# Patient Record
Sex: MALE | Race: WHITE | NOT HISPANIC OR LATINO | Employment: OTHER | URBAN - METROPOLITAN AREA
[De-identification: names, ages, dates, MRNs, and addresses within clinical notes are randomized per-mention and may not be internally consistent; named-entity substitution may affect disease eponyms.]

---

## 2017-03-01 ENCOUNTER — NURSE TRIAGE (OUTPATIENT)
Dept: ADMINISTRATIVE | Facility: CLINIC | Age: 72
End: 2017-03-01

## 2017-03-01 ENCOUNTER — TELEPHONE (OUTPATIENT)
Dept: FAMILY MEDICINE | Facility: CLINIC | Age: 72
End: 2017-03-01

## 2017-03-01 NOTE — TELEPHONE ENCOUNTER
Reason for Disposition   Taking a medicine that could cause dizziness (e.g., blood pressure medications, diuretics)    Protocols used: ST DIZZINESS-A-OH    Leon called to request an appointment due to dizziness.  Transferred to triage per scheduling.  Evan reports dizziness at times when goes from sitting to standing.  Is on BP medication.  States BP has been ok. Evan reports that he thinks he has an inner ear problem and tried to get an ENT appointment but says there is none on the [facility].  As Evan is on blood pressure medication protocol states discuss with PCP and callback by Nurse today.  Please contact Evan to advise at [phone].

## 2017-03-01 NOTE — TELEPHONE ENCOUNTER
----- Message from Marycarmen Jalloh sent at 3/1/2017 11:20 AM CST -----  Contact: pt  Pt is experiencing Dizziness while standing  Call placed to pod, no answer i did reach nurseoncall Colby)  thanks

## 2017-03-07 ENCOUNTER — OFFICE VISIT (OUTPATIENT)
Dept: FAMILY MEDICINE | Facility: CLINIC | Age: 72
End: 2017-03-07
Payer: MEDICARE

## 2017-03-07 VITALS
BODY MASS INDEX: 28.35 KG/M2 | HEIGHT: 69 IN | HEART RATE: 57 BPM | SYSTOLIC BLOOD PRESSURE: 140 MMHG | WEIGHT: 191.38 LBS | DIASTOLIC BLOOD PRESSURE: 80 MMHG | TEMPERATURE: 98 F

## 2017-03-07 DIAGNOSIS — Z00.00 PREVENTATIVE HEALTH CARE: ICD-10-CM

## 2017-03-07 DIAGNOSIS — R42 VERTIGO: Primary | ICD-10-CM

## 2017-03-07 PROCEDURE — 90471 IMMUNIZATION ADMIN: CPT | Mod: S$GLB,,, | Performed by: FAMILY MEDICINE

## 2017-03-07 PROCEDURE — 99999 PR PBB SHADOW E&M-EST. PATIENT-LVL III: CPT | Mod: PBBFAC,,, | Performed by: FAMILY MEDICINE

## 2017-03-07 PROCEDURE — 3077F SYST BP >= 140 MM HG: CPT | Mod: S$GLB,,, | Performed by: FAMILY MEDICINE

## 2017-03-07 PROCEDURE — 99213 OFFICE O/P EST LOW 20 MIN: CPT | Mod: 25,S$GLB,, | Performed by: FAMILY MEDICINE

## 2017-03-07 PROCEDURE — G0009 ADMIN PNEUMOCOCCAL VACCINE: HCPCS | Mod: 59,S$GLB,, | Performed by: FAMILY MEDICINE

## 2017-03-07 PROCEDURE — 1160F RVW MEDS BY RX/DR IN RCRD: CPT | Mod: S$GLB,,, | Performed by: FAMILY MEDICINE

## 2017-03-07 PROCEDURE — 90715 TDAP VACCINE 7 YRS/> IM: CPT | Mod: S$GLB,,, | Performed by: FAMILY MEDICINE

## 2017-03-07 PROCEDURE — 1159F MED LIST DOCD IN RCRD: CPT | Mod: S$GLB,,, | Performed by: FAMILY MEDICINE

## 2017-03-07 PROCEDURE — 1157F ADVNC CARE PLAN IN RCRD: CPT | Mod: S$GLB,,, | Performed by: FAMILY MEDICINE

## 2017-03-07 PROCEDURE — 3079F DIAST BP 80-89 MM HG: CPT | Mod: S$GLB,,, | Performed by: FAMILY MEDICINE

## 2017-03-07 PROCEDURE — 1126F AMNT PAIN NOTED NONE PRSNT: CPT | Mod: S$GLB,,, | Performed by: FAMILY MEDICINE

## 2017-03-07 PROCEDURE — 90732 PPSV23 VACC 2 YRS+ SUBQ/IM: CPT | Mod: S$GLB,,, | Performed by: FAMILY MEDICINE

## 2017-03-07 RX ORDER — CIPROFLOXACIN 500 MG/1
500 TABLET ORAL 2 TIMES DAILY
Refills: 0 | COMMUNITY
Start: 2016-12-09 | End: 2017-03-07 | Stop reason: ALTCHOICE

## 2017-03-07 RX ORDER — TRIAMTERENE AND HYDROCHLOROTHIAZIDE 37.5; 25 MG/1; MG/1
1 CAPSULE ORAL
COMMUNITY
End: 2018-05-28

## 2017-03-07 RX ORDER — HYDROCODONE BITARTRATE AND ACETAMINOPHEN 7.5; 325 MG/1; MG/1
1 TABLET ORAL EVERY 8 HOURS PRN
Refills: 0 | COMMUNITY
Start: 2016-12-09 | End: 2017-05-23

## 2017-03-07 NOTE — PROGRESS NOTES
Subjective:       Patient ID: Evan Garcia is a 71 y.o. male.    Chief Complaint: Dizziness (a week with dizziness,room spinnig,feels like he's falling,if he moves head really quickly he gets dizzy.Hm due pneumo,tetanus)    HPI Comments: C/o 1 week h/o being offbalance .  It comes and goes throughout the day and is worse in certain positions.  Worse in AM.  Tinnitus also seems worse lately.  Some associated nausea        Past Medical History:   Diagnosis Date    Ankylosing spondylitis     thoracic;     BPH (benign prostatic hypertrophy)     Cholelithiases     DDD (degenerative disc disease), lumbar     treated with FILOMENA with success    Fatty liver     HTN (hypertension)     Hypertriglyceridemia     Left kidney mass     followed by urology    Tinnitus of both ears     he consulted with ENT; He uses Xanax PRn    Wears glasses        Past Surgical History:   Procedure Laterality Date    KNEE SURGERY      right; 3 separate surgeries to repair patellar fracture    stapendectomy Bilateral 1985    bilateraly    WISDOM TOOTH EXTRACTION         Review of patient's allergies indicates:   Allergen Reactions    Sulfa (sulfonamide antibiotics) Hives and Other (See Comments)     Decreased white blood count per pt       Social History     Social History    Marital status:      Spouse name: N/A    Number of children: 3    Years of education: N/A     Occupational History    retired refinery      Social History Main Topics    Smoking status: Former Smoker     Types: Cigars, Cigarettes     Start date: 4/24/2014    Smokeless tobacco: Never Used      Comment: will smoke a cigar occasionally    Alcohol use Yes      Comment: occasional    Drug use: No    Sexual activity: Not on file     Other Topics Concern    Not on file     Social History Narrative    Hobbies: fish, hunt        Exercise: [employer] 3 times a week with treadmill               Current Outpatient Prescriptions on File Prior to Visit  "  Medication Sig Dispense Refill    alprazolam (XANAX) 0.5 MG tablet Take 1 tablet (0.5 mg total) by mouth nightly as needed (tinitus). 30 tablet 5    amlodipine (NORVASC) 10 MG tablet Take 1 tablet (10 mg total) by mouth once daily. 90 tablet 3    atenolol (TENORMIN) 100 MG tablet Take 1 tablet (100 mg total) by mouth once daily. 90 tablet 3    finasteride (PROSCAR) 5 mg tablet Take 1 tablet (5 mg total) by mouth once daily. 30 tablet 11     No current facility-administered medications on file prior to visit.        Family History   Problem Relation Age of Onset    Aneurysm Father     Cancer Mother      stomach    Diabetes Mother     Cancer Brother      salivary       Review of Systems   Constitutional: Negative for appetite change, chills, fever and unexpected weight change.   HENT: Negative for sore throat and trouble swallowing.    Eyes: Negative for pain and visual disturbance.   Respiratory: Negative for cough, chest tightness, shortness of breath and wheezing.    Cardiovascular: Negative for chest pain, palpitations and leg swelling.   Gastrointestinal: Negative for abdominal pain, blood in stool, constipation, diarrhea and nausea.   Genitourinary: Negative for difficulty urinating, dysuria and hematuria.   Musculoskeletal: Negative for arthralgias, gait problem and neck pain.   Skin: Negative for rash and wound.   Neurological: Positive for dizziness. Negative for weakness, light-headedness and headaches.   Hematological: Negative for adenopathy.   Psychiatric/Behavioral: Negative for dysphoric mood.       Objective:      BP (!) 140/80  Pulse (!) 57  Temp 98.2 °F (36.8 °C) (Oral)   Ht 5' 9" (1.753 m)  Wt 86.8 kg (191 lb 5.8 oz)  BMI 28.26 kg/m2  Physical Exam   Constitutional: He is oriented to person, place, and time. He appears well-developed and well-nourished. He is active. No distress.   HENT:   Head: Normocephalic and atraumatic.   Right Ear: External ear normal. A foreign body (cerumen " impaction) is present.   Left Ear: External ear normal.   Mouth/Throat: Uvula is midline, oropharynx is clear and moist and mucous membranes are normal. No oropharyngeal exudate.   Eyes: Conjunctivae, EOM and lids are normal. Pupils are equal, round, and reactive to light.   Neck: Normal range of motion, full passive range of motion without pain and phonation normal. Neck supple. No thyroid mass and no thyromegaly present.   Cardiovascular: Normal rate, regular rhythm, normal heart sounds and intact distal pulses.  Exam reveals no gallop and no friction rub.    No murmur heard.  Pulmonary/Chest: Effort normal and breath sounds normal. No respiratory distress. He has no wheezes. He has no rales.   Musculoskeletal: Normal range of motion.   Lymphadenopathy:     He has no cervical adenopathy.   Neurological: He is alert and oriented to person, place, and time. No cranial nerve deficit. Coordination normal.   Vertigo reproduced with left head turning and leaning forward   Skin: Skin is warm and dry.   Psychiatric: He has a normal mood and affect. His speech is normal and behavior is normal. Judgment and thought content normal.       Assessment:       1. Vertigo    2. Preventative health care        Plan:       Vertigo  -     Ambulatory referral to ENT    Preventative health care  -     Pneumococcal Polysaccharide Vaccine (23 Valent) (SQ/IM)  -     Tdap Vaccine      manually removed cerumen from right ear  ENT referral for vertigo and progressive tinnitus  Continue present meds  Counseled on regular exercise, maintenance of a healthy weight, balanced diet rich in fruits/vegetables and lean protein, and avoidance of unhealthy habits like smoking and excessive alcohol intake.

## 2017-05-22 DIAGNOSIS — N40.0 BENIGN NON-NODULAR PROSTATIC HYPERPLASIA WITHOUT LOWER URINARY TRACT SYMPTOMS: ICD-10-CM

## 2017-05-22 RX ORDER — FINASTERIDE 5 MG/1
5 TABLET, FILM COATED ORAL DAILY
Qty: 30 TABLET | Refills: 10 | Status: SHIPPED | OUTPATIENT
Start: 2017-05-22 | End: 2018-05-09 | Stop reason: SDUPTHER

## 2017-05-23 ENCOUNTER — OFFICE VISIT (OUTPATIENT)
Dept: FAMILY MEDICINE | Facility: CLINIC | Age: 72
End: 2017-05-23
Payer: MEDICARE

## 2017-05-23 VITALS
SYSTOLIC BLOOD PRESSURE: 146 MMHG | HEIGHT: 69 IN | DIASTOLIC BLOOD PRESSURE: 60 MMHG | TEMPERATURE: 98 F | WEIGHT: 200.63 LBS | BODY MASS INDEX: 29.71 KG/M2 | HEART RATE: 60 BPM

## 2017-05-23 DIAGNOSIS — H93.13 TINNITUS OF BOTH EARS: ICD-10-CM

## 2017-05-23 DIAGNOSIS — I10 ESSENTIAL HYPERTENSION: Primary | ICD-10-CM

## 2017-05-23 PROCEDURE — 99999 PR PBB SHADOW E&M-EST. PATIENT-LVL III: CPT | Mod: PBBFAC,,, | Performed by: FAMILY MEDICINE

## 2017-05-23 PROCEDURE — 3077F SYST BP >= 140 MM HG: CPT | Mod: S$GLB,,, | Performed by: FAMILY MEDICINE

## 2017-05-23 PROCEDURE — 3078F DIAST BP <80 MM HG: CPT | Mod: S$GLB,,, | Performed by: FAMILY MEDICINE

## 2017-05-23 PROCEDURE — 1125F AMNT PAIN NOTED PAIN PRSNT: CPT | Mod: S$GLB,,, | Performed by: FAMILY MEDICINE

## 2017-05-23 PROCEDURE — 1160F RVW MEDS BY RX/DR IN RCRD: CPT | Mod: S$GLB,,, | Performed by: FAMILY MEDICINE

## 2017-05-23 PROCEDURE — 1157F ADVNC CARE PLAN IN RCRD: CPT | Mod: S$GLB,,, | Performed by: FAMILY MEDICINE

## 2017-05-23 PROCEDURE — 1159F MED LIST DOCD IN RCRD: CPT | Mod: S$GLB,,, | Performed by: FAMILY MEDICINE

## 2017-05-23 PROCEDURE — 99213 OFFICE O/P EST LOW 20 MIN: CPT | Mod: S$GLB,,, | Performed by: FAMILY MEDICINE

## 2017-05-23 RX ORDER — AMLODIPINE BESYLATE 10 MG/1
10 TABLET ORAL DAILY
Qty: 90 TABLET | Refills: 3 | Status: SHIPPED | OUTPATIENT
Start: 2017-05-23 | End: 2018-07-08 | Stop reason: SDUPTHER

## 2017-05-23 RX ORDER — ALPRAZOLAM 0.5 MG/1
0.5 TABLET ORAL NIGHTLY PRN
Qty: 30 TABLET | Refills: 5 | Status: SHIPPED | OUTPATIENT
Start: 2017-05-23 | End: 2017-11-28 | Stop reason: SDUPTHER

## 2017-05-23 RX ORDER — ATENOLOL 100 MG/1
100 TABLET ORAL DAILY
Qty: 90 TABLET | Refills: 3 | Status: SHIPPED | OUTPATIENT
Start: 2017-05-23 | End: 2018-08-07 | Stop reason: SDUPTHER

## 2017-05-23 NOTE — PROGRESS NOTES
Subjective:       Patient ID: Evan Garcia is a 71 y.o. male.    Chief Complaint: Hypertension (6 month follow up)    HPI Comments: Here today for 6 month  f/u on chronic health issues.    HTN - tolerating amlodipine, atenolol. BPs at home within normal range.  BPH - tolerating Proscar daily; urine flow has been good  Tinnitus- using Xanax at bedtime to sleep  Vertigo has resolved  Recent ultrasound with urology showed gallstones and kidney stones. Left renal mass was unchanged.  Cystoscopy confirmed enlarged prostate.    Past Medical History:   Diagnosis Date    Ankylosing spondylitis     thoracic;     BPH (benign prostatic hypertrophy)     Cholelithiases     DDD (degenerative disc disease), lumbar     treated with FILOMENA with success    Fatty liver     HTN (hypertension)     Hypertriglyceridemia     Left kidney mass     followed by urology    Tinnitus of both ears     he consulted with ENT; He uses Xanax PRn    Wears glasses        Past Surgical History:   Procedure Laterality Date    KNEE SURGERY      right; 3 separate surgeries to repair patellar fracture    stapendectomy Bilateral 1985    bilateraly    WISDOM TOOTH EXTRACTION         Allergies   Allergen Reactions    Sulfa (Sulfonamide Antibiotics) Hives and Other (See Comments)     Decreased white blood count per pt       Social History     Social History    Marital status:      Spouse name: N/A    Number of children: 3    Years of education: N/A     Occupational History    retired refinery      Social History Main Topics    Smoking status: Former Smoker     Types: Cigars, Cigarettes     Start date: 4/24/2014    Smokeless tobacco: Never Used      Comment: will smoke a cigar occasionally    Alcohol use Yes      Comment: occasional    Drug use: No    Sexual activity: Not on file     Other Topics Concern    Not on file     Social History Narrative    Hobbies: fish, hunt        Exercise: [employer] 3 times a week with treadmill                Current Outpatient Prescriptions on File Prior to Visit   Medication Sig Dispense Refill    finasteride (PROSCAR) 5 mg tablet TAKE 1 TABLET (5 MG TOTAL) BY MOUTH ONCE DAILY. 30 tablet 10    [DISCONTINUED] alprazolam (XANAX) 0.5 MG tablet Take 1 tablet (0.5 mg total) by mouth nightly as needed (tinitus). 30 tablet 5    [DISCONTINUED] amlodipine (NORVASC) 10 MG tablet Take 1 tablet (10 mg total) by mouth once daily. 90 tablet 3    [DISCONTINUED] atenolol (TENORMIN) 100 MG tablet Take 1 tablet (100 mg total) by mouth once daily. 90 tablet 3    triamterene-hydrochlorothiazide 37.5-25 mg (DYAZIDE) 37.5-25 mg per capsule Take 1 capsule by mouth.      [DISCONTINUED] hydrocodone-acetaminophen 7.5-325mg (NORCO) 7.5-325 mg per tablet Take 1 tablet by mouth every 8 (eight) hours as needed.  0     No current facility-administered medications on file prior to visit.        Family History   Problem Relation Age of Onset    Aneurysm Father     Cancer Mother      stomach    Diabetes Mother     Cancer Brother      salivary     Review of Systems   Constitutional: Negative for fever, appetite change, fatigue and unexpected weight change.   HENT: Negative for ear pain, nosebleeds, congestion, sore throat, rhinorrhea, trouble swallowing, neck pain, postnasal drip, sinus pressure and ear discharge.    Respiratory: Negative for apnea, cough, chest tightness, shortness of breath and wheezing.    Cardiovascular: Negative for chest pain, palpitations and leg swelling.   Gastrointestinal: Negative for nausea, vomiting, abdominal pain, diarrhea, constipation, blood in stool and abdominal distention.   Genitourinary: Negative for dysuria, urgency, frequency, hematuria, flank pain, scrotal swelling, difficulty urinating and testicular pain.   Skin: Negative for color change, rash and wound.   Neurological: Negative for dizziness, tremors, seizures, syncope, speech difficulty, weakness, light-headedness, numbness and headaches.  "  Hematological: Negative for adenopathy. Does not bruise/bleed easily.   Psychiatric/Behavioral: Negative for suicidal ideas, hallucinations, behavioral problems and confusion.       Objective:      BP (!) 146/60 (BP Location: Left arm, Patient Position: Sitting, BP Method: Manual)   Pulse 60   Temp 97.6 °F (36.4 °C) (Oral)   Ht 5' 9" (1.753 m)   Wt 91 kg (200 lb 9.9 oz)   BMI 29.63 kg/m²   Physical Exam   Constitutional: He is oriented to person, place, and time. He appears well-developed and well-nourished. He is active and cooperative.   Nose: Nose normal.   Eyelid hypertrophy bilaterally  Mouth/Throat: Oropharynx is clear and moist. No oropharyngeal exudate.   Eyes: Conjunctivae and EOM are normal. Pupils are equal, round, and reactive to light. Right eye exhibits no discharge. Left eye exhibits no discharge. No scleral icterus.   Neck: Trachea normal, normal range of motion and full passive range of motion without pain. Neck supple. Normal carotid pulses and no JVD present. Carotid bruit is not present. No tracheal deviation present. No mass and no thyromegaly present.   Cardiovascular: Normal rate, regular rhythm, S1 normal, S2 normal, normal heart sounds and intact distal pulses.  Exam reveals no gallop and no friction rub.  No murmur heard.  Pulses:       Carotid pulses are 2+ on the right side, and 2+ on the left side.       Radial pulses are 2+ on the right side, and 2+ on the left side.        Dorsalis pedis pulses are 2+ on the right side, and 2+ on the left side.   Pulmonary/Chest: Effort normal and breath sounds normal. No respiratory distress. He has no wheezes. He has no rales.   Lymphadenopathy:        Head (right side): No tonsillar adenopathy present.        Head (left side): No tonsillar adenopathy present.     He has no cervical adenopathy.   Neurological: He is alert and oriented to person, place, and time. He has normal strength. No cranial nerve deficit. Coordination normal. "   Psychiatric: He has a normal mood and affect. His speech is normal and behavior is normal. Judgment and thought content normal.         Results for orders placed or performed in visit on 04/20/17   Prostate Specific Antigen, Diagnostic   Result Value Ref Range    PSA DIAGNOSTIC 1.2 0.00 - 4.00 ng/mL     Assessment:       1. Essential hypertension    2. Tinnitus of both ears        Plan:       Essential hypertension  -     atenolol (TENORMIN) 100 MG tablet; Take 1 tablet (100 mg total) by mouth once daily.  Dispense: 90 tablet; Refill: 3  -     amlodipine (NORVASC) 10 MG tablet; Take 1 tablet (10 mg total) by mouth once daily.  Dispense: 90 tablet; Refill: 3    Tinnitus of both ears  -     alprazolam (XANAX) 0.5 MG tablet; Take 1 tablet (0.5 mg total) by mouth nightly as needed (tinitus).  Dispense: 30 tablet; Refill: 5       Continue present meds  Counseled on regular exercise, maintenance of a healthy weight, balanced diet rich in fruits/vegetables and lean protein, and avoidance of unhealthy habits like smoking and excessive alcohol intake.  Continue low fat diet    F/u 6 months to refill xanax or PRN

## 2017-10-19 ENCOUNTER — TELEPHONE (OUTPATIENT)
Dept: FAMILY MEDICINE | Facility: CLINIC | Age: 72
End: 2017-10-19

## 2017-10-19 DIAGNOSIS — Z00.00 PREVENTATIVE HEALTH CARE: Primary | ICD-10-CM

## 2017-10-19 NOTE — TELEPHONE ENCOUNTER
----- Message from Sofya James sent at 10/19/2017 12:00 PM CDT -----   Patient would like to do blood work before appointment please contact.

## 2017-10-20 ENCOUNTER — TELEPHONE (OUTPATIENT)
Dept: FAMILY MEDICINE | Facility: CLINIC | Age: 72
End: 2017-10-20

## 2017-10-20 NOTE — TELEPHONE ENCOUNTER
----- Message from Karime Fitzgerald sent at 10/19/2017  4:43 PM CDT -----  Contact: self   Placed call to pod, patient miss call from your office please call back at [phone] (home)

## 2017-10-23 ENCOUNTER — LAB VISIT (OUTPATIENT)
Dept: LAB | Facility: HOSPITAL | Age: 72
End: 2017-10-23
Attending: FAMILY MEDICINE
Payer: MEDICARE

## 2017-10-23 DIAGNOSIS — Z00.00 PREVENTATIVE HEALTH CARE: ICD-10-CM

## 2017-10-23 LAB
ALBUMIN SERPL BCP-MCNC: 4 G/DL
ALP SERPL-CCNC: 74 U/L
ALT SERPL W/O P-5'-P-CCNC: 55 U/L
ANION GAP SERPL CALC-SCNC: 7 MMOL/L
AST SERPL-CCNC: 33 U/L
BILIRUB SERPL-MCNC: 0.7 MG/DL
BUN SERPL-MCNC: 15 MG/DL
CALCIUM SERPL-MCNC: 9.6 MG/DL
CHLORIDE SERPL-SCNC: 107 MMOL/L
CHOLEST SERPL-MCNC: 179 MG/DL
CHOLEST/HDLC SERPL: 5.8 {RATIO}
CO2 SERPL-SCNC: 27 MMOL/L
COMPLEXED PSA SERPL-MCNC: 1.8 NG/ML
CREAT SERPL-MCNC: 1.1 MG/DL
EST. GFR  (AFRICAN AMERICAN): >60 ML/MIN/1.73 M^2
EST. GFR  (NON AFRICAN AMERICAN): >60 ML/MIN/1.73 M^2
GLUCOSE SERPL-MCNC: 103 MG/DL
HDLC SERPL-MCNC: 31 MG/DL
HDLC SERPL: 17.3 %
LDLC SERPL CALC-MCNC: 89 MG/DL
NONHDLC SERPL-MCNC: 148 MG/DL
POTASSIUM SERPL-SCNC: 4.2 MMOL/L
PROT SERPL-MCNC: 7.9 G/DL
SODIUM SERPL-SCNC: 141 MMOL/L
TRIGL SERPL-MCNC: 295 MG/DL

## 2017-10-23 PROCEDURE — 36415 COLL VENOUS BLD VENIPUNCTURE: CPT | Mod: PO

## 2017-10-23 PROCEDURE — 80053 COMPREHEN METABOLIC PANEL: CPT

## 2017-10-23 PROCEDURE — 83036 HEMOGLOBIN GLYCOSYLATED A1C: CPT

## 2017-10-23 PROCEDURE — 84153 ASSAY OF PSA TOTAL: CPT

## 2017-10-23 PROCEDURE — 80061 LIPID PANEL: CPT

## 2017-10-24 LAB
ESTIMATED AVG GLUCOSE: 108 MG/DL
HBA1C MFR BLD HPLC: 5.4 %

## 2017-11-28 ENCOUNTER — OFFICE VISIT (OUTPATIENT)
Dept: FAMILY MEDICINE | Facility: CLINIC | Age: 72
End: 2017-11-28
Payer: MEDICARE

## 2017-11-28 VITALS
WEIGHT: 200.19 LBS | HEIGHT: 69 IN | SYSTOLIC BLOOD PRESSURE: 122 MMHG | DIASTOLIC BLOOD PRESSURE: 84 MMHG | HEART RATE: 55 BPM | BODY MASS INDEX: 29.65 KG/M2 | OXYGEN SATURATION: 95 %

## 2017-11-28 DIAGNOSIS — I10 ESSENTIAL HYPERTENSION: Primary | ICD-10-CM

## 2017-11-28 DIAGNOSIS — H93.13 TINNITUS OF BOTH EARS: ICD-10-CM

## 2017-11-28 PROCEDURE — 99499 UNLISTED E&M SERVICE: CPT | Mod: S$GLB,,, | Performed by: FAMILY MEDICINE

## 2017-11-28 PROCEDURE — 99999 PR PBB SHADOW E&M-EST. PATIENT-LVL III: CPT | Mod: PBBFAC,,, | Performed by: FAMILY MEDICINE

## 2017-11-28 PROCEDURE — 99213 OFFICE O/P EST LOW 20 MIN: CPT | Mod: S$GLB,,, | Performed by: FAMILY MEDICINE

## 2017-11-28 PROCEDURE — 90662 IIV NO PRSV INCREASED AG IM: CPT | Mod: S$GLB,,, | Performed by: FAMILY MEDICINE

## 2017-11-28 PROCEDURE — G0008 ADMIN INFLUENZA VIRUS VAC: HCPCS | Mod: S$GLB,,, | Performed by: FAMILY MEDICINE

## 2017-11-28 RX ORDER — ALPRAZOLAM 0.5 MG/1
0.5 TABLET ORAL NIGHTLY PRN
Qty: 30 TABLET | Refills: 5 | Status: SHIPPED | OUTPATIENT
Start: 2017-11-28 | End: 2018-05-28 | Stop reason: SDUPTHER

## 2017-11-28 NOTE — PROGRESS NOTES
Subjective:       Patient ID: Evan Garcia is a 72 y.o. male.    Chief Complaint: Hypertension (6 mos f/u) and DDD    HPI Comments: Here today for 6 month  f/u on chronic health issues.    HTN - tolerating amlodipine, atenolol. BPs at home within normal range.  BPH - tolerating Proscar daily; urine flow has been good  Tinnitus- using Xanax at bedtime to sleep  Vertigo has resolved  Wearing left wrist brace due to recent wrist sprain. He was seen in the ER and xrays were negative.      Past Medical History:   Diagnosis Date    Ankylosing spondylitis     thoracic;     BPH (benign prostatic hypertrophy)     Cholelithiases     DDD (degenerative disc disease), lumbar     treated with FILOMENA with success    Fatty liver     HTN (hypertension)     Hypertriglyceridemia     Left kidney mass     followed by urology    Tinnitus of both ears     he consulted with ENT; He uses Xanax PRn    Wears glasses        Past Surgical History:   Procedure Laterality Date    KNEE SURGERY      right; 3 separate surgeries to repair patellar fracture    stapendectomy Bilateral 1985    bilateraly    WISDOM TOOTH EXTRACTION         Allergies   Allergen Reactions    Sulfa (Sulfonamide Antibiotics) Hives and Other (See Comments)     Decreased white blood count per pt       Social History     Social History    Marital status:      Spouse name: N/A    Number of children: 3    Years of education: N/A     Occupational History    retired refinery      Social History Main Topics    Smoking status: Former Smoker     Types: Cigars, Cigarettes     Start date: 4/24/2014    Smokeless tobacco: Never Used      Comment: will smoke a cigar occasionally    Alcohol use Yes      Comment: occasional    Drug use: No    Sexual activity: Not on file     Other Topics Concern    Not on file     Social History Narrative    Hobbies: fish, hunt        Exercise: [employer] 3 times a week with treadmill               Current Outpatient  Prescriptions on File Prior to Visit   Medication Sig Dispense Refill    amlodipine (NORVASC) 10 MG tablet Take 1 tablet (10 mg total) by mouth once daily. 90 tablet 3    atenolol (TENORMIN) 100 MG tablet Take 1 tablet (100 mg total) by mouth once daily. 90 tablet 3    finasteride (PROSCAR) 5 mg tablet TAKE 1 TABLET (5 MG TOTAL) BY MOUTH ONCE DAILY. 30 tablet 10    triamterene-hydrochlorothiazide 37.5-25 mg (DYAZIDE) 37.5-25 mg per capsule Take 1 capsule by mouth.      [DISCONTINUED] alprazolam (XANAX) 0.5 MG tablet Take 1 tablet (0.5 mg total) by mouth nightly as needed (tinitus). 30 tablet 5    naproxen (NAPROSYN) 500 MG tablet Take 1 tablet (500 mg total) by mouth 2 (two) times daily with meals. 20 tablet 0     No current facility-administered medications on file prior to visit.        Family History   Problem Relation Age of Onset    Aneurysm Father     Cancer Mother      stomach    Diabetes Mother     Cancer Brother      salivary     Review of Systems   Constitutional: Negative for fever, appetite change, fatigue and unexpected weight change.   HENT: Negative for ear pain, nosebleeds, congestion, sore throat, rhinorrhea, trouble swallowing, neck pain, postnasal drip, sinus pressure and ear discharge.    Respiratory: Negative for apnea, cough, chest tightness, shortness of breath and wheezing.    Cardiovascular: Negative for chest pain, palpitations and leg swelling.   Gastrointestinal: Negative for nausea, vomiting, abdominal pain, diarrhea, constipation, blood in stool and abdominal distention.   Genitourinary: Negative for dysuria, urgency, frequency, hematuria, flank pain, scrotal swelling, difficulty urinating and testicular pain.   Skin: Negative for color change, rash and wound.   Neurological: Negative for dizziness, tremors, seizures, syncope, speech difficulty, weakness, light-headedness, numbness and headaches.   Hematological: Negative for adenopathy. Does not bruise/bleed easily.  "  Psychiatric/Behavioral: Negative for suicidal ideas, hallucinations, behavioral problems and confusion.       Objective:      /84   Pulse (!) 55   Ht 5' 9" (1.753 m)   Wt 90.8 kg (200 lb 2.8 oz)   SpO2 95%   BMI 29.56 kg/m²   Physical Exam   Constitutional: He is oriented to person, place, and time. He appears well-developed and well-nourished. He is active and cooperative.   Nose: Nose normal.   Eyelid hypertrophy bilaterally  Mouth/Throat: Oropharynx is clear and moist. No oropharyngeal exudate.   Eyes: Conjunctivae and EOM are normal. Pupils are equal, round, and reactive to light. Right eye exhibits no discharge. Left eye exhibits no discharge. No scleral icterus.   Neck: Trachea normal, normal range of motion and full passive range of motion without pain. Neck supple. Normal carotid pulses and no JVD present. Carotid bruit is not present. No tracheal deviation present. No mass and no thyromegaly present.   Cardiovascular: Normal rate, regular rhythm, S1 normal, S2 normal, normal heart sounds and intact distal pulses.  Exam reveals no gallop and no friction rub.  No murmur heard.  Pulses:       Carotid pulses are 2+ on the right side, and 2+ on the left side.       Radial pulses are 2+ on the right side, and 2+ on the left side.        Dorsalis pedis pulses are 2+ on the right side, and 2+ on the left side.   Pulmonary/Chest: Effort normal and breath sounds normal. No respiratory distress. He has no wheezes. He has no rales.   Lymphadenopathy:        Head (right side): No tonsillar adenopathy present.        Head (left side): No tonsillar adenopathy present.     He has no cervical adenopathy.   Neurological: He is alert and oriented to person, place, and time. He has normal strength. No cranial nerve deficit. Coordination normal.   Psychiatric: He has a normal mood and affect. His speech is normal and behavior is normal. Judgment and thought content normal.         Results for orders placed or " performed in visit on 10/23/17   Lipid panel   Result Value Ref Range    Cholesterol 179 120 - 199 mg/dL    Triglycerides 295 (H) 30 - 150 mg/dL    HDL 31 (L) 40 - 75 mg/dL    LDL Cholesterol 89.0 63.0 - 159.0 mg/dL    HDL/Chol Ratio 17.3 (L) 20.0 - 50.0 %    Total Cholesterol/HDL Ratio 5.8 (H) 2.0 - 5.0    Non-HDL Cholesterol 148 mg/dL   Comprehensive metabolic panel   Result Value Ref Range    Sodium 141 136 - 145 mmol/L    Potassium 4.2 3.5 - 5.1 mmol/L    Chloride 107 95 - 110 mmol/L    CO2 27 23 - 29 mmol/L    Glucose 103 70 - 110 mg/dL    BUN, Bld 15 8 - 23 mg/dL    Creatinine 1.1 0.5 - 1.4 mg/dL    Calcium 9.6 8.7 - 10.5 mg/dL    Total Protein 7.9 6.0 - 8.4 g/dL    Albumin 4.0 3.5 - 5.2 g/dL    Total Bilirubin 0.7 0.1 - 1.0 mg/dL    Alkaline Phosphatase 74 55 - 135 U/L    AST 33 10 - 40 U/L    ALT 55 (H) 10 - 44 U/L    Anion Gap 7 (L) 8 - 16 mmol/L    eGFR if African American >60.0 >60 mL/min/1.73 m^2    eGFR if non African American >60.0 >60 mL/min/1.73 m^2   Hemoglobin A1c   Result Value Ref Range    Hemoglobin A1C 5.4 4.0 - 5.6 %    Estimated Avg Glucose 108 68 - 131 mg/dL   PSA, Screening   Result Value Ref Range    PSA, SCREEN 1.8 0.00 - 4.00 ng/mL     Assessment:       1. Essential hypertension    2. Tinnitus of both ears        Plan:       Essential hypertension    Tinnitus of both ears  -     ALPRAZolam (XANAX) 0.5 MG tablet; Take 1 tablet (0.5 mg total) by mouth nightly as needed (tinitus).  Dispense: 30 tablet; Refill: 5       Continue present meds  Counseled on regular exercise, maintenance of a healthy weight, balanced diet rich in fruits/vegetables and lean protein, and avoidance of unhealthy habits like smoking and excessive alcohol intake.  Continue low fat diet  Continue wrist brace when active for at least 2-3 weeks; some stretching  F/u 6 months to refill xanax or PRN

## 2018-05-09 DIAGNOSIS — N40.0 BENIGN NON-NODULAR PROSTATIC HYPERPLASIA WITHOUT LOWER URINARY TRACT SYMPTOMS: ICD-10-CM

## 2018-05-09 RX ORDER — FINASTERIDE 5 MG/1
5 TABLET, FILM COATED ORAL DAILY
Qty: 30 TABLET | Refills: 10 | Status: SHIPPED | OUTPATIENT
Start: 2018-05-09 | End: 2019-03-18 | Stop reason: SDUPTHER

## 2018-05-28 ENCOUNTER — OFFICE VISIT (OUTPATIENT)
Dept: FAMILY MEDICINE | Facility: CLINIC | Age: 73
End: 2018-05-28
Payer: MEDICARE

## 2018-05-28 VITALS
DIASTOLIC BLOOD PRESSURE: 82 MMHG | HEIGHT: 69 IN | OXYGEN SATURATION: 95 % | WEIGHT: 194.25 LBS | RESPIRATION RATE: 18 BRPM | SYSTOLIC BLOOD PRESSURE: 138 MMHG | HEART RATE: 56 BPM | BODY MASS INDEX: 28.77 KG/M2

## 2018-05-28 DIAGNOSIS — D48.9 NEOPLASM OF UNCERTAIN BEHAVIOR: Primary | ICD-10-CM

## 2018-05-28 DIAGNOSIS — Z12.5 PROSTATE CANCER SCREENING: ICD-10-CM

## 2018-05-28 DIAGNOSIS — H93.13 TINNITUS OF BOTH EARS: ICD-10-CM

## 2018-05-28 DIAGNOSIS — E78.1 HYPERTRIGLYCERIDEMIA: ICD-10-CM

## 2018-05-28 DIAGNOSIS — Z00.00 PREVENTATIVE HEALTH CARE: ICD-10-CM

## 2018-05-28 PROCEDURE — 3079F DIAST BP 80-89 MM HG: CPT | Mod: CPTII,S$GLB,, | Performed by: FAMILY MEDICINE

## 2018-05-28 PROCEDURE — 99999 PR PBB SHADOW E&M-EST. PATIENT-LVL III: CPT | Mod: PBBFAC,,, | Performed by: FAMILY MEDICINE

## 2018-05-28 PROCEDURE — 3075F SYST BP GE 130 - 139MM HG: CPT | Mod: CPTII,S$GLB,, | Performed by: FAMILY MEDICINE

## 2018-05-28 PROCEDURE — 99214 OFFICE O/P EST MOD 30 MIN: CPT | Mod: S$GLB,,, | Performed by: FAMILY MEDICINE

## 2018-05-28 RX ORDER — ALPRAZOLAM 0.5 MG/1
0.5 TABLET ORAL NIGHTLY PRN
Qty: 30 TABLET | Refills: 5 | Status: SHIPPED | OUTPATIENT
Start: 2018-05-28 | End: 2018-11-28 | Stop reason: SDUPTHER

## 2018-05-28 NOTE — PROGRESS NOTES
Subjective:       Patient ID: Evan Garcia is a 72 y.o. male.    Chief Complaint: Follow-up    HPI Comments: Here today for 6 month  f/u on chronic health issues.    HTN - tolerating amlodipine, atenolol. BPs at home within normal range.  BPH - tolerating Proscar daily; urine flow has been good  Tinnitus- using Xanax at bedtime to sleep  Vertigo still happens ossasionally      Past Medical History:   Diagnosis Date    Ankylosing spondylitis     thoracic;     BPH (benign prostatic hypertrophy)     Cholelithiases     DDD (degenerative disc disease), lumbar     treated with FILOMENA with success    Fatty liver     HTN (hypertension)     Hypertriglyceridemia     Left kidney mass     followed by urology    Tinnitus of both ears     he consulted with ENT; He uses Xanax PRn    Wears glasses        Past Surgical History:   Procedure Laterality Date    KNEE SURGERY      right; 3 separate surgeries to repair patellar fracture    stapendectomy Bilateral 1985    bilateraly    WISDOM TOOTH EXTRACTION         Allergies   Allergen Reactions    Sulfa (Sulfonamide Antibiotics) Hives and Other (See Comments)     Decreased white blood count per pt       Social History     Social History    Marital status:      Spouse name: N/A    Number of children: 3    Years of education: N/A     Occupational History    retired refinery      Social History Main Topics    Smoking status: Former Smoker     Types: Cigars, Cigarettes     Start date: 4/24/2014    Smokeless tobacco: Never Used      Comment: will smoke a cigar occasionally    Alcohol use Yes      Comment: occasional    Drug use: No    Sexual activity: Not on file     Other Topics Concern    Not on file     Social History Narrative    Hobbies: fish, hunt        Exercise: [employer] 3 times a week with treadmill               Current Outpatient Prescriptions on File Prior to Visit   Medication Sig Dispense Refill    amlodipine (NORVASC) 10 MG tablet Take 1 tablet  (10 mg total) by mouth once daily. 90 tablet 3    atenolol (TENORMIN) 100 MG tablet Take 1 tablet (100 mg total) by mouth once daily. 90 tablet 3    finasteride (PROSCAR) 5 mg tablet TAKE 1 TABLET (5 MG TOTAL) BY MOUTH ONCE DAILY. 30 tablet 10    naproxen (NAPROSYN) 500 MG tablet Take 1 tablet (500 mg total) by mouth 2 (two) times daily with meals. 20 tablet 0    [DISCONTINUED] ALPRAZolam (XANAX) 0.5 MG tablet Take 1 tablet (0.5 mg total) by mouth nightly as needed (tinitus). 30 tablet 5    [DISCONTINUED] triamterene-hydrochlorothiazide 37.5-25 mg (DYAZIDE) 37.5-25 mg per capsule Take 1 capsule by mouth.       No current facility-administered medications on file prior to visit.        Family History   Problem Relation Age of Onset    Aneurysm Father     Cancer Mother         stomach    Diabetes Mother     Cancer Brother         salivary     Review of Systems   Constitutional: Negative for fever, appetite change, fatigue and unexpected weight change.   HENT: Negative for ear pain, nosebleeds, congestion, sore throat, rhinorrhea, trouble swallowing, neck pain, postnasal drip, sinus pressure and ear discharge.    Respiratory: Negative for apnea, cough, chest tightness, shortness of breath and wheezing.    Cardiovascular: Negative for chest pain, palpitations and leg swelling.   Gastrointestinal: Negative for nausea, vomiting, abdominal pain, diarrhea, constipation, blood in stool and abdominal distention.   Genitourinary: Negative for dysuria, urgency, frequency, hematuria, flank pain, scrotal swelling, difficulty urinating and testicular pain.   Skin: Negative for color change, rash and wound.   Neurological: Negative for dizziness, tremors, seizures, syncope, speech difficulty, weakness, light-headedness, numbness and headaches.   Hematological: Negative for adenopathy. Does not bruise/bleed easily.   Psychiatric/Behavioral: Negative for suicidal ideas, hallucinations, behavioral problems and confusion.      "  Objective:      /82   Pulse (!) 56   Resp 18   Ht 5' 9" (1.753 m)   Wt 88.1 kg (194 lb 3.6 oz)   SpO2 95%   BMI 28.68 kg/m²   Physical Exam   Constitutional: He is oriented to person, place, and time. He appears well-developed and well-nourished. He is active and cooperative.   Nose: Nose normal.   Eyelid hypertrophy bilaterally  Mouth/Throat: Oropharynx is clear and moist. No oropharyngeal exudate.   Eyes: Conjunctivae and EOM are normal. Pupils are equal, round, and reactive to light. Right eye exhibits no discharge. Left eye exhibits no discharge. No scleral icterus.   Neck: Trachea normal, normal range of motion and full passive range of motion without pain. Neck supple. Normal carotid pulses and no JVD present. Carotid bruit is not present. No tracheal deviation present. No mass and no thyromegaly present.   Cardiovascular: Normal rate, regular rhythm, S1 normal, S2 normal, normal heart sounds and intact distal pulses.  Exam reveals no gallop and no friction rub.  No murmur heard.  Pulses:       Carotid pulses are 2+ on the right side, and 2+ on the left side.       Radial pulses are 2+ on the right side, and 2+ on the left side.        Dorsalis pedis pulses are 2+ on the right side, and 2+ on the left side.   Pulmonary/Chest: Effort normal and breath sounds normal. No respiratory distress. He has no wheezes. He has no rales.   Lymphadenopathy:        Head (right side): No tonsillar adenopathy present.        Head (left side): No tonsillar adenopathy present.     He has no cervical adenopathy.   Neurological: He is alert and oriented to person, place, and time. He has normal strength. No cranial nerve deficit. Coordination normal.   Psychiatric: He has a normal mood and affect. His speech is normal and behavior is normal. Judgment and thought content normal.         Results for orders placed or performed in visit on 10/23/17   Lipid panel   Result Value Ref Range    Cholesterol 179 120 - 199 mg/dL "    Triglycerides 295 (H) 30 - 150 mg/dL    HDL 31 (L) 40 - 75 mg/dL    LDL Cholesterol 89.0 63.0 - 159.0 mg/dL    HDL/Chol Ratio 17.3 (L) 20.0 - 50.0 %    Total Cholesterol/HDL Ratio 5.8 (H) 2.0 - 5.0    Non-HDL Cholesterol 148 mg/dL   Comprehensive metabolic panel   Result Value Ref Range    Sodium 141 136 - 145 mmol/L    Potassium 4.2 3.5 - 5.1 mmol/L    Chloride 107 95 - 110 mmol/L    CO2 27 23 - 29 mmol/L    Glucose 103 70 - 110 mg/dL    BUN, Bld 15 8 - 23 mg/dL    Creatinine 1.1 0.5 - 1.4 mg/dL    Calcium 9.6 8.7 - 10.5 mg/dL    Total Protein 7.9 6.0 - 8.4 g/dL    Albumin 4.0 3.5 - 5.2 g/dL    Total Bilirubin 0.7 0.1 - 1.0 mg/dL    Alkaline Phosphatase 74 55 - 135 U/L    AST 33 10 - 40 U/L    ALT 55 (H) 10 - 44 U/L    Anion Gap 7 (L) 8 - 16 mmol/L    eGFR if African American >60.0 >60 mL/min/1.73 m^2    eGFR if non African American >60.0 >60 mL/min/1.73 m^2   Hemoglobin A1c   Result Value Ref Range    Hemoglobin A1C 5.4 4.0 - 5.6 %    Estimated Avg Glucose 108 68 - 131 mg/dL   PSA, Screening   Result Value Ref Range    PSA, SCREEN 1.8 0.00 - 4.00 ng/mL     Assessment:       1. Neoplasm of uncertain behavior    2. Tinnitus of both ears    3. Preventative health care    4. Prostate cancer screening    5. Hypertriglyceridemia        Plan:       Neoplasm of uncertain behavior  -     Ambulatory referral to Dermatology    Tinnitus of both ears  -     ALPRAZolam (XANAX) 0.5 MG tablet; Take 1 tablet (0.5 mg total) by mouth nightly as needed (tinitus).  Dispense: 30 tablet; Refill: 5    Preventative health care    Prostate cancer screening  -     PSA, Screening; Future; Expected date: 11/24/2018    Hypertriglyceridemia  -     Comprehensive metabolic panel; Future; Expected date: 11/24/2018  -     Lipid panel; Future; Expected date: 11/24/2018       Continue present meds  Counseled on regular exercise, maintenance of a healthy weight, balanced diet rich in fruits/vegetables and lean protein, and avoidance of unhealthy  habits like smoking and excessive alcohol intake.  Continue low fat diet  F/u 6 months to refill xanax or PRN

## 2018-07-08 DIAGNOSIS — I10 ESSENTIAL HYPERTENSION: ICD-10-CM

## 2018-07-08 RX ORDER — AMLODIPINE BESYLATE 10 MG/1
10 TABLET ORAL DAILY
Qty: 90 TABLET | Refills: 2 | Status: SHIPPED | OUTPATIENT
Start: 2018-07-08 | End: 2019-04-01 | Stop reason: SDUPTHER

## 2018-08-07 DIAGNOSIS — I10 ESSENTIAL HYPERTENSION: ICD-10-CM

## 2018-08-07 RX ORDER — ATENOLOL 100 MG/1
100 TABLET ORAL DAILY
Qty: 90 TABLET | Refills: 3 | Status: SHIPPED | OUTPATIENT
Start: 2018-08-07 | End: 2019-08-05 | Stop reason: SDUPTHER

## 2018-08-21 ENCOUNTER — INITIAL CONSULT (OUTPATIENT)
Dept: DERMATOLOGY | Facility: CLINIC | Age: 73
End: 2018-08-21
Payer: MEDICARE

## 2018-08-21 VITALS — HEIGHT: 69 IN | WEIGHT: 194 LBS | BODY MASS INDEX: 28.73 KG/M2

## 2018-08-21 DIAGNOSIS — D48.5 NEOPLASM OF UNCERTAIN BEHAVIOR OF SKIN: ICD-10-CM

## 2018-08-21 DIAGNOSIS — L82.1 SEBORRHEIC KERATOSES: ICD-10-CM

## 2018-08-21 DIAGNOSIS — L82.0 SEBORRHEIC KERATOSES, INFLAMED: Primary | ICD-10-CM

## 2018-08-21 PROCEDURE — 11100 PR BIOPSY OF SKIN LESION: CPT | Mod: 51,59,S$GLB, | Performed by: DERMATOLOGY

## 2018-08-21 PROCEDURE — 88305 TISSUE EXAM BY PATHOLOGIST: CPT | Mod: 26,,, | Performed by: PATHOLOGY

## 2018-08-21 PROCEDURE — 99213 OFFICE O/P EST LOW 20 MIN: CPT | Mod: 25,S$GLB,, | Performed by: DERMATOLOGY

## 2018-08-21 PROCEDURE — 17111 DESTRUCTION B9 LESIONS 15/>: CPT | Mod: S$GLB,,, | Performed by: DERMATOLOGY

## 2018-08-21 PROCEDURE — 88305 TISSUE EXAM BY PATHOLOGIST: CPT | Mod: 59 | Performed by: PATHOLOGY

## 2018-08-21 PROCEDURE — 99999 PR PBB SHADOW E&M-EST. PATIENT-LVL III: CPT | Mod: PBBFAC,,, | Performed by: DERMATOLOGY

## 2018-08-21 PROCEDURE — 11101 PR BIOPSY, EACH ADDED LESION: CPT | Mod: S$GLB,,, | Performed by: DERMATOLOGY

## 2018-08-21 NOTE — PROGRESS NOTES
Subjective:       Patient ID:  Evan Garcia is a 72 y.o. male who presents for   Chief Complaint   Patient presents with    Skin Check    Lesion     Pt last seen on 9/28/2015 here today for skin check c/o a new lesion on his left nose present for a few months, gets irritated by his eyeglasses, not treated  He has become bothered by several brown growths on his chest and back,itchy, not treated    No phx of skin cancer. His mother has a hx of NMSC    Past Medical History:  No date: Ankylosing spondylitis      Comment:  thoracic;   No date: BPH (benign prostatic hypertrophy)  No date: Cholelithiases  No date: DDD (degenerative disc disease), lumbar      Comment:  treated with FILOMENA with success  No date: Fatty liver  No date: HTN (hypertension)  No date: Hypertriglyceridemia  No date: Left kidney mass      Comment:  followed by urology  No date: Tinnitus of both ears      Comment:  he consulted with ENT; He uses Xanax PRn  No date: Wears glasses                  Review of Systems   Skin: Negative for itching, rash, dry skin, daily sunscreen use, activity-related sunscreen use, sensitivity to antibiotic ointment, sensitivity to bandage adhesive and tendency to form keloidal scars.   Hematologic/Lymphatic: Does not bruise/bleed easily.        Objective:    Physical Exam   Constitutional: He appears well-developed and well-nourished. No distress.   HENT:   Mouth/Throat: Lips normal.    Eyes: Lids are normal.  No conjunctival no injection.   Neurological: He is alert and oriented to person, place, and time. He is not disoriented.   Psychiatric: He has a normal mood and affect.   Skin:   Areas Examined (abnormalities noted in diagram):   Scalp / Hair Palpated and Inspected  Head / Face Inspection Performed  Neck Inspection Performed  Chest / Axilla Inspection Performed  Back Inspection Performed  RUE Inspected  LUE Inspection Performed                   Diagram Legend     Erythematous scaling macule/papule c/w actinic  keratosis       Vascular papule c/w angioma      Pigmented verrucoid papule/plaque c/w seborrheic keratosis      Yellow umbilicated papule c/w sebaceous hyperplasia      Irregularly shaped tan macule c/w lentigo     1-2 mm smooth white papules consistent with Milia      Movable subcutaneous cyst with punctum c/w epidermal inclusion cyst      Subcutaneous movable cyst c/w pilar cyst      Firm pink to brown papule c/w dermatofibroma      Pedunculated fleshy papule(s) c/w skin tag(s)      Evenly pigmented macule c/w junctional nevus     Mildly variegated pigmented, slightly irregular-bordered macule c/w mildly atypical nevus      Flesh colored to evenly pigmented papule c/w intradermal nevus       Pink pearly papule/plaque c/w basal cell carcinoma      Erythematous hyperkeratotic cursted plaque c/w SCC      Surgical scar with no sign of skin cancer recurrence      Open and closed comedones      Inflammatory papules and pustules      Verrucoid papule consistent consistent with wart     Erythematous eczematous patches and plaques     Dystrophic onycholytic nail with subungual debris c/w onychomycosis     Umbilicated papule    Erythematous-base heme-crusted tan verrucoid plaque consistent with inflamed seborrheic keratosis     Erythematous Silvery Scaling Plaque c/w Psoriasis     See annotation      Assessment / Plan:      Pathology Orders:     Normal Orders This Visit    Tissue Specimen To Pathology, Dermatology     Questions:    Directional Terms:  Other(comment)    Clinical information:  sk vs other    Specific Site:  right posterior scalp    Tissue Specimen To Pathology, Dermatology     Questions:    Directional Terms:  Other(comment)    Clinical information:  sk vs other    Specific Site:  left forearm    Tissue Specimen To Pathology, Dermatology     Questions:    Directional Terms:  Other(comment)    Clinical information:  sk vs other    Specific Site:  left nasal sidewall        Seborrheic keratoses,  inflamed  Cryosurgery procedure note:    Verbal consent from the patient is obtained. Liquid nitrogen cryosurgery is applied to 18 lesions to produce a freeze injury. The patient is aware that blisters may form and is instructed on wound care with gentle cleansing and use of vaseline ointment to keep moist until healed. The patient is supplied a handout on cryosurgery and is instructed to call if lesions do not completely resolve. Risk of dyspigmentation discussed.       Seborrheic keratoses, trunk and extremities  These are benign inherited growths without a malignant potential. Reassurance given to patient. No treatment is necessary.         Neoplasm of uncertain behavior of skin  -     Tissue Specimen To Pathology, Dermatology  -     Tissue Specimen To Pathology, Dermatology  -     Tissue Specimen To Pathology, Dermatology    Shave biopsy procedure note:    Shave biopsy performed after verbal consent including risk of infection, scar, recurrence, need for additional treatment of site. Area prepped with alcohol, anesthetized with approximately 1.0cc of 1% lidocaine with epinephrine. Lesional tissue shaved with razor blade. Hemostasis achieved with application of aluminum chloride followed by hyfrecation. No complications. Dressing applied. Wound care explained.               Follow-up in about 6 weeks (around 10/2/2018).

## 2018-10-09 ENCOUNTER — OFFICE VISIT (OUTPATIENT)
Dept: DERMATOLOGY | Facility: CLINIC | Age: 73
End: 2018-10-09
Payer: MEDICARE

## 2018-10-09 DIAGNOSIS — L82.1 SEBORRHEIC KERATOSES: ICD-10-CM

## 2018-10-09 DIAGNOSIS — L82.0 SEBORRHEIC KERATOSES, INFLAMED: Primary | ICD-10-CM

## 2018-10-09 PROCEDURE — 99212 OFFICE O/P EST SF 10 MIN: CPT | Mod: 25,S$PBB,, | Performed by: DERMATOLOGY

## 2018-10-09 PROCEDURE — 17111 DESTRUCTION B9 LESIONS 15/>: CPT | Mod: PO,PBBFAC | Performed by: DERMATOLOGY

## 2018-10-09 PROCEDURE — 99999 PR PBB SHADOW E&M-EST. PATIENT-LVL II: CPT | Mod: PBBFAC,,, | Performed by: DERMATOLOGY

## 2018-10-09 PROCEDURE — 17110 DESTRUCTION B9 LES UP TO 14: CPT | Mod: PBBFAC,PO | Performed by: DERMATOLOGY

## 2018-10-09 PROCEDURE — 1101F PT FALLS ASSESS-DOCD LE1/YR: CPT | Mod: CPTII,,, | Performed by: DERMATOLOGY

## 2018-10-09 PROCEDURE — 17111 DESTRUCTION B9 LESIONS 15/>: CPT | Mod: S$PBB,,, | Performed by: DERMATOLOGY

## 2018-10-09 PROCEDURE — 99212 OFFICE O/P EST SF 10 MIN: CPT | Mod: PBBFAC,PO | Performed by: DERMATOLOGY

## 2018-10-09 NOTE — PROGRESS NOTES
Subjective:       Patient ID:  Evan Garcia is a 72 y.o. male who presents for   Chief Complaint   Patient presents with    Seborrheic Keratosis     Pt here for f/u SK. Last seen 8/21/2018.  Biopsy sites healed well.     C/o lesion to upper left side of abd. Slightly raised, crusty and constantly hurts. Present for few years. Not treating.  Raised lesion near left armpit that has gradually gotten bigger and is brown in color. Present for a while now. Not treating.  Light colored raised lesion to central lower back.   Lesions all over back. Vary in color and size. Itch and some occasionally will bleed. Present for many years. Not treating.     Past Medical History:  No date: Ankylosing spondylitis      Comment:  thoracic;   No date: BPH (benign prostatic hypertrophy)  No date: Cholelithiases  No date: DDD (degenerative disc disease), lumbar      Comment:  treated with FILOMENA with success  No date: Fatty liver  No date: HTN (hypertension)  No date: Hypertriglyceridemia  No date: Left kidney mass      Comment:  followed by urology  No date: Tinnitus of both ears      Comment:  he consulted with ENT; He uses Xanax PRn  No date: Wears glasses      FINAL PATHOLOGIC DIAGNOSIS  1. Skin, right posterior scalp, shave biopsy:  -SEBORRHEIC KERATOSIS, IRRITATED AND INFLAMED  2. Skin, left forearm, shave biopsy:  -VERRUCOUS KERATOSIS, INFLAMED, see comment  COMMENT: The findings are not fully diagnostic, but this is a benign keratosis. This spectrum of changes can be  seen in irritated seborrheic keratoses or warts.  3. Skin, left nasal sidewall, shave biopsy:  -SEBORRHEIC KERATOSIS, INFLAMED  Diagnosed by: Uday Joshi  (Electronically Signed: 2018-08-24 09:01:08)  Microscopic Examination  1. The lesion is characterized by hyperkeratosis, acanthosis, papillomatosis, and focal excoriation of the surface.  There is dense chronic, focally lichenoid inflammation in the underlying superficial dermis.  2. The lesion is characterized  by papillomatosis, hyperkeratosis, and acanthosis. Significant cytologic pleomorphism  of keratinocytes is not identified.  3. 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   HENT: Negative for sore throat.    Respiratory: Negative for cough.    Gastrointestinal: Negative for nausea and vomiting.   Skin: Positive for itching, dry skin and wears hat. Negative for rash.        Objective:    Physical Exam   Constitutional: He appears well-developed and well-nourished. No distress.   HENT:   Mouth/Throat: Lips normal.    Eyes: Lids are normal.  No conjunctival no injection.   Neurological: He is alert and oriented to person, place, and time. He is not disoriented.   Psychiatric: He has a normal mood and affect.   Skin:   Areas Examined (abnormalities noted in diagram):   Scalp / Hair Palpated and Inspected  Head / Face Inspection Performed  Neck Inspection Performed  Chest / Axilla Inspection Performed  Abdomen Inspection Performed  Back Inspection Performed  RUE Inspected  LUE Inspection Performed                   Diagram Legend     Erythematous scaling macule/papule c/w actinic keratosis       Vascular papule c/w angioma      Pigmented verrucoid papule/plaque c/w seborrheic keratosis      Yellow umbilicated papule c/w sebaceous hyperplasia      Irregularly shaped tan macule c/w lentigo     1-2 mm smooth white papules consistent with Milia      Movable subcutaneous cyst with punctum c/w epidermal inclusion cyst      Subcutaneous movable cyst c/w pilar cyst      Firm pink to brown papule c/w dermatofibroma      Pedunculated fleshy papule(s) c/w skin tag(s)      Evenly pigmented macule c/w junctional nevus     Mildly variegated pigmented, slightly irregular-bordered macule c/w mildly atypical nevus      Flesh colored to evenly pigmented papule c/w intradermal nevus       Pink pearly papule/plaque c/w basal cell carcinoma      Erythematous  hyperkeratotic cursted plaque c/w SCC      Surgical scar with no sign of skin cancer recurrence      Open and closed comedones      Inflammatory papules and pustules      Verrucoid papule consistent consistent with wart     Erythematous eczematous patches and plaques     Dystrophic onycholytic nail with subungual debris c/w onychomycosis     Umbilicated papule    Erythematous-base heme-crusted tan verrucoid plaque consistent with inflamed seborrheic keratosis     Erythematous Silvery Scaling Plaque c/w Psoriasis     See annotation      Assessment / Plan:        Seborrheic keratoses, inflamed  Cryosurgery procedure note:    Verbal consent from the patient is obtained. Liquid nitrogen cryosurgery is applied to 22 lesions to produce a freeze injury. The patient is aware that blisters may form and is instructed on wound care with gentle cleansing and use of vaseline ointment to keep moist until healed. The patient is supplied a handout on cryosurgery and is instructed to call if lesions do not completely resolve. Risk of dyspigmentation discussed.       Seborrheic keratoses, trunk and extremities  These are benign inherited growths without a malignant potential. Reassurance given to patient. No treatment is necessary.                Follow-up in about 6 months (around 4/9/2019).

## 2018-11-26 ENCOUNTER — LAB VISIT (OUTPATIENT)
Dept: LAB | Facility: HOSPITAL | Age: 73
End: 2018-11-26
Attending: FAMILY MEDICINE
Payer: MEDICARE

## 2018-11-26 DIAGNOSIS — Z12.5 PROSTATE CANCER SCREENING: ICD-10-CM

## 2018-11-26 DIAGNOSIS — E78.1 HYPERTRIGLYCERIDEMIA: ICD-10-CM

## 2018-11-26 LAB
ALBUMIN SERPL BCP-MCNC: 4 G/DL
ALP SERPL-CCNC: 53 U/L
ALT SERPL W/O P-5'-P-CCNC: 54 U/L
ANION GAP SERPL CALC-SCNC: 7 MMOL/L
AST SERPL-CCNC: 35 U/L
BILIRUB SERPL-MCNC: 1 MG/DL
BUN SERPL-MCNC: 17 MG/DL
CALCIUM SERPL-MCNC: 9.4 MG/DL
CHLORIDE SERPL-SCNC: 108 MMOL/L
CHOLEST SERPL-MCNC: 176 MG/DL
CHOLEST/HDLC SERPL: 6.1 {RATIO}
CO2 SERPL-SCNC: 27 MMOL/L
COMPLEXED PSA SERPL-MCNC: 1.4 NG/ML
CREAT SERPL-MCNC: 1 MG/DL
EST. GFR  (AFRICAN AMERICAN): >60 ML/MIN/1.73 M^2
EST. GFR  (NON AFRICAN AMERICAN): >60 ML/MIN/1.73 M^2
GLUCOSE SERPL-MCNC: 118 MG/DL
HDLC SERPL-MCNC: 29 MG/DL
HDLC SERPL: 16.5 %
LDLC SERPL CALC-MCNC: 100.2 MG/DL
NONHDLC SERPL-MCNC: 147 MG/DL
POTASSIUM SERPL-SCNC: 4 MMOL/L
PROT SERPL-MCNC: 7.6 G/DL
SODIUM SERPL-SCNC: 142 MMOL/L
TRIGL SERPL-MCNC: 234 MG/DL

## 2018-11-26 PROCEDURE — 36415 COLL VENOUS BLD VENIPUNCTURE: CPT | Mod: PO

## 2018-11-26 PROCEDURE — 84153 ASSAY OF PSA TOTAL: CPT

## 2018-11-26 PROCEDURE — 80053 COMPREHEN METABOLIC PANEL: CPT

## 2018-11-26 PROCEDURE — 80061 LIPID PANEL: CPT

## 2018-11-28 ENCOUNTER — OFFICE VISIT (OUTPATIENT)
Dept: FAMILY MEDICINE | Facility: CLINIC | Age: 73
End: 2018-11-28
Payer: MEDICARE

## 2018-11-28 VITALS
SYSTOLIC BLOOD PRESSURE: 150 MMHG | WEIGHT: 199.31 LBS | RESPIRATION RATE: 18 BRPM | OXYGEN SATURATION: 96 % | HEART RATE: 65 BPM | BODY MASS INDEX: 29.52 KG/M2 | HEIGHT: 69 IN | DIASTOLIC BLOOD PRESSURE: 82 MMHG

## 2018-11-28 DIAGNOSIS — R42 VERTIGO: Primary | ICD-10-CM

## 2018-11-28 DIAGNOSIS — H93.13 TINNITUS OF BOTH EARS: ICD-10-CM

## 2018-11-28 DIAGNOSIS — H93.19 TINNITUS, UNSPECIFIED LATERALITY: ICD-10-CM

## 2018-11-28 DIAGNOSIS — M77.8 SHOULDER TENDONITIS, RIGHT: ICD-10-CM

## 2018-11-28 DIAGNOSIS — K80.20 GALLSTONES: ICD-10-CM

## 2018-11-28 PROCEDURE — 1101F PT FALLS ASSESS-DOCD LE1/YR: CPT | Mod: CPTII,S$GLB,, | Performed by: FAMILY MEDICINE

## 2018-11-28 PROCEDURE — 99214 OFFICE O/P EST MOD 30 MIN: CPT | Mod: S$GLB,,, | Performed by: FAMILY MEDICINE

## 2018-11-28 PROCEDURE — 3079F DIAST BP 80-89 MM HG: CPT | Mod: CPTII,S$GLB,, | Performed by: FAMILY MEDICINE

## 2018-11-28 PROCEDURE — 3077F SYST BP >= 140 MM HG: CPT | Mod: CPTII,S$GLB,, | Performed by: FAMILY MEDICINE

## 2018-11-28 PROCEDURE — 99999 PR PBB SHADOW E&M-EST. PATIENT-LVL IV: CPT | Mod: PBBFAC,,, | Performed by: FAMILY MEDICINE

## 2018-11-28 RX ORDER — ALPRAZOLAM 0.5 MG/1
0.5 TABLET ORAL NIGHTLY PRN
Qty: 30 TABLET | Refills: 5 | Status: SHIPPED | OUTPATIENT
Start: 2018-11-28 | End: 2019-05-28 | Stop reason: SDUPTHER

## 2018-11-28 NOTE — PROGRESS NOTES
Subjective:       Patient ID: Evan Garcia is a 73 y.o. male.    Chief Complaint: Follow-up and Dizzness    HPI Comments: Here today for 6 month  f/u on chronic health issues.    HTN - tolerating amlodipine, atenolol. BPs at home within normal range.  BPH - tolerating Proscar daily; urine flow has been good  Tinnitus- using Xanax at bedtime to sleep. This is continuous.  Vertigo still happens most day when looking upwards.  H/o gallstones - c/o intermittent RUQ pains after meals      Past Medical History:   Diagnosis Date    Ankylosing spondylitis     thoracic;     BPH (benign prostatic hypertrophy)     Cholelithiases     DDD (degenerative disc disease), lumbar     treated with FILOMENA with success    Fatty liver     HTN (hypertension)     Hypertriglyceridemia     Left kidney mass     followed by urology, Dr. De Guzman    Tinnitus of both ears     he consulted with ENT; He uses Xanax PRn    Wears glasses        Past Surgical History:   Procedure Laterality Date    KNEE SURGERY      right; 3 separate surgeries to repair patellar fracture    REPAIR-HERNIA-INGUINAL - Left Left 12/9/2016    [facility]    stapendectomy Bilateral 1985    bilateraly    WISDOM TOOTH EXTRACTION         Allergies   Allergen Reactions    Sulfa (Sulfonamide Antibiotics) Hives and Other (See Comments)     Decreased white blood count per pt       Social History     Socioeconomic History    Marital status:      Spouse name: Not on file    Number of children: 3    Years of education: Not on file    Highest education level: Not on file   Social Needs    Financial resource strain: Not on file    Food insecurity - worry: Not on file    Food insecurity - inability: Not on file    Transportation needs - medical: Not on file    Transportation needs - non-medical: Not on file   Occupational History    Occupation: retired [employer]   Tobacco Use    Smoking status: Former Smoker     Types:  Cigars, Cigarettes     Start date: 4/24/2014    Smokeless tobacco: Never Used    Tobacco comment: will smoke a cigar occasionally   Substance and Sexual Activity    Alcohol use: Yes     Comment: occasional    Drug use: No    Sexual activity: Not on file   Other Topics Concern    Not on file   Social History Narrative    Hobbies: fish, hunt        Exercise: [employer] 3 times a week with treadmill           Current Outpatient Medications on File Prior to Visit   Medication Sig Dispense Refill    amLODIPine (NORVASC) 10 MG tablet TAKE 1 TABLET (10 MG TOTAL) BY MOUTH ONCE DAILY. 90 tablet 2    atenolol (TENORMIN) 100 MG tablet TAKE 1 TABLET (100 MG TOTAL) BY MOUTH ONCE DAILY. 90 tablet 3    finasteride (PROSCAR) 5 mg tablet TAKE 1 TABLET (5 MG TOTAL) BY MOUTH ONCE DAILY. 30 tablet 10    naproxen (NAPROSYN) 500 MG tablet Take 1 tablet (500 mg total) by mouth 2 (two) times daily with meals. 20 tablet 0     No current facility-administered medications on file prior to visit.        Family History   Problem Relation Age of Onset    Aneurysm Father     Cancer Mother         stomach    Diabetes Mother     Cancer Brother         salivary     Review of Systems   Constitutional: Negative for fever, appetite change, fatigue and unexpected weight change.   HENT: Negative for ear pain, nosebleeds, congestion, sore throat, rhinorrhea, trouble swallowing, neck pain, postnasal drip, sinus pressure and ear discharge.    Respiratory: Negative for apnea, cough, chest tightness, shortness of breath and wheezing.    Cardiovascular: Negative for chest pain, palpitations and leg swelling.   Gastrointestinal: Negative for nausea, vomiting, abdominal pain, diarrhea, constipation, blood in stool and abdominal distention.   Genitourinary: Negative for dysuria, urgency, frequency, hematuria, flank pain, scrotal swelling, difficulty urinating and testicular pain.   Skin: Negative for color change, rash and wound.   Neurological:  "Negative for dizziness, tremors, seizures, syncope, speech difficulty, weakness, light-headedness, numbness and headaches.   Hematological: Negative for adenopathy. Does not bruise/bleed easily.   Psychiatric/Behavioral: Negative for suicidal ideas, hallucinations, behavioral problems and confusion.       Objective:      BP (!) 150/82   Pulse 65   Resp 18   Ht 5' 9" (1.753 m)   Wt 90.4 kg (199 lb 4.7 oz)   SpO2 96%   BMI 29.43 kg/m²   Physical Exam   Constitutional: He is oriented to person, place, and time. He appears well-developed and well-nourished. He is active and cooperative.   Nose: Nose normal.   Eyelid hypertrophy bilaterally  Mouth/Throat: Oropharynx is clear and moist. No oropharyngeal exudate.   Eyes: Conjunctivae and EOM are normal. Pupils are equal, round, and reactive to light. Right eye exhibits no discharge. Left eye exhibits no discharge. No scleral icterus.   Neck: Trachea normal, normal range of motion and full passive range of motion without pain. Neck supple. Normal carotid pulses and no JVD present. Carotid bruit is not present. No tracheal deviation present. No mass and no thyromegaly present.   Cardiovascular: Normal rate, regular rhythm, S1 normal, S2 normal, normal heart sounds and intact distal pulses.  Exam reveals no gallop and no friction rub.  No murmur heard.  Pulses:       Carotid pulses are 2+ on the right side, and 2+ on the left side.       Radial pulses are 2+ on the right side, and 2+ on the left side.        Dorsalis pedis pulses are 2+ on the right side, and 2+ on the left side.   Pulmonary/Chest: Effort normal and breath sounds normal. No respiratory distress. He has no wheezes. He has no rales.   Lymphadenopathy:        Head (right side): No tonsillar adenopathy present.        Head (left side): No tonsillar adenopathy present.     He has no cervical adenopathy.   Neurological: He is alert and oriented to person, place, and time. He has normal strength. No cranial " nerve deficit. Coordination normal.   Psychiatric: He has a normal mood and affect. His speech is normal and behavior is normal. Judgment and thought content normal.   Right shoulder: some crepitus with ROM. Full AROM; pain with abduction > 90 degrees        Results for orders placed or performed in visit on 11/26/18   Comprehensive metabolic panel   Result Value Ref Range    Sodium 142 136 - 145 mmol/L    Potassium 4.0 3.5 - 5.1 mmol/L    Chloride 108 95 - 110 mmol/L    CO2 27 23 - 29 mmol/L    Glucose 118 (H) 70 - 110 mg/dL    BUN, Bld 17 8 - 23 mg/dL    Creatinine 1.0 0.5 - 1.4 mg/dL    Calcium 9.4 8.7 - 10.5 mg/dL    Total Protein 7.6 6.0 - 8.4 g/dL    Albumin 4.0 3.5 - 5.2 g/dL    Total Bilirubin 1.0 0.1 - 1.0 mg/dL    Alkaline Phosphatase 53 (L) 55 - 135 U/L    AST 35 10 - 40 U/L    ALT 54 (H) 10 - 44 U/L    Anion Gap 7 (L) 8 - 16 mmol/L    eGFR if African American >60.0 >60 mL/min/1.73 m^2    eGFR if non African American >60.0 >60 mL/min/1.73 m^2   Lipid panel   Result Value Ref Range    Cholesterol 176 120 - 199 mg/dL    Triglycerides 234 (H) 30 - 150 mg/dL    HDL 29 (L) 40 - 75 mg/dL    LDL Cholesterol 100.2 63.0 - 159.0 mg/dL    HDL/Chol Ratio 16.5 (L) 20.0 - 50.0 %    Total Cholesterol/HDL Ratio 6.1 (H) 2.0 - 5.0    Non-HDL Cholesterol 147 mg/dL   PSA, Screening   Result Value Ref Range    PSA, SCREEN 1.4 0.00 - 4.00 ng/mL     Assessment:       1. Vertigo    2. Tinnitus, unspecified laterality    3. Gallstones    4. Shoulder tendonitis, right    5. Tinnitus of both ears        Plan:       Vertigo  -     Ambulatory referral to ENT    Tinnitus, unspecified laterality  -     Ambulatory referral to ENT    Gallstones    Shoulder tendonitis, right    Tinnitus of both ears  -     ALPRAZolam (XANAX) 0.5 MG tablet; Take 1 tablet (0.5 mg total) by mouth nightly as needed (tinitus).  Dispense: 30 tablet; Refill: 5       Gallbladder flush. Recipe and handout given  Pendulum exercises for right shoulder  Continue  present meds  Counseled on regular exercise, maintenance of a healthy weight, balanced diet rich in fruits/vegetables and lean protein, and avoidance of unhealthy habits like smoking and excessive alcohol intake.  Continue low fat diet  F/u 6 months to refill xanax or PRN

## 2018-12-11 ENCOUNTER — CLINICAL SUPPORT (OUTPATIENT)
Dept: AUDIOLOGY | Facility: CLINIC | Age: 73
End: 2018-12-11
Payer: MEDICARE

## 2018-12-11 ENCOUNTER — OFFICE VISIT (OUTPATIENT)
Dept: OTOLARYNGOLOGY | Facility: CLINIC | Age: 73
End: 2018-12-11
Payer: MEDICARE

## 2018-12-11 VITALS — WEIGHT: 199.31 LBS | BODY MASS INDEX: 29.52 KG/M2 | HEIGHT: 69 IN

## 2018-12-11 DIAGNOSIS — R42 DIZZINESS: ICD-10-CM

## 2018-12-11 DIAGNOSIS — H90.A32 MIXED CONDUCTIVE AND SENSORINEURAL HEARING LOSS OF LEFT EAR WITH RESTRICTED HEARING OF RIGHT EAR: ICD-10-CM

## 2018-12-11 DIAGNOSIS — H93.19 TINNITUS, UNSPECIFIED LATERALITY: Primary | ICD-10-CM

## 2018-12-11 DIAGNOSIS — H90.3 SENSORINEURAL HEARING LOSS (SNHL), BILATERAL: Primary | ICD-10-CM

## 2018-12-11 DIAGNOSIS — H93.13 TINNITUS, BILATERAL: ICD-10-CM

## 2018-12-11 DIAGNOSIS — H90.A21 SENSORINEURAL HEARING LOSS (SNHL) OF RIGHT EAR WITH RESTRICTED HEARING OF LEFT EAR: ICD-10-CM

## 2018-12-11 PROCEDURE — 99999 PR PBB SHADOW E&M-EST. PATIENT-LVL I: CPT | Mod: PBBFAC,,,

## 2018-12-11 PROCEDURE — 99204 OFFICE O/P NEW MOD 45 MIN: CPT | Mod: S$GLB,,, | Performed by: OTOLARYNGOLOGY

## 2018-12-11 PROCEDURE — 1101F PT FALLS ASSESS-DOCD LE1/YR: CPT | Mod: CPTII,S$GLB,, | Performed by: OTOLARYNGOLOGY

## 2018-12-11 PROCEDURE — 92557 COMPREHENSIVE HEARING TEST: CPT | Mod: S$GLB,,, | Performed by: AUDIOLOGIST

## 2018-12-11 PROCEDURE — 92567 TYMPANOMETRY: CPT | Mod: S$GLB,,, | Performed by: AUDIOLOGIST

## 2018-12-11 PROCEDURE — 99999 PR PBB SHADOW E&M-EST. PATIENT-LVL II: CPT | Mod: PBBFAC,,, | Performed by: OTOLARYNGOLOGY

## 2018-12-11 NOTE — LETTER
December 11, 2018      Zion Yao MD  [address] [facility] [address] [place] [address]           [place] [facility]  [address] [facility] [address] [place] [address]  Phone: [phone]  Fax: [phone]          Patient: Evan Garcia   MR Number: [id number]   YOB: 1945   Date of Visit: 12/11/2018       Dear Dr. Zion Yao:    Thank you for referring Evan Garcia to me for evaluation. Attached you will find relevant portions of my assessment and plan of care.    If you have questions, please do not hesitate to call me. I look forward to following Evan Garcia along with you.    Sincerely,    Marko Marr MD    Enclosure  CC:  No Recipients    If you would like to receive this communication electronically, please contact [email or web address] or [phone] to request more information on [facility] access.    For providers and/or their staff who would like to refer a patient to [facility], please contact us through our one-stop-shop provider referral line, [facility], at [phone].    If you feel you have received this communication in error or would no longer like to receive these types of communications, please e-mail [email or web address]

## 2018-12-11 NOTE — PROGRESS NOTES
Subjective:       Patient ID: Evan Garcia is a 73 y.o. male.    Chief Complaint: Cerumen Impaction and Tinnitus    Evan is here for tinnitus.   Length of symptoms: 5-6 years, stable. Some days much worse than others. Constant. Worse at night. Has tried Lipoflavanoids, white noise, melatonin, and Xanax without improvement. Tinnitus not as bothersome during the day. Hearing is subjectively OK.     History of stapedectomy early 90s with improvement in symptoms.   He also reports dysequilibrium, off balance sensation when he goes from sitting to standing. Once he is up and moving, this is improved. No PT in the past. Denies neuropathy or major visual disturbances. No fluctuations in hearing.        Social History     Tobacco Use   Smoking Status Former Smoker    Types: Cigars, Cigarettes    Start date: 4/24/2014   Smokeless Tobacco Never Used   Tobacco Comment    will smoke a cigar occasionally     Social History     Substance and Sexual Activity   Alcohol Use Yes    Comment: occasional          Review of Systems   Constitutional: Negative for activity change and appetite change.   Eyes: Negative for discharge.   Respiratory: Negative for difficulty breathing and wheezing   Cardiovascular: Negative for chest pain.   Gastrointestinal: Negative for abdominal distention and abdominal pain.   Endocrine: Negative for cold intolerance and heat intolerance.   Genitourinary: Negative for dysuria.   Musculoskeletal: Negative for gait problem and joint swelling.   Skin: Negative for color change and pallor.   Neurological: Negative for syncope and weakness.   Psychiatric/Behavioral: Negative for agitation and confusion.     Objective:        Constitutional:   He is oriented to person, place, and time. Vital signs are normal. He appears well-developed and well-nourished. He appears alert. He is active. Normal speech.      Head:  Normocephalic and atraumatic. Head is without TMJ tenderness. No scars. Salivary glands normal.   Facial strength is normal.      Ears:    Right Ear: No drainage or swelling. No middle ear effusion.   Left Ear: No drainage or swelling.  No middle ear effusion.   Mild cerumen AD    Nose:  No mucosal edema, rhinorrhea or sinus tenderness. No turbinate hypertrophy.      Mouth/Throat  Oropharynx clear and moist without lesions or asymmetry, normal uvula midline and mirror exam normal. Normal dentition. No uvula swelling, lacerations or trismus. No oropharyngeal exudate. Tonsillar erythema, tonsillar exudate.      Neck:  Full range of motion with neck supple and no adenopathy. Thyroid tenderness is present. No tracheal deviation, no edema, no erythema, normal range of motion, no stridor, no crepitus and no neck rigidity present. No thyroid mass present.     Cardiovascular:   Intact distal pulses and normal pulses.      Pulmonary/Chest:   Effort normal and breath sounds normal. No stridor.     Psychiatric:   His speech is normal and behavior is normal. His mood appears not anxious. His affect is not labile.     Neurological:   He is alert and oriented to person, place, and time. No sensory deficit.     Skin:   No abrasions, lacerations, lesions, or rashes. No abrasion and no bruising noted.         Tests / Results:          Assessment:       1. Sensorineural hearing loss (SNHL), bilateral    2. Dizziness    3. Tinnitus, bilateral          Plan:       Tinnitus related to HF SNHL. He has tried multiple modalities without improvement. TOMLIN would benefit during day but not necessarily at night when he symptoms are worst.   Discussed TRT as a possibility, but need to look into if anyone around is doing this.  Low suspicion for inner cause to dizziness; however, if persistent, offered VNG. He will think about it and let us know.  Continue routine audiometric testing

## 2019-03-18 DIAGNOSIS — N40.0 BENIGN NON-NODULAR PROSTATIC HYPERPLASIA WITHOUT LOWER URINARY TRACT SYMPTOMS: ICD-10-CM

## 2019-03-19 RX ORDER — FINASTERIDE 5 MG/1
5 TABLET, FILM COATED ORAL DAILY
Qty: 90 TABLET | Refills: 3 | Status: SHIPPED | OUTPATIENT
Start: 2019-03-19 | End: 2020-05-25 | Stop reason: SDUPTHER

## 2019-03-19 NOTE — PROGRESS NOTES
Refill Authorization Note     is requesting a refill authorization.    Brief assessment and rationale for refill: ROUTE: op  Name and strength of medication: finasteride (PROSCAR) 5 mg tablet       Medication Therapy Plan: BPH- tolerating proscar, lco(lov); BP-controlled; outside of protocol route to you     Medication reconciliation completed: No              How patient will take medication: t1t po qd          Comments:   APPOINTMENTS (past 12 m or future 3m authorizing provider)  LAST VISIT DATE  Zion Yao MD 11/28/2018         NEXT VISIT DATE  Zion Yao MD 5/28/2019

## 2019-04-01 DIAGNOSIS — I10 ESSENTIAL HYPERTENSION: ICD-10-CM

## 2019-04-02 RX ORDER — AMLODIPINE BESYLATE 10 MG/1
10 TABLET ORAL DAILY
Qty: 90 TABLET | Refills: 1 | Status: SHIPPED | OUTPATIENT
Start: 2019-04-02 | End: 2019-09-20 | Stop reason: SDUPTHER

## 2019-04-02 NOTE — PROGRESS NOTES
Refill Authorization Note     is requesting a refill authorization.    Brief assessment and rationale for refill: APPROVE: prr  Name and strength of medication: AMLODIPINE BESYLATE 10 MG TAB       Medication Therapy Plan: Bp elevated at LOV but typically commented as controlled; edwin 6 more                   How patient will take medication: t1t po daily           Comments:   BP Readings from Last 3 Encounters:   11/28/18 (!) 150/82   05/28/18 138/82   11/28/17 122/84

## 2019-05-16 ENCOUNTER — PATIENT OUTREACH (OUTPATIENT)
Dept: ADMINISTRATIVE | Facility: HOSPITAL | Age: 74
End: 2019-05-16

## 2019-05-16 NOTE — PROGRESS NOTES
Health Maintenance Due   Topic Date Due    Shingles Vaccine (2 of 3) 07/13/2015     Chart review complete/scrubbed 05/16/2019  Future Appointments   Date Time Provider Department Center   5/28/2019  9:40 AM Zion Yao MD [facility]

## 2019-05-28 ENCOUNTER — TELEPHONE (OUTPATIENT)
Dept: ORTHOPEDICS | Facility: CLINIC | Age: 74
End: 2019-05-28

## 2019-05-28 ENCOUNTER — OFFICE VISIT (OUTPATIENT)
Dept: ORTHOPEDICS | Facility: CLINIC | Age: 74
End: 2019-05-28
Payer: MEDICARE

## 2019-05-28 ENCOUNTER — OFFICE VISIT (OUTPATIENT)
Dept: FAMILY MEDICINE | Facility: CLINIC | Age: 74
End: 2019-05-28
Payer: MEDICARE

## 2019-05-28 ENCOUNTER — HOSPITAL ENCOUNTER (OUTPATIENT)
Dept: RADIOLOGY | Facility: HOSPITAL | Age: 74
Discharge: HOME | End: 2019-05-28
Attending: FAMILY MEDICINE
Payer: MEDICARE

## 2019-05-28 VITALS
HEIGHT: 69 IN | HEART RATE: 57 BPM | SYSTOLIC BLOOD PRESSURE: 154 MMHG | DIASTOLIC BLOOD PRESSURE: 81 MMHG | BODY MASS INDEX: 28.88 KG/M2 | WEIGHT: 195 LBS

## 2019-05-28 VITALS
OXYGEN SATURATION: 96 % | WEIGHT: 199.31 LBS | HEIGHT: 69 IN | SYSTOLIC BLOOD PRESSURE: 118 MMHG | RESPIRATION RATE: 18 BRPM | TEMPERATURE: 98 F | HEART RATE: 61 BPM | DIASTOLIC BLOOD PRESSURE: 82 MMHG | BODY MASS INDEX: 29.52 KG/M2

## 2019-05-28 DIAGNOSIS — M25.511 CHRONIC RIGHT SHOULDER PAIN: ICD-10-CM

## 2019-05-28 DIAGNOSIS — E78.1 HYPERTRIGLYCERIDEMIA: ICD-10-CM

## 2019-05-28 DIAGNOSIS — H93.13 TINNITUS OF BOTH EARS: ICD-10-CM

## 2019-05-28 DIAGNOSIS — Z12.5 PROSTATE CANCER SCREENING: ICD-10-CM

## 2019-05-28 DIAGNOSIS — M25.511 CHRONIC RIGHT SHOULDER PAIN: Primary | ICD-10-CM

## 2019-05-28 DIAGNOSIS — M75.121 COMPLETE TEAR OF RIGHT ROTATOR CUFF: Primary | ICD-10-CM

## 2019-05-28 DIAGNOSIS — I10 ESSENTIAL HYPERTENSION: ICD-10-CM

## 2019-05-28 DIAGNOSIS — S46.001S: ICD-10-CM

## 2019-05-28 DIAGNOSIS — G89.29 CHRONIC RIGHT SHOULDER PAIN: ICD-10-CM

## 2019-05-28 DIAGNOSIS — G89.29 CHRONIC RIGHT SHOULDER PAIN: Primary | ICD-10-CM

## 2019-05-28 PROCEDURE — 73030 X-RAY EXAM OF SHOULDER: CPT | Mod: TC,FY,PO,RT

## 2019-05-28 PROCEDURE — 20610 DRAIN/INJ JOINT/BURSA W/O US: CPT | Mod: RT,S$GLB,, | Performed by: ORTHOPAEDIC SURGERY

## 2019-05-28 PROCEDURE — 3074F SYST BP LT 130 MM HG: CPT | Mod: CPTII,S$GLB,, | Performed by: ORTHOPAEDIC SURGERY

## 2019-05-28 PROCEDURE — 20610 LARGE JOINT ASPIRATION/INJECTION: R SUBACROMIAL BURSA: ICD-10-PCS | Mod: RT,S$GLB,, | Performed by: ORTHOPAEDIC SURGERY

## 2019-05-28 PROCEDURE — 3079F DIAST BP 80-89 MM HG: CPT | Mod: CPTII,S$GLB,, | Performed by: FAMILY MEDICINE

## 2019-05-28 PROCEDURE — 3074F PR MOST RECENT SYSTOLIC BLOOD PRESSURE < 130 MM HG: ICD-10-PCS | Mod: CPTII,S$GLB,, | Performed by: FAMILY MEDICINE

## 2019-05-28 PROCEDURE — 3079F PR MOST RECENT DIASTOLIC BLOOD PRESSURE 80-89 MM HG: ICD-10-PCS | Mod: CPTII,S$GLB,, | Performed by: FAMILY MEDICINE

## 2019-05-28 PROCEDURE — 99499 UNLISTED E&M SERVICE: CPT | Mod: S$GLB,,, | Performed by: FAMILY MEDICINE

## 2019-05-28 PROCEDURE — 1101F PT FALLS ASSESS-DOCD LE1/YR: CPT | Mod: CPTII,S$GLB,, | Performed by: ORTHOPAEDIC SURGERY

## 2019-05-28 PROCEDURE — 99214 PR OFFICE/OUTPT VISIT, EST, LEVL IV, 30-39 MIN: ICD-10-PCS | Mod: S$GLB,,, | Performed by: FAMILY MEDICINE

## 2019-05-28 PROCEDURE — 99204 PR OFFICE/OUTPT VISIT, NEW, LEVL IV, 45-59 MIN: ICD-10-PCS | Mod: 25,S$GLB,, | Performed by: ORTHOPAEDIC SURGERY

## 2019-05-28 PROCEDURE — 1101F PT FALLS ASSESS-DOCD LE1/YR: CPT | Mod: CPTII,S$GLB,, | Performed by: FAMILY MEDICINE

## 2019-05-28 PROCEDURE — 1101F PR PT FALLS ASSESS DOC 0-1 FALLS W/OUT INJ PAST YR: ICD-10-PCS | Mod: CPTII,S$GLB,, | Performed by: ORTHOPAEDIC SURGERY

## 2019-05-28 PROCEDURE — 1101F PR PT FALLS ASSESS DOC 0-1 FALLS W/OUT INJ PAST YR: ICD-10-PCS | Mod: CPTII,S$GLB,, | Performed by: FAMILY MEDICINE

## 2019-05-28 PROCEDURE — 99999 PR PBB SHADOW E&M-EST. PATIENT-LVL III: ICD-10-PCS | Mod: PBBFAC,,, | Performed by: FAMILY MEDICINE

## 2019-05-28 PROCEDURE — 3079F DIAST BP 80-89 MM HG: CPT | Mod: CPTII,S$GLB,, | Performed by: ORTHOPAEDIC SURGERY

## 2019-05-28 PROCEDURE — 99999 PR PBB SHADOW E&M-EST. PATIENT-LVL III: CPT | Mod: PBBFAC,,, | Performed by: FAMILY MEDICINE

## 2019-05-28 PROCEDURE — 99214 OFFICE O/P EST MOD 30 MIN: CPT | Mod: S$GLB,,, | Performed by: FAMILY MEDICINE

## 2019-05-28 PROCEDURE — 3074F SYST BP LT 130 MM HG: CPT | Mod: CPTII,S$GLB,, | Performed by: FAMILY MEDICINE

## 2019-05-28 PROCEDURE — 99999 PR PBB SHADOW E&M-EST. PATIENT-LVL III: ICD-10-PCS | Mod: PBBFAC,,, | Performed by: ORTHOPAEDIC SURGERY

## 2019-05-28 PROCEDURE — 3079F PR MOST RECENT DIASTOLIC BLOOD PRESSURE 80-89 MM HG: ICD-10-PCS | Mod: CPTII,S$GLB,, | Performed by: ORTHOPAEDIC SURGERY

## 2019-05-28 PROCEDURE — 99999 PR PBB SHADOW E&M-EST. PATIENT-LVL III: CPT | Mod: PBBFAC,,, | Performed by: ORTHOPAEDIC SURGERY

## 2019-05-28 PROCEDURE — 3074F PR MOST RECENT SYSTOLIC BLOOD PRESSURE < 130 MM HG: ICD-10-PCS | Mod: CPTII,S$GLB,, | Performed by: ORTHOPAEDIC SURGERY

## 2019-05-28 PROCEDURE — 73030 XR SHOULDER COMPLETE 2 OR MORE VIEWS RIGHT: ICD-10-PCS | Mod: 26,RT,, | Performed by: RADIOLOGY

## 2019-05-28 PROCEDURE — 99204 OFFICE O/P NEW MOD 45 MIN: CPT | Mod: 25,S$GLB,, | Performed by: ORTHOPAEDIC SURGERY

## 2019-05-28 PROCEDURE — 73030 X-RAY EXAM OF SHOULDER: CPT | Mod: 26,RT,, | Performed by: RADIOLOGY

## 2019-05-28 PROCEDURE — 99499 RISK ADDL DX/OHS AUDIT: ICD-10-PCS | Mod: S$GLB,,, | Performed by: FAMILY MEDICINE

## 2019-05-28 RX ORDER — ALPRAZOLAM 0.5 MG/1
0.5 TABLET ORAL NIGHTLY PRN
Qty: 30 TABLET | Refills: 5 | Status: SHIPPED | OUTPATIENT
Start: 2019-05-28 | End: 2019-11-25 | Stop reason: SDUPTHER

## 2019-05-28 RX ORDER — MELOXICAM 15 MG/1
15 TABLET ORAL DAILY
Qty: 30 TABLET | Refills: 3 | Status: SHIPPED | OUTPATIENT
Start: 2019-05-28 | End: 2020-05-25

## 2019-05-28 RX ADMIN — TRIAMCINOLONE ACETONIDE 40 MG: 40 INJECTION, SUSPENSION INTRA-ARTICULAR; INTRAMUSCULAR at 02:05

## 2019-05-28 NOTE — PROGRESS NOTES
Subjective:       Patient ID: Evan Garcia is a 73 y.o. male.    Chief Complaint: Follow-up (issues with right shoulder, noticed about a year ago, its getting worse losing strength)    HPI Comments: Here today for 6 month  f/u on chronic health issues.    HTN - tolerating amlodipine, atenolol. BPs at home within normal range.  BPH - tolerating Proscar daily; urine flow has been good  Tinnitus- using Xanax at bedtime to sleep. This is continuous.  Vertigo still happens most day when looking upwards.  H/o gallstones - c/o intermittent RUQ pains after meals    C/o persistent right shoulder pain with abduction. Subjective weakness above 90 degrees. It is now interfering with sleep.      Past Medical History:   Diagnosis Date    Ankylosing spondylitis     thoracic;     BPH (benign prostatic hypertrophy)     Cholelithiases     DDD (degenerative disc disease), lumbar     treated with FILOMENA with success    Fatty liver     HTN (hypertension)     Hypertriglyceridemia     Left kidney mass     followed by urology, Dr. De Guzman    Tinnitus of both ears     he consulted with ENT; He uses Xanax PRn    Wears glasses        Past Surgical History:   Procedure Laterality Date    KNEE SURGERY      right; 3 separate surgeries to repair patellar fracture    REPAIR-HERNIA-INGUINAL - Left Left 12/9/2016    [facility]    stapendectomy Bilateral 1985    bilateraly    WISDOM TOOTH EXTRACTION         Allergies   Allergen Reactions    Sulfa (Sulfonamide Antibiotics) Hives and Other (See Comments)     Decreased white blood count per pt       Social History     Socioeconomic History    Marital status:      Spouse name: Not on file    Number of children: 3    Years of education: Not on file    Highest education level: Not on file   Occupational History    Occupation: retired ref[employer]   Social Needs    Financial resource strain: Not on file    Food insecurity:     Worry: Not on file      Inability: Not on file    Transportation needs:     Medical: Not on file     Non-medical: Not on file   Tobacco Use    Smoking status: Former Smoker     Types: Cigars, Cigarettes     Start date: 4/24/2014    Smokeless tobacco: Never Used    Tobacco comment: will smoke a cigar occasionally   Substance and Sexual Activity    Alcohol use: Yes     Comment: occasional    Drug use: No    Sexual activity: Not on file   Lifestyle    Physical activity:     Days per week: Not on file     Minutes per session: Not on file    Stress: Not on file   Relationships    Social connections:     Talks on phone: Not on file     Gets together: Not on file     Attends Voodoo service: Not on file     Active member of club or organization: Not on file     Attends meetings of clubs or organizations: Not on file     Relationship status: Not on file   Other Topics Concern    Not on file   Social History Narrative    Hobbies: fish, hunt        Exercise: [employer] 3 times a week with treadmill           Current Outpatient Medications on File Prior to Visit   Medication Sig Dispense Refill    amLODIPine (NORVASC) 10 MG tablet TAKE 1 TABLET (10 MG TOTAL) BY MOUTH ONCE DAILY. 90 tablet 1    atenolol (TENORMIN) 100 MG tablet TAKE 1 TABLET (100 MG TOTAL) BY MOUTH ONCE DAILY. 90 tablet 3    finasteride (PROSCAR) 5 mg tablet TAKE 1 TABLET (5 MG TOTAL) BY MOUTH ONCE DAILY. 90 tablet 3    [DISCONTINUED] ALPRAZolam (XANAX) 0.5 MG tablet Take 1 tablet (0.5 mg total) by mouth nightly as needed (tinitus). 30 tablet 5    [DISCONTINUED] naproxen (NAPROSYN) 500 MG tablet Take 1 tablet (500 mg total) by mouth 2 (two) times daily with meals. 20 tablet 0     No current facility-administered medications on file prior to visit.        Family History   Problem Relation Age of Onset    Aneurysm Father     Cancer Mother         stomach    Diabetes Mother     Cancer Brother         salivary     Review of Systems   Constitutional: Negative for  "fever, appetite change, fatigue and unexpected weight change.   HENT: Negative for ear pain, nosebleeds, congestion, sore throat, rhinorrhea, trouble swallowing, neck pain, postnasal drip, sinus pressure and ear discharge.    Respiratory: Negative for apnea, cough, chest tightness, shortness of breath and wheezing.    Cardiovascular: Negative for chest pain, palpitations and leg swelling.   Gastrointestinal: Negative for nausea, vomiting, abdominal pain, diarrhea, constipation, blood in stool and abdominal distention.   Genitourinary: Negative for dysuria, urgency, frequency, hematuria, flank pain, scrotal swelling, difficulty urinating and testicular pain.   Skin: Negative for color change, rash and wound.   Neurological: Negative for dizziness, tremors, seizures, syncope, speech difficulty, weakness, light-headedness, numbness and headaches.   Hematological: Negative for adenopathy. Does not bruise/bleed easily.   Psychiatric/Behavioral: Negative for suicidal ideas, hallucinations, behavioral problems and confusion.       Objective:      /82 (BP Location: Left arm, Patient Position: Sitting)   Pulse 61   Temp 97.8 °F (36.6 °C)   Resp 18   Ht 5' 9" (1.753 m)   Wt 90.4 kg (199 lb 4.7 oz)   SpO2 96%   BMI 29.43 kg/m²   Physical Exam   Constitutional: He is oriented to person, place, and time. He appears well-developed and well-nourished. He is active and cooperative.   Nose: Nose normal.   Eyelid hypertrophy bilaterally  Mouth/Throat: Oropharynx is clear and moist. No oropharyngeal exudate.   Eyes: Conjunctivae and EOM are normal. Pupils are equal, round, and reactive to light. Right eye exhibits no discharge. Left eye exhibits no discharge. No scleral icterus.   Neck: Trachea normal, normal range of motion and full passive range of motion without pain. Neck supple. Normal carotid pulses and no JVD present. Carotid bruit is not present. No tracheal deviation present. No mass and no thyromegaly present. "   Cardiovascular: Normal rate, regular rhythm, S1 normal, S2 normal, normal heart sounds and intact distal pulses.  Exam reveals no gallop and no friction rub.  No murmur heard.  Pulses:       Carotid pulses are 2+ on the right side, and 2+ on the left side.       Radial pulses are 2+ on the right side, and 2+ on the left side.        Dorsalis pedis pulses are 2+ on the right side, and 2+ on the left side.   Pulmonary/Chest: Effort normal and breath sounds normal. No respiratory distress. He has no wheezes. He has no rales.   Lymphadenopathy:        Head (right side): No tonsillar adenopathy present.        Head (left side): No tonsillar adenopathy present.     He has no cervical adenopathy.   Neurological: He is alert and oriented to person, place, and time. He has normal strength. No cranial nerve deficit. Coordination normal.   Psychiatric: He has a normal mood and affect. His speech is normal and behavior is normal. Judgment and thought content normal.   Right shoulder: some crepitus with ROM. Limited AROM (decreased abduction); pain with abduction > 90 degrees        Results for orders placed or performed in visit on 11/26/18   Comprehensive metabolic panel   Result Value Ref Range    Sodium 142 136 - 145 mmol/L    Potassium 4.0 3.5 - 5.1 mmol/L    Chloride 108 95 - 110 mmol/L    CO2 27 23 - 29 mmol/L    Glucose 118 (H) 70 - 110 mg/dL    BUN, Bld 17 8 - 23 mg/dL    Creatinine 1.0 0.5 - 1.4 mg/dL    Calcium 9.4 8.7 - 10.5 mg/dL    Total Protein 7.6 6.0 - 8.4 g/dL    Albumin 4.0 3.5 - 5.2 g/dL    Total Bilirubin 1.0 0.1 - 1.0 mg/dL    Alkaline Phosphatase 53 (L) 55 - 135 U/L    AST 35 10 - 40 U/L    ALT 54 (H) 10 - 44 U/L    Anion Gap 7 (L) 8 - 16 mmol/L    eGFR if African American >60.0 >60 mL/min/1.73 m^2    eGFR if non African American >60.0 >60 mL/min/1.73 m^2   Lipid panel   Result Value Ref Range    Cholesterol 176 120 - 199 mg/dL    Triglycerides 234 (H) 30 - 150 mg/dL    HDL 29 (L) 40 - 75 mg/dL    LDL  Cholesterol 100.2 63.0 - 159.0 mg/dL    Hdl/Cholesterol Ratio 16.5 (L) 20.0 - 50.0 %    Total Cholesterol/HDL Ratio 6.1 (H) 2.0 - 5.0    Non-HDL Cholesterol 147 mg/dL   PSA, Screening   Result Value Ref Range    PSA, SCREEN 1.4 0.00 - 4.00 ng/mL     Assessment:       1. Chronic right shoulder pain    2. Injury of muscle or tendon of rotator cuff, right, sequela    3. Essential hypertension    4. Hypertriglyceridemia    5. Prostate cancer screening    6. Tinnitus of both ears        Plan:       Chronic right shoulder pain  -     X-ray Shoulder 2 or More Views Right; Future; Expected date: 05/28/2019  -     Ambulatory referral to Orthopedics    Injury of muscle or tendon of rotator cuff, right, sequela  -     X-ray Shoulder 2 or More Views Right; Future; Expected date: 05/28/2019  -     Ambulatory referral to Orthopedics    Essential hypertension  -     Comprehensive metabolic panel; Future; Expected date: 11/24/2019  -     Lipid panel; Future; Expected date: 11/24/2019    Hypertriglyceridemia    Prostate cancer screening  -     PSA, Screening; Future; Expected date: 11/24/2019    Tinnitus of both ears  -     ALPRAZolam (XANAX) 0.5 MG tablet; Take 1 tablet (0.5 mg total) by mouth nightly as needed (tinitus).  Dispense: 30 tablet; Refill: 5         Shoulder xray and ortho referral  Continue present meds  Counseled on regular exercise, maintenance of a healthy weight, balanced diet rich in fruits/vegetables and lean protein, and avoidance of unhealthy habits like smoking and excessive alcohol intake.  Continue low fat diet  F/u 6 months to refill xanax or PRN

## 2019-05-28 NOTE — PROGRESS NOTES
Chief Complaint   Patient presents with    Shoulder Pain     right shoulder pain       HPI:    This is a 73 y.o. who presents today complaining of right shoulder pain for 1 years after no known injury. Pain is aching. No numbness or tingling. No associated signs or symptoms.      Past Medical History:   Diagnosis Date    Ankylosing spondylitis     thoracic;     BPH (benign prostatic hypertrophy)     Cholelithiases     DDD (degenerative disc disease), lumbar     treated with FILOMENA with success    Fatty liver     HTN (hypertension)     Hypertriglyceridemia     Left kidney mass     followed by urology, Dr. De Guzman    Tinnitus of both ears     he consulted with ENT; He uses Xanax PRn    Wears glasses       Past Surgical History:   Procedure Laterality Date    KNEE SURGERY      right; 3 separate surgeries to repair patellar fracture    REPAIR-HERNIA-INGUINAL - Left Left 12/9/2016    Performed by [facility]q [facility] at [facility]    stapendectomy Bilateral 1985    bilateraly    WISDOM TOOTH EXTRACTION        Current Outpatient Medications on File Prior to Visit   Medication Sig Dispense Refill    ALPRAZolam (XANAX) 0.5 MG tablet Take 1 tablet (0.5 mg total) by mouth nightly as needed (tinitus). 30 tablet 5    amLODIPine (NORVASC) 10 MG tablet TAKE 1 TABLET (10 MG TOTAL) BY MOUTH ONCE DAILY. 90 tablet 1    atenolol (TENORMIN) 100 MG tablet TAKE 1 TABLET (100 MG TOTAL) BY MOUTH ONCE DAILY. 90 tablet 3    finasteride (PROSCAR) 5 mg tablet TAKE 1 TABLET (5 MG TOTAL) BY MOUTH ONCE DAILY. 90 tablet 3    varicella-zoster gE-AS01B, PF, (SHINGRIX, PF,) 50 mcg/0.5 mL injection Inject 0.5 mLs into the muscle once. for 1 dose 0.5 mL 1    [DISCONTINUED] ALPRAZolam (XANAX) 0.5 MG tablet Take 1 tablet (0.5 mg total) by mouth nightly as needed (tinitus). 30 tablet 5    [DISCONTINUED] naproxen (NAPROSYN) 500 MG tablet Take 1 tablet (500 mg total) by mouth 2 (two) times daily with meals. 20 tablet 0     No current  facility-administered medications on file prior to visit.       Review of patient's allergies indicates:   Allergen Reactions    Sulfa (sulfonamide antibiotics) Hives and Other (See Comments)     Decreased white blood count per pt      Family History not pertinent   Social History     Socioeconomic History    Marital status:      Spouse name: Not on file    Number of children: 3    Years of education: Not on file    Highest education level: Not on file   Occupational History    Occupation: retired [email or web address]   Social Needs    Financial resource strain: Not on file    Food insecurity:     Worry: Not on file     Inability: Not on file    Transportation needs:     Medical: Not on file     Non-medical: Not on file   Tobacco Use    Smoking status: Former Smoker     Types: Cigars, Cigarettes     Start date: 4/24/2014    Smokeless tobacco: Never Used    Tobacco comment: will smoke a cigar occasionally   Substance and Sexual Activity    Alcohol use: Yes     Comment: occasional    Drug use: No    Sexual activity: Not on file   Lifestyle    Physical activity:     Days per week: Not on file     Minutes per session: Not on file    Stress: Not on file   Relationships    Social connections:     Talks on phone: Not on file     Gets together: Not on file     Attends Church service: Not on file     Active member of club or organization: Not on file     Attends meetings of clubs or organizations: Not on file     Relationship status: Not on file   Other Topics Concern    Not on file   Social History Narrative    Hobbies: fish, hunt        Exercise: [employer] 3 times a week with treadmill             Review of Systems:   Constitutional:  Denies fever or chills    Eyes:  Denies change in visual acuity    HENT:  Denies nasal congestion or sore throat    Respiratory:  Denies cough or shortness of breath    Cardiovascular:  Denies chest pain or edema    GI:  Denies abdominal pain, nausea, vomiting, bloody stools  or diarrhea    :  Denies dysuria    Integument:  Denies rash    Neurologic:  Denies headache, focal weakness or sensory changes    Endocrine:  Denies polyuria or polydipsia    Lymphatic:  Denies swollen glands    Psychiatric:  Denies depression or anxiety       Physical Exam:    Constitutional:  Well developed, well nourished, no acute distress, non-toxic appearance    Integument:  Well hydrated, no rash    Lymphatic:  No lymphadenopathy noted    Neurologic:  Alert & oriented x 3, CN 2-12 normal, normal motor function, normal sensory function, no focal deficits noted    Psychiatric:  Speech and behavior appropriate      Bilateral Shoulder Exam    left Shoulder Exam   Shoulder exam performed same as contralateral side and is normal.    right Shoulder Exam   Tenderness   Shoulder tenderness location: diffusely about shoulder.    Range of Motion   Forward Flexion: abnormal   External Rotation: abnormal     Muscle Strength   Supraspinatus: 3/5     Tests   Hawkin's test: positive  Impingement: positive    Other   Erythema: absent  Sensation: normal  Pulse: present        X-rays were performed today, personally reviewed by me and findings discussed with the patient.   3 views of the right shoudler show diffuse degenerative changes     Complete tear of right rotator cuff  -     Ambulatory referral to Physical Therapy    Other orders  -     meloxicam (MOBIC) 15 MG tablet; Take 1 tablet (15 mg total) by mouth once daily.  Dispense: 30 tablet; Refill: 3          Using an aseptic technique, I injected 5 cc of lidocaine 1% without and 1 cc of kenalog 40mg into the right Shoulder. The patient tolerated this well. I will have them return to clinic in 2 months.

## 2019-05-28 NOTE — LETTER
June 4, 2019      Zion Yao MD  [address] [facility] [address] [place] [address]           [facility]  [address] [facility] [address] [place] [address]  Phone: [phone]          Patient: Evan Garcia   MR Number: [id number]   YOB: 1945   Date of Visit: 5/28/2019       Dear Dr. Zion Yao:    Thank you for referring Evan Garcia to me for evaluation. Attached you will find relevant portions of my assessment and plan of care.    If you have questions, please do not hesitate to call me. I look forward to following Evan Garcia along with you.    Sincerely,    Alo Fitzgerald MD    Enclosure  CC:  No Recipients    If you would like to receive this communication electronically, please contact [email or web address] or [phone] to request more information on [facility] access.    For providers and/or their staff who would like to refer a patient to [facility], please contact us through our one-stop-shop provider referral line, [facility] , at [phone].    If you feel you have received this communication in error or would no longer like to receive these types of communications, please e-mail [email or web address]

## 2019-05-28 NOTE — PROCEDURES
Large Joint Aspiration/Injection: R subacromial bursa  Date/Time: 5/28/2019 2:56 PM  Performed by: Alo Fitzgerald MD  Authorized by: Alo Fitzgerald MD     Consent Done?:  Yes (Verbal)  Indications:  Pain  Procedure site marked: Yes    Timeout: Prior to procedure the correct patient, procedure, and site was verified      Location:  Shoulder  Site:  R subacromial bursa  Prep: Patient was prepped and draped in usual sterile fashion    Ultrasonic Guidance for needle placement: No  Needle size:  22 G  Approach:  Anterior  Medications:  40 mg triamcinolone acetonide 40 mg/mL; 40 mg triamcinolone acetonide 40 mg/mL  Patient tolerance:  Patient tolerated the procedure well with no immediate complications

## 2019-06-04 RX ORDER — TRIAMCINOLONE ACETONIDE 40 MG/ML
40 INJECTION, SUSPENSION INTRA-ARTICULAR; INTRAMUSCULAR
Status: DISCONTINUED | OUTPATIENT
Start: 2019-05-28 | End: 2019-06-04 | Stop reason: HOSPADM

## 2019-06-07 ENCOUNTER — CLINICAL SUPPORT (OUTPATIENT)
Dept: REHABILITATION | Facility: HOSPITAL | Age: 74
End: 2019-06-07
Attending: ORTHOPAEDIC SURGERY
Payer: MEDICARE

## 2019-06-07 DIAGNOSIS — R53.1 WEAKNESS: ICD-10-CM

## 2019-06-07 DIAGNOSIS — M25.511 CHRONIC RIGHT SHOULDER PAIN: ICD-10-CM

## 2019-06-07 DIAGNOSIS — G89.29 CHRONIC RIGHT SHOULDER PAIN: ICD-10-CM

## 2019-06-07 PROCEDURE — 97110 THERAPEUTIC EXERCISES: CPT | Mod: PO | Performed by: PHYSICAL THERAPIST

## 2019-06-07 PROCEDURE — 97161 PT EVAL LOW COMPLEX 20 MIN: CPT | Mod: PO | Performed by: PHYSICAL THERAPIST

## 2019-06-07 NOTE — PATIENT INSTRUCTIONS
Scapular Retraction: Bilateral        Facing anchor, pull arms back, bringing shoulder blades together.  Repeat ____ times per set. Do ____ sets per session. Do ____ sessions per day.     https://[employer]/176     Copyright © [employer]. All rights reserved.   Strengthening: Resisted External Rotation        Hold tubing in right hand, elbow at side and forearm across body. Rotate forearm out.  Repeat ____ times per set. Do ____ sets per session. Do ____ sessions per day.     https://[employer]/828     Copyright © [employer]. All rights reserved.   Strengthening: Resisted Internal Rotation        Hold tubing in left hand, elbow at side and forearm out. Rotate forearm in across body.  Repeat ____ times per set. Do ____ sets per session. Do ____ sessions per day.     https://[employer]/830     Copyright © [employer]. All rights reserved.   Strengthening: Scaption - with External Rotation        Holding ____ pound weight, raise right arm diagonally from hip to above head. Keep elbow straight, thumb up.  Repeat ____ times per set. Do ____ sets per session. Do ____ sessions per day.     https://[employer]/892     Copyright © [employer]. All rights reserved.

## 2019-06-07 NOTE — PLAN OF CARE
[facility]  Physical Therapy Initial Evaluation    Name: Evan Garcia  Clinic Number: [id number]    Therapy Diagnosis:   Encounter Diagnoses   Name Primary?    Chronic right shoulder pain     Weakness      Physician: Alo Fitzgerald MD    Physician Orders: PT Eval and Treat   Medical Diagnosis from Referral: Complete tear of right rotator cuff  Evaluation Date: 6/7/2019  Authorization Period Expiration: 08/07/2019  Plan of Care Expiration: 07/19/2019  Visit # / Visits authorized: 1/ 12    Time In: 10:03 AM  Time Out: 10:52 AM  Total Billable Time: 49 minutes    Precautions: Standard    Subjective   Date of onset: 2 months ago   History of current condition - Evan reports: Is a right hand dominant male with a year long history of right shoulder pain that has worsened over the last couple of months. During this time, he has noticed a decreased ability to use his right arm. He states that his arm is weak and is painful to use. He has difficulty performing overhead activities that require lifting any weight and difficulty sleeping night. He had an injection about 1 week ago and states that it has really helped with the pain and improved the mobility of the shoulder some.     _       _    Past Medical History:   Diagnosis Date    Ankylosing spondylitis     thoracic;     BPH (benign prostatic hypertrophy)     Cholelithiases     DDD (degenerative disc disease), lumbar     treated with FILOMENA with success    Fatty liver     HTN (hypertension)     Hypertriglyceridemia     Left kidney mass     followed by urology, Dr. De Guzman    Tinnitus of both ears     he consulted with ENT; He uses Xanax PRn    Wears glasses      Evan Garcia  has a past surgical history that includes Knee surgery; stapendectomy (Bilateral, 1985); and [place] tooth extraction.    Evan has a current medication list which includes the following prescription(s): alprazolam, amlodipine, atenolol, finasteride, and  meloxicam.    Review of patient's allergies indicates:   Allergen Reactions    Sulfa (sulfonamide antibiotics) Hives and Other (See Comments)     Decreased white blood count per pt        Imaging: x-ray-->Humeral head normally positioned with the glenoid cavity.  Mild glenohumeral degenerative osteoarthrosis with mild joint space narrowing and small osteophyte formation.    AC joint is intact with mild degenerative osteoarthrosis.  Mild bone demineralization.    Prior Therapy: None   Social History:  lives alone  Occupation: retired from oil refinery   Prior Level of Function: Was able to perform overhead activities, but it was painful  Current Level of Function: Very limited with performance of overhead activities, difficulty sleeping     Pain:  Current 0/10, worst 7/10, best 0/10   Location: right shoulder   Description: Aching and Dull  Aggravating Factors: Night Time and Lifting  Easing Factors: not using arm         Pts goals: To improve symptoms and increase mobility to allow him to avoid surgery     Objective   Mental status: oriented x3  Posture/ Alignment: Protruded Head, Protracted Scapula    ROM:     AROM Right Left Comment   Shoulder Flexion: 138 degrees 150 degrees    Shoulder IR:  70 degrees (L1) 70 degrees (T9)    Shoulder ER:  74 degrees (T2) 78 degrees (T5)    Shoulder Abduction: 122 degrees 152 degrees    PROM Right Left Comment   Shoulder Flexion: 145 degrees NT degrees    Shoulder Abduction: 130 degrees NT degrees    *pain    Strength:      Right Left Comment   Shoulder flexion: 3+/5 5/5    Shoulder Abduction: 3+/5 5/5    Shoulder ER: 3+/5 5/5    Shoulder IR: 3+/5 5/5    Lower Trap: 3+/5 4/5    Middle Trap: 5/5 5/5      Special Tests:     - Lift-off: right negative  - ER Lag: right negative  - Drop Arm: right negative  - Full/Empty Can: right negative  - dong: right positive       Palpation:  No TTP elicited     Pt/family was provided educational information, including: role of PT, goals for  PT, scheduling - pt verbalized understanding. Discussed insurance plan with pt.           CMS Impairment/Limitation/Restriction for FOTO Shoulder Survey    Therapist reviewed FOTO scores for Evan Garcia on 6/7/2019.   FOTO documents entered into [employer] - see Media section.    Limitation Score: 50%  Category: Carrying    Current : CK = at least 40% but < 60% impaired, limited or restricted  Goal: CI = at least 1% but < 20% impaired, limited or restricted             TREATMENT   Treatment Time In: 10:42 AM  Treatment Time Out: 10:52 AM  Total Treatment time separate from Evaluation: 10 minutes    Evan received therapeutic exercises to develop strength for 10 minutes including:  HEP setup and instruction; patient exercises demonstrated with correct technique    Home Exercises and Patient Education Provided    Education provided:   - HEP   - Pathophysiology of condition   - POC    Written Home Exercises Provided: yes.  Exercises were reviewed and Evan was able to demonstrate them prior to the end of the session.  Evan demonstrated good  understanding of the education provided.     See EMR under Patient Instructions for exercises provided 6/7/2019.    Assessment   Pt presents with Complete tear of right rotator cuff. He is right hand dominant and has limited right shoulder ROM, weakness, pain and limited functional use of the right shoulder. He is unable to perform activities that require lifting any substantial weight overhead. He  Will benefit from physical therapy to assist in reducing impairments and improving function.     Pt prognosis is Good.   Pt will benefit from skilled outpatient Physical Therapy to address the deficits stated above and in the chart below, provide pt/family education, and to maximize pt's level of independence.     Plan of care discussed with patient: Yes  Pt's spiritual, cultural and educational needs considered and patient is agreeable to the plan of care and goals as stated below:      Anticipated Barriers for therapy: None at this time     Medical Necessity is demonstrated by the following  History  Co-morbidities and personal factors that may impact the plan of care Co-morbidities:   advanced age    Personal Factors:   no deficits     low   Examination  Body Structures and Functions, activity limitations and participation restrictions that may impact the plan of care Body Regions:   upper extremities    Body Systems:    gross symmetry  ROM  strength    Participation Restrictions:       Activity limitations:   Learning and applying knowledge  no deficits    General Tasks and Commands  no deficits    Communication  no deficits    Mobility  lifting and carrying objects    Self care  no deficits    Domestic Life  doing house work (cleaning house, washing dishes, laundry)    Interactions/Relationships  no deficits    Life Areas  no deficits    Community and Social Life  recreation and leisure         moderate   Clinical Presentation stable and uncomplicated low   Decision Making/ Complexity Score: low     Goals:  Short Term Goals: 3 weeks   1) Pt. Will be I with established HEP   2) Pain will be 6/10 at its worst to improve ease of ADL activities   3) Right shoulder flexion ROM will improve by 10% to improve ease of overhead reaching    Long Term Goals: 6 weeks   1) Strength will improve by 1/2 grade to allow him to lift 5 pound object onto an overhead shelf with 3/10 pain or less  2) Pain will be no worse then 3/10 during all ADL activities  3)  Pt. Will have sufficient symptom reduction to allow him to return to fishing           Plan   Plan of care Certification: 6/7/2019 to 07/19/2019.    Outpatient Physical Therapy 2 times weekly for 6 weeks to include the following interventions: Manual Therapy, Moist Heat/ Ice, Neuromuscular Re-ed, Patient Education, Therapeutic Activites, Therapeutic Exercise and dry needling.     Rene Fleming, PT

## 2019-06-12 ENCOUNTER — CLINICAL SUPPORT (OUTPATIENT)
Dept: REHABILITATION | Facility: HOSPITAL | Age: 74
End: 2019-06-12
Attending: ORTHOPAEDIC SURGERY
Payer: MEDICARE

## 2019-06-12 DIAGNOSIS — G89.29 CHRONIC RIGHT SHOULDER PAIN: ICD-10-CM

## 2019-06-12 DIAGNOSIS — R53.1 WEAKNESS: ICD-10-CM

## 2019-06-12 DIAGNOSIS — M25.511 CHRONIC RIGHT SHOULDER PAIN: ICD-10-CM

## 2019-06-12 PROCEDURE — 97110 THERAPEUTIC EXERCISES: CPT | Mod: PO

## 2019-06-12 PROCEDURE — 97140 MANUAL THERAPY 1/> REGIONS: CPT | Mod: PO

## 2019-06-12 NOTE — PROGRESS NOTES
Physical Therapy Daily Treatment Note     Name: Evan Garcia  Clinic Number: [id number]    Therapy Diagnosis:   Encounter Diagnoses   Name Primary?    Chronic right shoulder pain     Weakness      Physician: Alo Fitzgerald MD  Physician Orders: PT Eval and Treat   Medical Diagnosis from Referral: Complete tear of right rotator cuff  Evaluation Date: 6/7/2019  Authorization Period Expiration: 08/07/2019  Plan of Care Expiration: 07/19/2019  Visit # / Visits authorized: 1/ 12     Time In: 10:00 AM  Time Out: 11:10 AM  Total Billable Time: 60 minutes     Precautions: Standard      Subjective     Pt reports: that his shld is feeling better and not waking him up at night as much since receiving injection.  He was compliant with home exercise program.  Response to previous treatment: no adverse effects  Functional change: too soon to assess    Pain: 0/10  Location: right shoulder      Objective     Evan received therapeutic exercises to develop strength, endurance, ROM, flexibility, posture and core stabilization for 45 minutes including:  UBE 3/3 for ROM and endurance  Pulleys flex and ABD 3 min each  IR/ER red t-band 2x10  Horiz Row red t-band 2x10  B shld ext red t-band 2x10  Ball on wall green ball flex/ext, Horiz ABD/ADD, CW/CCW 20x each  SL ER 2x10  SL ABD 2x10  Prone row with scap set 2x 10  Prone ext 2x10  Prone HAB with scap set 2x10    Evan received the following manual therapy techniques: Joint mobilizations were applied to the: R shld for 10 minutes, including:  grade I-IV shld/GH mobs and PROM in all avail planes    Evan received cold pack for 10 minutes to to decrease circulation, pain, and swelling.        Home Exercises Provided and Patient Education Provided     Education provided:   - continue HEP    Written Home Exercises Provided: Patient instructed to cont prior HEP.  Exercises were reviewed and Evan was able to demonstrate them prior to the end of the session.  Evan demonstrated good   understanding of the education provided.     See EMR under Patient Instructions for exercises provided prior visit.    Assessment     Pt with good hugh to ther ex, good ROM with AROM , better with PROM limited by minor pn. Pt with good effort with all activities, Hugh addition of scap stab exs w/o complaint.   Evan is progressing well towards his goals.   Pt prognosis is Good.     Pt will continue to benefit from skilled outpatient physical therapy to address the deficits listed in the problem list box on initial evaluation, provide pt/family education and to maximize pt's level of independence in the home and community environment.     Pt's spiritual, cultural and educational needs considered and pt agreeable to plan of care and goals.    Anticipated barriers to physical therapy: none    Goals:   Short Term Goals: 3 weeks   1) Pt. Will be I with established HEP   2) Pain will be 6/10 at its worst to improve ease of ADL activities   3) Right shoulder flexion ROM will improve by 10% to improve ease of overhead reaching     Long Term Goals: 6 weeks   1) Strength will improve by 1/2 grade to allow him to lift 5 pound object onto an overhead shelf with 3/10 pain or less  2) Pain will be no worse then 3/10 during all ADL activities  3)  Pt. Will have sufficient symptom reduction to allow him to return to fishing       Plan     Outpatient Physical Therapy 2 times weekly for 6 weeks to include the following interventions: Manual Therapy, Moist Heat/ Ice, Neuromuscular Re-ed, Patient Education, Therapeutic Activites, Therapeutic Exercise and dry needling       Karl Vu PTA

## 2019-06-14 ENCOUNTER — CLINICAL SUPPORT (OUTPATIENT)
Dept: REHABILITATION | Facility: HOSPITAL | Age: 74
End: 2019-06-14
Attending: ORTHOPAEDIC SURGERY
Payer: MEDICARE

## 2019-06-14 DIAGNOSIS — G89.29 CHRONIC RIGHT SHOULDER PAIN: ICD-10-CM

## 2019-06-14 DIAGNOSIS — R53.1 WEAKNESS: ICD-10-CM

## 2019-06-14 DIAGNOSIS — M25.511 CHRONIC RIGHT SHOULDER PAIN: ICD-10-CM

## 2019-06-14 PROCEDURE — 97140 MANUAL THERAPY 1/> REGIONS: CPT | Mod: PO | Performed by: PHYSICAL THERAPIST

## 2019-06-14 PROCEDURE — 97110 THERAPEUTIC EXERCISES: CPT | Mod: PO | Performed by: PHYSICAL THERAPIST

## 2019-06-14 NOTE — PROGRESS NOTES
Physical Therapy Daily Treatment Note     Name: Evan Garcia  Clinic Number: [id number]    Therapy Diagnosis:   Encounter Diagnoses   Name Primary?    Chronic right shoulder pain     Weakness      Physician: Alo Fitzgerald MD  Physician Orders: PT Eval and Treat   Medical Diagnosis from Referral: Complete tear of right rotator cuff  Evaluation Date: 6/7/2019  Authorization Period Expiration: 08/07/2019  Plan of Care Expiration: 07/19/2019  Visit # / Visits authorized: 3/ 12     Time In: 10:00 AM  Time Out: 10:42 AM  Total Billable Time: 30 minutes     Precautions: Standard      Subjective     Pt reports: that his shoulder has been feeling better. I have been able to sleep better.   He was compliant with home exercise program.  Response to previous treatment: soreness following exercise   Functional change: Improved sleeping ability     Pain: 2/10  Location: right shoulder      Objective     Evan received therapeutic exercises to develop strength, endurance, ROM, flexibility, posture and core stabilization for 34 minutes including:  UBE 3/3 for ROM and endurance  Pulleys flex and ABD 3 min each  IR/ER red t-band 2x10  Horiz Row red t-band 2x10  B shld ext red t-band 2x10  Ball on wall green ball flex/ext, Horiz ABD/ADD, CW/CCW 20x each  SL ER 2x10 2#  SL ABD 2x10 2#  Prone row with scap set 2x 10  Prone ext 2x10 2#  Prone HAB with scap set 2x10    Evan received the following manual therapy techniques: Joint mobilizations were applied to the: R shld for 8 minutes, including:  grade I-IV shld/GH mobs and PROM in all avail planes    Evan received cold pack for  to to decrease circulation, pain, and swelling.        Home Exercises Provided and Patient Education Provided     Education provided:   - continue HEP    Written Home Exercises Provided: Patient instructed to cont prior HEP.  Exercises were reviewed and Evan was able to demonstrate them prior to the end of the session.  Evan demonstrated good  understanding  of the education provided.     See EMR under Patient Instructions for exercises provided prior visit.    Assessment     Pain levels improved and positive response to early strengthening exercise. He will benefit from continued performance of strengthening activities to maximize function and minimize pain  Evan is progressing well towards his goals.   Pt prognosis is Good.     Pt will continue to benefit from skilled outpatient physical therapy to address the deficits listed in the problem list box on initial evaluation, provide pt/family education and to maximize pt's level of independence in the home and community environment.     Pt's spiritual, cultural and educational needs considered and pt agreeable to plan of care and goals.    Anticipated barriers to physical therapy: none    Goals:   Short Term Goals: 3 weeks   1) Pt. Will be I with established HEP   2) Pain will be 6/10 at its worst to improve ease of ADL activities   3) Right shoulder flexion ROM will improve by 10% to improve ease of overhead reaching     Long Term Goals: 6 weeks   1) Strength will improve by 1/2 grade to allow him to lift 5 pound object onto an overhead shelf with 3/10 pain or less  2) Pain will be no worse then 3/10 during all ADL activities  3)  Pt. Will have sufficient symptom reduction to allow him to return to fishing       Plan     Outpatient Physical Therapy 2 times weekly for 6 weeks to include the following interventions: Manual Therapy, Moist Heat/ Ice, Neuromuscular Re-ed, Patient Education, Therapeutic Activites, Therapeutic Exercise and dry needling       Rene Fleming, PT

## 2019-06-19 ENCOUNTER — CLINICAL SUPPORT (OUTPATIENT)
Dept: REHABILITATION | Facility: HOSPITAL | Age: 74
End: 2019-06-19
Attending: ORTHOPAEDIC SURGERY
Payer: MEDICARE

## 2019-06-19 DIAGNOSIS — M25.511 CHRONIC RIGHT SHOULDER PAIN: ICD-10-CM

## 2019-06-19 DIAGNOSIS — R53.1 WEAKNESS: ICD-10-CM

## 2019-06-19 DIAGNOSIS — G89.29 CHRONIC RIGHT SHOULDER PAIN: ICD-10-CM

## 2019-06-19 PROCEDURE — 97140 MANUAL THERAPY 1/> REGIONS: CPT | Mod: PO

## 2019-06-19 PROCEDURE — 97110 THERAPEUTIC EXERCISES: CPT | Mod: PO

## 2019-06-19 NOTE — PROGRESS NOTES
"  Physical Therapy Daily Treatment Note     Name: Evan Garcia  Clinic Number: [id number]    Therapy Diagnosis:   Encounter Diagnoses   Name Primary?    Chronic right shoulder pain     Weakness      Physician: Alo Fitzgerald MD  Physician Orders: PT Eval and Treat   Medical Diagnosis from Referral: Complete tear of right rotator cuff  Evaluation Date: 6/7/2019  Authorization Period Expiration: 08/07/2019  Plan of Care Expiration: 07/19/2019  Visit # / Visits authorized: 4/ 12     Time In: 10:00 AM  Time Out: 10:54 AM  Total Billable Time: 54 minutes     Precautions: Standard      Subjective     Pt reports: "The shoulder has not been keeping me up any more. I have been doing my exercises, but still cannot lift anything up with my shoulder ."  He was compliant with home exercise program.  Response to previous treatment: soreness following exercise   Functional change: Improved sleeping ability     Pain: 1/10  Location: right shoulder      Objective     Evan received therapeutic exercises to develop strength, endurance, ROM, flexibility, posture and core stabilization for 44 minutes including:  UBE 3/3 for ROM and endurance  Pulleys flex and ABD 3 min each  IR/ER green t-band 2x10  Horiz Row green t-band 2x10  B shld ext green t-band 2x10  Ball on wall green ball flex/ext, Horiz ABD/ADD, CW/CCW 20x each  Pec stretch on half foam roll x 3 minutes  Standing scaption 1#  2 x 10  Supine T's and X's 2 x 10 GTB  SL ER 2x10 2#  SL ABD 2x10 2#  Prone row with scap set 2x 10  Prone ext 2x10 2#  Prone HAB with scap set 2x10  Rhythmic stabilizations 2 x 30s at 90 degrees flexion / 2 x 30s at 90 ABD/90ER    Evan received the following manual therapy techniques: Joint mobilizations were applied to the: R shld for 10 minutes, including:  grade I-IV shld/GH mobs and PROM in all avail planes      Home Exercises Provided and Patient Education Provided     Education provided:   - continue HEP    Written Home Exercises Provided: " Patient instructed to cont prior HEP.  Exercises were reviewed and Evan was able to demonstrate them prior to the end of the session.  Evan demonstrated good  understanding of the education provided.     See EMR under Patient Instructions for exercises provided prior visit.    Assessment     Progressed standing shoulder exercises from red to green theraband, which Evan tolerated very well. Issued green theraband for his HEP. He tolerated addition of pec stretch and supine scapular exercises very well. Mild pain in posterior shoulder with SL ER and ABD. Will continue to progress his exercises as tolerated.     Evan is progressing well towards his goals.   Pt prognosis is Good.     Pt will continue to benefit from skilled outpatient physical therapy to address the deficits listed in the problem list box on initial evaluation, provide pt/family education and to maximize pt's level of independence in the home and community environment.     Pt's spiritual, cultural and educational needs considered and pt agreeable to plan of care and goals.    Anticipated barriers to physical therapy: none    Goals:   Short Term Goals: 3 weeks   1) Pt. Will be I with established HEP   2) Pain will be 6/10 at its worst to improve ease of ADL activities   3) Right shoulder flexion ROM will improve by 10% to improve ease of overhead reaching     Long Term Goals: 6 weeks   1) Strength will improve by 1/2 grade to allow him to lift 5 pound object onto an overhead shelf with 3/10 pain or less  2) Pain will be no worse then 3/10 during all ADL activities  3)  Pt. Will have sufficient symptom reduction to allow him to return to fishing       Plan     Outpatient Physical Therapy 2 times weekly for 6 weeks to include the following interventions: Manual Therapy, Moist Heat/ Ice, Neuromuscular Re-ed, Patient Education, Therapeutic Activites, Therapeutic Exercise and dry needling       Jules Cummings, PT

## 2019-06-21 ENCOUNTER — CLINICAL SUPPORT (OUTPATIENT)
Dept: REHABILITATION | Facility: HOSPITAL | Age: 74
End: 2019-06-21
Attending: ORTHOPAEDIC SURGERY
Payer: MEDICARE

## 2019-06-21 DIAGNOSIS — G89.29 CHRONIC RIGHT SHOULDER PAIN: ICD-10-CM

## 2019-06-21 DIAGNOSIS — R53.1 WEAKNESS: ICD-10-CM

## 2019-06-21 DIAGNOSIS — M25.511 CHRONIC RIGHT SHOULDER PAIN: ICD-10-CM

## 2019-06-21 PROCEDURE — 97110 THERAPEUTIC EXERCISES: CPT | Mod: PO | Performed by: PHYSICAL THERAPIST

## 2019-06-21 NOTE — PROGRESS NOTES
Physical Therapy Daily Treatment Note     Name: Evan Garcia  Clinic Number: [id number]    Therapy Diagnosis:   Encounter Diagnoses   Name Primary?    Chronic right shoulder pain     Weakness      Physician: Alo Fitzgerald MD  Physician Orders: PT Eval and Treat   Medical Diagnosis from Referral: Complete tear of right rotator cuff  Evaluation Date: 6/7/2019  Authorization Period Expiration: 08/07/2019  Plan of Care Expiration: 07/19/2019  Visit # / Visits authorized: 5/ 12     Time In: 10:00 AM  Time Out: 10:50 AM  Total Billable Time: 30 minutes     Precautions: Standard      Subjective     Pt reports: I am sleeping better and am able to use my arm better, but its still weak  He was compliant with home exercise program.  Response to previous treatment: soreness following exercise   Functional change: Improved sleeping ability     Pain: 1/10  Location: right shoulder      Objective     Evan received therapeutic exercises to develop strength, endurance, ROM, flexibility, posture and core stabilization for 50 minutes including:  UBE 3/3 for ROM and endurance  Pulleys flex and ABD 3 min each  IR/ER blue t-band 2x10  Horiz Row blue t-band 2x10  B shld ext blue t-band 2x10  Ball on wall green ball flex/ext, Horiz ABD/ADD, CW/CCW 20x each  Pec stretch on half foam roll x 3 minutes  Standing scaption 1#  2 x 10  Supine T's and X's 2 x 10 GTB  SL ER 2x10 2#  SL ABD 2x10 2#  Prone row with scap set 2x 10  Prone ext 2x10 2#  Prone HAB with scap set 2x10  Rhythmic stabilizations 2 x 30s at 90 degrees flexion / 2 x 30s at 90 ABD/90ER, 0 degrees ER     Evan received the following manual therapy techniques: Joint mobilizations were applied to the: R shld for  minutes, including:  grade I-IV shld/GH mobs and PROM in all avail planes      Home Exercises Provided and Patient Education Provided     Education provided:   - continue HEP    Written Home Exercises Provided: Patient instructed to cont prior HEP.  Exercises were  reviewed and Evan was able to demonstrate them prior to the end of the session.  Evan demonstrated good  understanding of the education provided.     See EMR under Patient Instructions for exercises provided prior visit.    Assessment     Pain levels greatly reduced. Functional use of right shoulder improved, but still somewhat limited. He will benefit from continued strengthening to maximize strength and overall function.   Evan is progressing well towards his goals.   Pt prognosis is Good.     Pt will continue to benefit from skilled outpatient physical therapy to address the deficits listed in the problem list box on initial evaluation, provide pt/family education and to maximize pt's level of independence in the home and community environment.     Pt's spiritual, cultural and educational needs considered and pt agreeable to plan of care and goals.    Anticipated barriers to physical therapy: none    Goals:   Short Term Goals: 3 weeks   1) Pt. Will be I with established HEP   2) Pain will be 6/10 at its worst to improve ease of ADL activities   3) Right shoulder flexion ROM will improve by 10% to improve ease of overhead reaching     Long Term Goals: 6 weeks   1) Strength will improve by 1/2 grade to allow him to lift 5 pound object onto an overhead shelf with 3/10 pain or less  2) Pain will be no worse then 3/10 during all ADL activities  3)  Pt. Will have sufficient symptom reduction to allow him to return to fishing       Plan     Outpatient Physical Therapy 2 times weekly for 6 weeks to include the following interventions: Manual Therapy, Moist Heat/ Ice, Neuromuscular Re-ed, Patient Education, Therapeutic Activites, Therapeutic Exercise and dry needling       Rene Fleming, PT

## 2019-06-26 ENCOUNTER — CLINICAL SUPPORT (OUTPATIENT)
Dept: REHABILITATION | Facility: HOSPITAL | Age: 74
End: 2019-06-26
Payer: MEDICARE

## 2019-06-26 DIAGNOSIS — G89.29 CHRONIC RIGHT SHOULDER PAIN: ICD-10-CM

## 2019-06-26 DIAGNOSIS — R53.1 WEAKNESS: ICD-10-CM

## 2019-06-26 DIAGNOSIS — M25.511 CHRONIC RIGHT SHOULDER PAIN: ICD-10-CM

## 2019-06-26 PROCEDURE — 97110 THERAPEUTIC EXERCISES: CPT | Mod: PO

## 2019-06-26 PROCEDURE — 97140 MANUAL THERAPY 1/> REGIONS: CPT | Mod: PO

## 2019-06-26 NOTE — PROGRESS NOTES
Physical Therapy Daily Treatment Note     Name: Evan Garcia  Clinic Number: [id number]    Therapy Diagnosis:   Encounter Diagnoses   Name Primary?    Chronic right shoulder pain     Weakness      Physician: Alo Fitzgerald MD  Physician Orders: PT Eval and Treat   Medical Diagnosis from Referral: Complete tear of right rotator cuff  Evaluation Date: 6/7/2019  Authorization Period Expiration: 08/07/2019  Plan of Care Expiration: 07/19/2019  Visit # / Visits authorized: 6/ 12     Time In: 11:00 AM  Time Out: 12:00 AM  Total Billable Time: 60 minutes     Precautions: Standard      Subjective     Pt reports: that he is w/o pn and is sleeping better   He was compliant with home exercise program.  Response to previous treatment: soreness following exercise   Functional change: Improved sleeping ability     Pain: 0/10  Location: right shoulder      Objective     Evan received therapeutic exercises to develop strength, endurance, ROM, flexibility, posture and core stabilization for 50 minutes including:  UBE 3/3 for ROM and endurance  Pulleys flex and ABD 3 min each  IR/ER blue t-band 2x10  Horiz Row blue t-band 2x10  B shld ext blue t-band 2x10  Ball on wall green ball flex/ext, Horiz ABD/ADD, CW/CCW 20x each  Pec stretch on half foam roll x 3 minutes  Standing scaption 2#  2 x 10  Supine T's and X's 2 x 10 GTB  SL ER 2x10 2#  SL ABD 2x10 2#  Prone row with scap set 2x 10  Prone ext 2x10 2#  Prone HAB with scap set 2x10  Rhythmic stabilizations 2 x 30s at 90 degrees flexion / 2 x 30s at 90 ABD/90ER, 0 degrees ER     Evan received the following manual therapy techniques: Joint mobilizations were applied to the: R shld for 10  minutes, including:  grade I-IV shld/GH mobs and PROM in all avail planes      Home Exercises Provided and Patient Education Provided     Education provided:   - continue HEP    Written Home Exercises Provided: Patient instructed to cont prior HEP.  Exercises were reviewed and Evan was able to  demonstrate them prior to the end of the session.  Evan demonstrated good  understanding of the education provided.     See EMR under Patient Instructions for exercises provided prior visit.    Assessment      Functional use of right shoulder improved, but still somewhat limited. Strength is improving evidenced by today's progression of resistance with scaption. He will benefit from continued strengthening to maximize strength and overall function.   Evan is progressing well towards his goals.   Pt prognosis is Good.     Pt will continue to benefit from skilled outpatient physical therapy to address the deficits listed in the problem list box on initial evaluation, provide pt/family education and to maximize pt's level of independence in the home and community environment.     Pt's spiritual, cultural and educational needs considered and pt agreeable to plan of care and goals.    Anticipated barriers to physical therapy: none    Goals:   Short Term Goals: 3 weeks   1) Pt. Will be I with established HEP   2) Pain will be 6/10 at its worst to improve ease of ADL activities   3) Right shoulder flexion ROM will improve by 10% to improve ease of overhead reaching     Long Term Goals: 6 weeks   1) Strength will improve by 1/2 grade to allow him to lift 5 pound object onto an overhead shelf with 3/10 pain or less  2) Pain will be no worse then 3/10 during all ADL activities  3)  Pt. Will have sufficient symptom reduction to allow him to return to fishing       Plan     Outpatient Physical Therapy 2 times weekly for 6 weeks to include the following interventions: Manual Therapy, Moist Heat/ Ice, Neuromuscular Re-ed, Patient Education, Therapeutic Activites, Therapeutic Exercise and dry needling       Karl Vu PTA

## 2019-07-03 ENCOUNTER — CLINICAL SUPPORT (OUTPATIENT)
Dept: REHABILITATION | Facility: HOSPITAL | Age: 74
End: 2019-07-03
Payer: MEDICARE

## 2019-07-03 DIAGNOSIS — G89.29 CHRONIC RIGHT SHOULDER PAIN: ICD-10-CM

## 2019-07-03 DIAGNOSIS — M25.511 CHRONIC RIGHT SHOULDER PAIN: ICD-10-CM

## 2019-07-03 DIAGNOSIS — R53.1 WEAKNESS: ICD-10-CM

## 2019-07-03 PROCEDURE — 97140 MANUAL THERAPY 1/> REGIONS: CPT | Mod: PO

## 2019-07-03 PROCEDURE — 97110 THERAPEUTIC EXERCISES: CPT | Mod: PO

## 2019-07-03 NOTE — PROGRESS NOTES
Physical Therapy Daily Treatment Note     Name: Evan Garcia  Clinic Number: [id number]    Therapy Diagnosis:   Encounter Diagnoses   Name Primary?    Chronic right shoulder pain     Weakness      Physician: Alo Fitzgerald MD  Physician Orders: PT Eval and Treat   Medical Diagnosis from Referral: Complete tear of right rotator cuff  Evaluation Date: 6/7/2019  Authorization Period Expiration: 08/07/2019  Plan of Care Expiration: 07/19/2019  Visit # / Visits authorized: 7/ 12     Time In: 10:00 AM  Time Out: 11:00 AM  Total Billable Time: 60 minutes     Precautions: Standard      Subjective     Pt reports: that he is w/o pn and feels he has improved mobility and stretngth   He was compliant with home exercise program.  Response to previous treatment: soreness following exercise   Functional change: Improved sleeping ability     Pain: 0/10  Location: right shoulder      Objective     Evan received therapeutic exercises to develop strength, endurance, ROM, flexibility, posture and core stabilization for 50 minutes including:  UBE 3/3 for ROM and endurance  Pulleys flex and ABD 3 min each  IR/ER blue t-band 2x10  Horiz Row blue t-band 2x10  B shld ext blue t-band 2x10  Ball on wall red ball flex/ext, Horiz ABD/ADD, CW/CCW 20x each  Pec stretch on half foam roll x 3 minutes  Standing scaption 2#  2 x 10  Supine T's and X's 2 x 10 GTB  SL ER 2x10 2#  SL ABD 2x10 2#  Prone row with scap set 2x 10  Prone ext 2x10 2#  Prone HAB with scap set 2x10  Rhythmic stabilizations 2 x 30s at 90 degrees flexion / 2 x 30s at 90 ABD/90ER, 0 degrees ER     Evan received the following manual therapy techniques: Joint mobilizations were applied to the: R shld for 10  minutes, including:  grade I-IV shld/GH mobs and PROM in all avail planes      Home Exercises Provided and Patient Education Provided     Education provided:   - continue HEP    Written Home Exercises Provided: Patient instructed to cont prior HEP.  Exercises were reviewed  and Evan was able to demonstrate them prior to the end of the session.  Evan demonstrated good  understanding of the education provided.     See EMR under Patient Instructions for exercises provided prior visit.    Assessment      Functional use of right shoulder improved, but still somewhat limited in strength and ROM. Strength is improving evidenced by today's progression of resistance with scapular ex with ball on wall. He will benefit from continued strengthening to maximize strength and overall function.   Evan is progressing well towards his goals.   Pt prognosis is Good.     Pt will continue to benefit from skilled outpatient physical therapy to address the deficits listed in the problem list box on initial evaluation, provide pt/family education and to maximize pt's level of independence in the home and community environment.     Pt's spiritual, cultural and educational needs considered and pt agreeable to plan of care and goals.    Anticipated barriers to physical therapy: none    Goals:   Short Term Goals: 3 weeks   1) Pt. Will be I with established HEP   2) Pain will be 6/10 at its worst to improve ease of ADL activities   3) Right shoulder flexion ROM will improve by 10% to improve ease of overhead reaching     Long Term Goals: 6 weeks   1) Strength will improve by 1/2 grade to allow him to lift 5 pound object onto an overhead shelf with 3/10 pain or less  2) Pain will be no worse then 3/10 during all ADL activities  3)  Pt. Will have sufficient symptom reduction to allow him to return to fishing       Plan     Outpatient Physical Therapy 2 times weekly for 6 weeks to include the following interventions: Manual Therapy, Moist Heat/ Ice, Neuromuscular Re-ed, Patient Education, Therapeutic Activites, Therapeutic Exercise and dry needling       Karl Vu, PTA

## 2019-07-30 ENCOUNTER — OFFICE VISIT (OUTPATIENT)
Dept: ORTHOPEDICS | Facility: CLINIC | Age: 74
End: 2019-07-30
Payer: MEDICARE

## 2019-07-30 VITALS
HEART RATE: 66 BPM | WEIGHT: 195 LBS | SYSTOLIC BLOOD PRESSURE: 163 MMHG | DIASTOLIC BLOOD PRESSURE: 78 MMHG | BODY MASS INDEX: 28.88 KG/M2 | HEIGHT: 69 IN

## 2019-07-30 DIAGNOSIS — M75.121 COMPLETE TEAR OF RIGHT ROTATOR CUFF: Primary | ICD-10-CM

## 2019-07-30 PROCEDURE — 99214 PR OFFICE/OUTPT VISIT, EST, LEVL IV, 30-39 MIN: ICD-10-PCS | Mod: 25,S$GLB,, | Performed by: ORTHOPAEDIC SURGERY

## 2019-07-30 PROCEDURE — 20610 DRAIN/INJ JOINT/BURSA W/O US: CPT | Mod: RT,S$GLB,, | Performed by: ORTHOPAEDIC SURGERY

## 2019-07-30 PROCEDURE — 99999 PR PBB SHADOW E&M-EST. PATIENT-LVL III: ICD-10-PCS | Mod: PBBFAC,,, | Performed by: ORTHOPAEDIC SURGERY

## 2019-07-30 PROCEDURE — 3078F DIAST BP <80 MM HG: CPT | Mod: CPTII,S$GLB,, | Performed by: ORTHOPAEDIC SURGERY

## 2019-07-30 PROCEDURE — 1101F PT FALLS ASSESS-DOCD LE1/YR: CPT | Mod: CPTII,S$GLB,, | Performed by: ORTHOPAEDIC SURGERY

## 2019-07-30 PROCEDURE — 3078F PR MOST RECENT DIASTOLIC BLOOD PRESSURE < 80 MM HG: ICD-10-PCS | Mod: CPTII,S$GLB,, | Performed by: ORTHOPAEDIC SURGERY

## 2019-07-30 PROCEDURE — 3077F PR MOST RECENT SYSTOLIC BLOOD PRESSURE >= 140 MM HG: ICD-10-PCS | Mod: CPTII,S$GLB,, | Performed by: ORTHOPAEDIC SURGERY

## 2019-07-30 PROCEDURE — 99999 PR PBB SHADOW E&M-EST. PATIENT-LVL III: CPT | Mod: PBBFAC,,, | Performed by: ORTHOPAEDIC SURGERY

## 2019-07-30 PROCEDURE — 20610 LARGE JOINT ASPIRATION/INJECTION: R SUBACROMIAL BURSA: ICD-10-PCS | Mod: RT,S$GLB,, | Performed by: ORTHOPAEDIC SURGERY

## 2019-07-30 PROCEDURE — 3077F SYST BP >= 140 MM HG: CPT | Mod: CPTII,S$GLB,, | Performed by: ORTHOPAEDIC SURGERY

## 2019-07-30 PROCEDURE — 99214 OFFICE O/P EST MOD 30 MIN: CPT | Mod: 25,S$GLB,, | Performed by: ORTHOPAEDIC SURGERY

## 2019-07-30 PROCEDURE — 1101F PR PT FALLS ASSESS DOC 0-1 FALLS W/OUT INJ PAST YR: ICD-10-PCS | Mod: CPTII,S$GLB,, | Performed by: ORTHOPAEDIC SURGERY

## 2019-07-30 RX ORDER — KETOROLAC TROMETHAMINE 10 MG/1
10 TABLET, FILM COATED ORAL EVERY 6 HOURS PRN
COMMUNITY
End: 2020-01-02

## 2019-07-30 RX ORDER — METHYLPREDNISOLONE 4 MG/1
TABLET ORAL
Refills: 0 | COMMUNITY
Start: 2019-07-22 | End: 2019-07-30

## 2019-07-30 RX ADMIN — TRIAMCINOLONE ACETONIDE 40 MG: 40 INJECTION, SUSPENSION INTRA-ARTICULAR; INTRAMUSCULAR at 10:07

## 2019-07-30 NOTE — PROCEDURES
Large Joint Aspiration/Injection: R subacromial bursa  Date/Time: 7/30/2019 10:20 AM  Performed by: Alo Fitzgerald MD  Authorized by: Alo Fitzgerald MD     Consent Done?:  Yes (Verbal)  Indications:  Pain  Procedure site marked: Yes    Timeout: Prior to procedure the correct patient, procedure, and site was verified      Location:  Shoulder  Site:  R subacromial bursa  Prep: Patient was prepped and draped in usual sterile fashion    Ultrasonic Guidance for needle placement: No  Needle size:  22 G  Approach:  Anterior  Medications:  40 mg triamcinolone acetonide 40 mg/mL; 40 mg triamcinolone acetonide 40 mg/mL  Patient tolerance:  Patient tolerated the procedure well with no immediate complications

## 2019-08-05 DIAGNOSIS — I10 ESSENTIAL HYPERTENSION: ICD-10-CM

## 2019-08-05 RX ORDER — ATENOLOL 100 MG/1
100 TABLET ORAL DAILY
Qty: 90 TABLET | Refills: 0 | Status: SHIPPED | OUTPATIENT
Start: 2019-08-05 | End: 2019-11-25 | Stop reason: SDUPTHER

## 2019-08-05 NOTE — PROGRESS NOTES
Refill Authorization Note     is requesting a refill authorization.    Brief assessment and rationale for refill: APPROVE: prr  Name and strength of medication: atenolol (TENORMIN) 100 MG tablet       Medication Therapy Plan: HTN- tolerating amlodipine.atenolol. Bs at home w/in normal range, LCO(LOV): BP- elevated at last two ORTHO appt; otherwise controlled; FOVS (11/2019);  Approve 3 more months     Medication reconciliation completed: No              How patient will take medication: t1t po qd          Comments:   Blood Pressure Readings: <139/89mmHg (12 months) Heart Rate: > 50bpm (BB/[facility])   BP Readings from Last 5 Encounters:   07/30/19 (!) 163/78   05/28/19 (!) 154/81   05/28/19 118/82   11/28/18 (!) 150/82   05/28/18 138/82    Pulse Readings from Last 5 Encounters:   07/30/19 66   05/28/19 (!) 57   05/28/19 61   11/28/18 65   05/28/18 (!) 56      Digital Hypertension Data: (values will display if enrolled)  Last 5 Patient Entered Readings                                      Current 30 Day Average:      There is no flowsheet data to display.        APPOINTMENTS(past 12m or future 3m authorizing provider)   Date Provider   LAST VISIT  5/28/2019 Zion Yao MD   NEXT VISIT  11/25/2019 Zion Yao MD

## 2019-08-09 RX ORDER — TRIAMCINOLONE ACETONIDE 40 MG/ML
40 INJECTION, SUSPENSION INTRA-ARTICULAR; INTRAMUSCULAR
Status: DISCONTINUED | OUTPATIENT
Start: 2019-07-30 | End: 2019-08-09 | Stop reason: HOSPADM

## 2019-09-20 DIAGNOSIS — I10 ESSENTIAL HYPERTENSION: ICD-10-CM

## 2019-09-20 RX ORDER — AMLODIPINE BESYLATE 10 MG/1
10 TABLET ORAL DAILY
Qty: 90 TABLET | Refills: 0 | Status: SHIPPED | OUTPATIENT
Start: 2019-09-20 | End: 2019-11-25 | Stop reason: SDUPTHER

## 2019-09-20 NOTE — PROGRESS NOTES
Refill Authorization Note     is requesting a refill authorization.    Brief assessment and rationale for refill: APPROVE: FVOS(11/19)   Name and strength of medication: AMLODIPINE BESYLATE 10 MG TAB       Medication Therapy Plan: LCO/LOV(5/19); HTN, Tolerating amlodipine, atenolol; bp at home is wnl; bp- elevated at last two recent appt; otherwise controlled; FOVS (11/2019);  Approve 3 more months                    How patient will take medication: t1t po qd         BP Readings from Last 3 Encounters:   07/30/19 (!) 163/78   05/28/19 (!) 154/81   05/28/19 118/82

## 2019-11-11 ENCOUNTER — PATIENT OUTREACH (OUTPATIENT)
Dept: ADMINISTRATIVE | Facility: HOSPITAL | Age: 74
End: 2019-11-11

## 2019-11-18 ENCOUNTER — LAB VISIT (OUTPATIENT)
Dept: LAB | Facility: HOSPITAL | Age: 74
End: 2019-11-18
Attending: FAMILY MEDICINE
Payer: MEDICARE

## 2019-11-18 DIAGNOSIS — Z12.5 PROSTATE CANCER SCREENING: ICD-10-CM

## 2019-11-18 DIAGNOSIS — I10 ESSENTIAL HYPERTENSION: ICD-10-CM

## 2019-11-18 LAB
ALBUMIN SERPL BCP-MCNC: 4.1 G/DL (ref 3.5–5.2)
ALP SERPL-CCNC: 56 U/L (ref 55–135)
ALT SERPL W/O P-5'-P-CCNC: 52 U/L (ref 10–44)
ANION GAP SERPL CALC-SCNC: 7 MMOL/L (ref 8–16)
AST SERPL-CCNC: 33 U/L (ref 10–40)
BILIRUB SERPL-MCNC: 1.1 MG/DL (ref 0.1–1)
BUN SERPL-MCNC: 17 MG/DL (ref 8–23)
CALCIUM SERPL-MCNC: 9.5 MG/DL (ref 8.7–10.5)
CHLORIDE SERPL-SCNC: 109 MMOL/L (ref 95–110)
CHOLEST SERPL-MCNC: 184 MG/DL (ref 120–199)
CHOLEST/HDLC SERPL: 5.8 {RATIO} (ref 2–5)
CO2 SERPL-SCNC: 25 MMOL/L (ref 23–29)
COMPLEXED PSA SERPL-MCNC: 1.4 NG/ML (ref 0–4)
CREAT SERPL-MCNC: 1.2 MG/DL (ref 0.5–1.4)
EST. GFR  (AFRICAN AMERICAN): >60 ML/MIN/1.73 M^2
EST. GFR  (NON AFRICAN AMERICAN): 59.6 ML/MIN/1.73 M^2
GLUCOSE SERPL-MCNC: 131 MG/DL (ref 70–110)
HDLC SERPL-MCNC: 32 MG/DL (ref 40–75)
HDLC SERPL: 17.4 % (ref 20–50)
LDLC SERPL CALC-MCNC: 107.2 MG/DL (ref 63–159)
NONHDLC SERPL-MCNC: 152 MG/DL
POTASSIUM SERPL-SCNC: 3.9 MMOL/L (ref 3.5–5.1)
PROT SERPL-MCNC: 7.4 G/DL (ref 6–8.4)
SODIUM SERPL-SCNC: 141 MMOL/L (ref 136–145)
TRIGL SERPL-MCNC: 224 MG/DL (ref 30–150)

## 2019-11-18 PROCEDURE — 36415 COLL VENOUS BLD VENIPUNCTURE: CPT | Mod: PO

## 2019-11-18 PROCEDURE — 80061 LIPID PANEL: CPT

## 2019-11-18 PROCEDURE — 80053 COMPREHEN METABOLIC PANEL: CPT

## 2019-11-18 PROCEDURE — 84153 ASSAY OF PSA TOTAL: CPT

## 2019-11-25 ENCOUNTER — OFFICE VISIT (OUTPATIENT)
Dept: FAMILY MEDICINE | Facility: CLINIC | Age: 74
End: 2019-11-25
Payer: MEDICARE

## 2019-11-25 VITALS
TEMPERATURE: 98 F | SYSTOLIC BLOOD PRESSURE: 146 MMHG | HEART RATE: 62 BPM | OXYGEN SATURATION: 95 % | HEIGHT: 69 IN | WEIGHT: 201.75 LBS | BODY MASS INDEX: 29.88 KG/M2 | DIASTOLIC BLOOD PRESSURE: 80 MMHG

## 2019-11-25 DIAGNOSIS — N40.0 BENIGN NON-NODULAR PROSTATIC HYPERPLASIA WITHOUT LOWER URINARY TRACT SYMPTOMS: ICD-10-CM

## 2019-11-25 DIAGNOSIS — I10 ESSENTIAL HYPERTENSION: ICD-10-CM

## 2019-11-25 DIAGNOSIS — R07.9 CHEST PAIN, UNSPECIFIED TYPE: ICD-10-CM

## 2019-11-25 DIAGNOSIS — H93.13 TINNITUS OF BOTH EARS: ICD-10-CM

## 2019-11-25 DIAGNOSIS — Z00.00 PREVENTATIVE HEALTH CARE: Primary | ICD-10-CM

## 2019-11-25 PROCEDURE — 99999 PR PBB SHADOW E&M-EST. PATIENT-LVL III: CPT | Mod: PBBFAC,,, | Performed by: FAMILY MEDICINE

## 2019-11-25 PROCEDURE — G0008 ADMIN INFLUENZA VIRUS VAC: HCPCS | Mod: S$GLB,,, | Performed by: FAMILY MEDICINE

## 2019-11-25 PROCEDURE — 3077F PR MOST RECENT SYSTOLIC BLOOD PRESSURE >= 140 MM HG: ICD-10-PCS | Mod: CPTII,S$GLB,, | Performed by: FAMILY MEDICINE

## 2019-11-25 PROCEDURE — 93005 ELECTROCARDIOGRAM TRACING: CPT | Mod: S$GLB,,, | Performed by: FAMILY MEDICINE

## 2019-11-25 PROCEDURE — 93010 ELECTROCARDIOGRAM REPORT: CPT | Mod: S$GLB,,, | Performed by: INTERNAL MEDICINE

## 2019-11-25 PROCEDURE — 99499 UNLISTED E&M SERVICE: CPT | Mod: S$GLB,,, | Performed by: FAMILY MEDICINE

## 2019-11-25 PROCEDURE — 90662 IIV NO PRSV INCREASED AG IM: CPT | Mod: S$GLB,,, | Performed by: FAMILY MEDICINE

## 2019-11-25 PROCEDURE — G0008 FLU VACCINE - HIGH DOSE (65+) PRESERVATIVE FREE IM: ICD-10-PCS | Mod: S$GLB,,, | Performed by: FAMILY MEDICINE

## 2019-11-25 PROCEDURE — 90662 FLU VACCINE - HIGH DOSE (65+) PRESERVATIVE FREE IM: ICD-10-PCS | Mod: S$GLB,,, | Performed by: FAMILY MEDICINE

## 2019-11-25 PROCEDURE — 93005 EKG 12-LEAD: ICD-10-PCS | Mod: S$GLB,,, | Performed by: FAMILY MEDICINE

## 2019-11-25 PROCEDURE — 99213 OFFICE O/P EST LOW 20 MIN: CPT | Mod: 25,S$GLB,, | Performed by: FAMILY MEDICINE

## 2019-11-25 PROCEDURE — 99499 RISK ADDL DX/OHS AUDIT: ICD-10-PCS | Mod: S$GLB,,, | Performed by: FAMILY MEDICINE

## 2019-11-25 PROCEDURE — 99213 PR OFFICE/OUTPT VISIT, EST, LEVL III, 20-29 MIN: ICD-10-PCS | Mod: 25,S$GLB,, | Performed by: FAMILY MEDICINE

## 2019-11-25 PROCEDURE — 99999 PR PBB SHADOW E&M-EST. PATIENT-LVL III: ICD-10-PCS | Mod: PBBFAC,,, | Performed by: FAMILY MEDICINE

## 2019-11-25 PROCEDURE — 3079F DIAST BP 80-89 MM HG: CPT | Mod: CPTII,S$GLB,, | Performed by: FAMILY MEDICINE

## 2019-11-25 PROCEDURE — 3077F SYST BP >= 140 MM HG: CPT | Mod: CPTII,S$GLB,, | Performed by: FAMILY MEDICINE

## 2019-11-25 PROCEDURE — 3079F PR MOST RECENT DIASTOLIC BLOOD PRESSURE 80-89 MM HG: ICD-10-PCS | Mod: CPTII,S$GLB,, | Performed by: FAMILY MEDICINE

## 2019-11-25 PROCEDURE — 93010 EKG 12-LEAD: ICD-10-PCS | Mod: S$GLB,,, | Performed by: INTERNAL MEDICINE

## 2019-11-25 RX ORDER — ALPRAZOLAM 0.5 MG/1
0.5 TABLET ORAL NIGHTLY PRN
Qty: 30 TABLET | Refills: 5 | Status: SHIPPED | OUTPATIENT
Start: 2019-11-25 | End: 2020-05-25 | Stop reason: SDUPTHER

## 2019-11-25 NOTE — PROGRESS NOTES
Subjective:       Patient ID: Evan Garcia is a 74 y.o. male.    Chief Complaint: Follow-up (6 Month )    HPI Comments: Here today for 6 month  f/u on chronic health issues.    HTN - tolerating amlodipine, atenolol. BPs at home within normal range.  BPH - tolerating Proscar daily; urine flow has been good  Tinnitus- using Xanax at bedtime to sleep. This is continuous.  Vertigo still happens most day when looking upwards.  H/o gallstones - c/o intermittent RUQ pains after meals    C/o central chest pains which radiate into the neck. Also some associated tingling in hands when this occurs.   He does take aspirin as needed and this helps.  It can occur with activity and rest.  He feels like he needs to belch when he feels the sensation.  Pain 8/10, lasts minutes, aqueezing      Past Medical History:   Diagnosis Date    Ankylosing spondylitis     thoracic;     BPH (benign prostatic hypertrophy)     Cholelithiases     DDD (degenerative disc disease), lumbar     treated with FILOMENA with success    Fatty liver     HTN (hypertension)     Hypertriglyceridemia     Left kidney mass     followed by urology, Dr. De Guzman    Tinnitus of both ears     he consulted with ENT; He uses Xanax PRn    Wears glasses        Past Surgical History:   Procedure Laterality Date    KNEE SURGERY      right; 3 separate surgeries to repair patellar fracture    stapendectomy Bilateral 1985    bilateraly    WISDOM TOOTH EXTRACTION         Allergies   Allergen Reactions    Sulfa (Sulfonamide Antibiotics) Hives and Other (See Comments)     Decreased white blood count per pt       Social History     Socioeconomic History    Marital status:      Spouse name: Not on file    Number of children: 3    Years of education: Not on file    Highest education level: Not on file   Occupational History    Occupation: retired refinery   Social Needs    Financial resource strain: Not on file    Food insecurity:     Worry: Not on file      Inability: Not on file    Transportation needs:     Medical: Not on file     Non-medical: Not on file   Tobacco Use    Smoking status: Former Smoker     Types: Cigars, Cigarettes     Start date: 4/24/2014    Smokeless tobacco: Never Used    Tobacco comment: will smoke a cigar occasionally   Substance and Sexual Activity    Alcohol use: Yes     Comment: occasional    Drug use: No    Sexual activity: Not on file   Lifestyle    Physical activity:     Days per week: Not on file     Minutes per session: Not on file    Stress: Not on file   Relationships    Social connections:     Talks on phone: Not on file     Gets together: Not on file     Attends Sikh service: Not on file     Active member of club or organization: Not on file     Attends meetings of clubs or organizations: Not on file     Relationship status: Not on file   Other Topics Concern    Not on file   Social History Narrative    Hobbies: fish, hunt        Exercise: [employer] 3 times a week with treadmill           Current Outpatient Medications on File Prior to Visit   Medication Sig Dispense Refill    amLODIPine (NORVASC) 10 MG tablet TAKE 1 TABLET (10 MG TOTAL) BY MOUTH ONCE DAILY. 90 tablet 0    atenolol (TENORMIN) 100 MG tablet TAKE 1 TABLET (100 MG TOTAL) BY MOUTH ONCE DAILY. 90 tablet 0    finasteride (PROSCAR) 5 mg tablet TAKE 1 TABLET (5 MG TOTAL) BY MOUTH ONCE DAILY. 90 tablet 3    ketorolac (TORADOL) 10 mg tablet Take 10 mg by mouth every 6 (six) hours as needed for Pain.      meloxicam (MOBIC) 15 MG tablet Take 1 tablet (15 mg total) by mouth once daily. 30 tablet 3    [DISCONTINUED] ALPRAZolam (XANAX) 0.5 MG tablet Take 1 tablet (0.5 mg total) by mouth nightly as needed (tinitus). 30 tablet 5     No current facility-administered medications on file prior to visit.        Family History   Problem Relation Age of Onset    Aneurysm Father     Cancer Mother         stomach    Diabetes Mother     Cancer Brother         salivary  "    Review of Systems   Constitutional: Negative for fever, appetite change, fatigue and unexpected weight change.   HENT: Negative for ear pain, nosebleeds, congestion, sore throat, rhinorrhea, trouble swallowing, neck pain, postnasal drip, sinus pressure and ear discharge.    Respiratory: Negative for apnea, cough, chest tightness, shortness of breath and wheezing.    Cardiovascular: Negative for palpitations and leg swelling.   Gastrointestinal: Negative for nausea, vomiting, abdominal pain, diarrhea, constipation, blood in stool and abdominal distention.   Genitourinary: Negative for dysuria, urgency, frequency, hematuria, flank pain, scrotal swelling, difficulty urinating and testicular pain.   Skin: Negative for color change, rash and wound.   Neurological: Negative for dizziness, tremors, seizures, syncope, speech difficulty, weakness, light-headedness, numbness and headaches.   Hematological: Negative for adenopathy. Does not bruise/bleed easily.   Psychiatric/Behavioral: Negative for suicidal ideas, hallucinations, behavioral problems and confusion.       Objective:      BP (!) 146/80 (BP Location: Left arm, Patient Position: Sitting)   Pulse 62   Temp 97.9 °F (36.6 °C) (Oral)   Ht 5' 9" (1.753 m)   Wt 91.5 kg (201 lb 11.5 oz)   SpO2 95%   BMI 29.79 kg/m²   Physical Exam   Constitutional: He is oriented to person, place, and time. He appears well-developed and well-nourished. He is active and cooperative.   Nose: Nose normal.   Eyelid hypertrophy bilaterally  Mouth/Throat: Oropharynx is clear and moist. No oropharyngeal exudate.   Eyes: Conjunctivae and EOM are normal. Pupils are equal, round, and reactive to light. Right eye exhibits no discharge. Left eye exhibits no discharge. No scleral icterus.   Neck: Trachea normal, normal range of motion and full passive range of motion without pain. Neck supple. Normal carotid pulses and no JVD present. Carotid bruit is not present. No tracheal deviation " present. No mass and no thyromegaly present.   Cardiovascular: Normal rate, regular rhythm, S1 normal, S2 normal, normal heart sounds and intact distal pulses.  Exam reveals no gallop and no friction rub.  No murmur heard.  Pulses:       Carotid pulses are 2+ on the right side, and 2+ on the left side.       Radial pulses are 2+ on the right side, and 2+ on the left side.        Dorsalis pedis pulses are 2+ on the right side, and 2+ on the left side.   Pulmonary/Chest: Effort normal and breath sounds normal. No respiratory distress. He has no wheezes. He has no rales.   Lymphadenopathy:        Head (right side): No tonsillar adenopathy present.        Head (left side): No tonsillar adenopathy present.     He has no cervical adenopathy.   Neurological: He is alert and oriented to person, place, and time. He has normal strength. No cranial nerve deficit. Coordination normal.   Psychiatric: He has a normal mood and affect. His speech is normal and behavior is normal. Judgment and thought content normal.           Results for orders placed or performed in visit on 11/18/19   Comprehensive metabolic panel   Result Value Ref Range    Sodium 141 136 - 145 mmol/L    Potassium 3.9 3.5 - 5.1 mmol/L    Chloride 109 95 - 110 mmol/L    CO2 25 23 - 29 mmol/L    Glucose 131 (H) 70 - 110 mg/dL    BUN, Bld 17 8 - 23 mg/dL    Creatinine 1.2 0.5 - 1.4 mg/dL    Calcium 9.5 8.7 - 10.5 mg/dL    Total Protein 7.4 6.0 - 8.4 g/dL    Albumin 4.1 3.5 - 5.2 g/dL    Total Bilirubin 1.1 (H) 0.1 - 1.0 mg/dL    Alkaline Phosphatase 56 55 - 135 U/L    AST 33 10 - 40 U/L    ALT 52 (H) 10 - 44 U/L    Anion Gap 7 (L) 8 - 16 mmol/L    eGFR if African American >60.0 >60 mL/min/1.73 m^2    eGFR if non  59.6 (A) >60 mL/min/1.73 m^2   Lipid panel   Result Value Ref Range    Cholesterol 184 120 - 199 mg/dL    Triglycerides 224 (H) 30 - 150 mg/dL    HDL 32 (L) 40 - 75 mg/dL    LDL Cholesterol 107.2 63.0 - 159.0 mg/dL    Hdl/Cholesterol Ratio  17.4 (L) 20.0 - 50.0 %    Total Cholesterol/HDL Ratio 5.8 (H) 2.0 - 5.0    Non-HDL Cholesterol 152 mg/dL   PSA, Screening   Result Value Ref Range    PSA, SCREEN 1.4 0.00 - 4.00 ng/mL     EKG: NSR with no ST or T wave changes    Assessment:       1. Preventative health care    2. Chest pain, unspecified type    3. Tinnitus of both ears        Plan:       Preventative health care    Chest pain, unspecified type  -     IN OFFICE EKG 12-LEAD (to Muse)  -     NM Myocardial Perfusion Spect Multi Exer; Future; Expected date: 11/25/2019  -     Treadmill Stress Test; Future    Tinnitus of both ears  -     ALPRAZolam (XANAX) 0.5 MG tablet; Take 1 tablet (0.5 mg total) by mouth nightly as needed (tinitus).  Dispense: 30 tablet; Refill: 5    Other orders  -     Influenza - High Dose (65+) (PF) (IM)       Suspect likely anginal equivalent versus possible hiatal hernia  Consider EGD if stress test normal  Continue present meds  Counseled on regular exercise, maintenance of a healthy weight, balanced diet rich in fruits/vegetables and lean protein, and avoidance of unhealthy habits like smoking and excessive alcohol intake.  Continue low fat diet  F/u 6 months to refill xanax or PRN

## 2019-11-26 RX ORDER — AMLODIPINE BESYLATE 10 MG/1
10 TABLET ORAL DAILY
Qty: 90 TABLET | Refills: 0 | OUTPATIENT
Start: 2019-11-26

## 2019-11-26 RX ORDER — AMLODIPINE BESYLATE 10 MG/1
10 TABLET ORAL DAILY
Qty: 90 TABLET | Refills: 1 | Status: SHIPPED | OUTPATIENT
Start: 2019-11-26 | End: 2020-05-25 | Stop reason: SDUPTHER

## 2019-11-26 RX ORDER — ATENOLOL 100 MG/1
100 TABLET ORAL DAILY
Qty: 90 TABLET | Refills: 1 | Status: SHIPPED | OUTPATIENT
Start: 2019-11-26 | End: 2020-05-20

## 2019-11-26 RX ORDER — FINASTERIDE 5 MG/1
5 TABLET, FILM COATED ORAL DAILY
Qty: 90 TABLET | Refills: 3 | OUTPATIENT
Start: 2019-11-26

## 2019-11-26 NOTE — PROGRESS NOTES
Refill Authorization Note     is requesting a refill authorization.    Brief assessment and rationale for refill: REVIEW: amlodipine and atenolol -abnormal vitals // QUICK DC: finasteride -rts(3/20)  Name and strength of medication: amLODIPine (NORVASC) 10 MG tablet // atenolol (TENORMIN) 100 MG tablet // finasteride (PROSCAR) 5 mg tablet       Medication Therapy Plan: BP elevated                              Comments:  Requested Prescriptions   Pending Prescriptions Disp Refills    amLODIPine (NORVASC) 10 MG tablet 90 tablet 0     Sig: Take 1 tablet (10 mg total) by mouth once daily.       Cardiovascular:  Calcium Channel Blockers Failed - 11/25/2019  9:59 AM        Failed - Last BP in normal range within 360 days     BP Readings from Last 3 Encounters:   11/25/19 (!) 146/80   07/30/19 (!) 163/78   05/28/19 (!) 154/81              Passed - Patient is at least 18 years old        Passed - Office visit in past 12 months or future 90 days     Recent Outpatient Visits            Yesterday Preventative health care    [facility] Zion Yao MD    3 months ago Complete tear of right rotator cuff    [facility] Alo Fitzgerald MD    6 months ago Complete tear of right rotator cuff    [facility] Alo Fitzgerald MD    6 months ago Chronic right shoulder pain    [facility] Zion Yao MD    11 months ago Sensorineural hearing loss (SNHL), bilateral    [place] [facility] Marko Marr MD          Future Appointments              In 6 days STRESS, [facility] - Cardiology, [place]    In 6 months Zion Yao MD [facility]               atenolol (TENORMIN) 100 MG tablet 90 tablet 0     Sig: Take 1 tablet (100 mg total) by mouth once daily.       Cardiovascular:  Beta Blockers Failed - 11/25/2019  9:59 AM        Failed - Last BP in normal range within 360 days     BP Readings from Last 3  Encounters:   11/25/19 (!) 146/80   07/30/19 (!) 163/78   05/28/19 (!) 154/81              Passed - Patient is at least 18 years old        Passed - Last Heart Rate in normal range within 360 days     Pulse Readings from Last 3 Encounters:   11/25/19 62   07/30/19 66   05/28/19 57             Passed - Office visit in past 12 months or future 90 days     Recent Outpatient Visits            Yesterday Preventative health care    [facility] Zion Yao MD    3 months ago Complete tear of right rotator cuff    [facility] Alo Fitzgerald MD    6 months ago Complete tear of right rotator cuff    [place] - Orthopedics Alo Fitzgerald MD    6 months ago Chronic right shoulder pain    [facility] Zion Yao MD    11 months ago Sensorineural hearing loss (SNHL), bilateral    [place] - ENT Marko Marr MD          Future Appointments              In 6 days STRESS, [facility] Cardiology, [place]    In 6 months Zion Yao MD [facility], [place]               Last Prescribed Info:   Ordering Provider: -- MYRTLE #:  -- NPI:  --    Authorizing Provider: Zion Yao MD MYRTLE #:  [id number] NPI:  [id number]    Ordering User:  Zion Yao MD            Diagnosis Association: Benign non-nodular prostatic hyperplasia without lower urinary tract symptoms (N40.0)      Original Order:  finasteride (PROSCAR) 5 mg tablet [[id number]]      Pharmacy:  [facility]/pharmacy #[id number] - [place]  [place] [address] AT [facility] MYRTLE #:  [id number]     Pharmacy Comments:  --          Fill quantity remaining:  -- Fill quantity used:  -- Next fill due: --       Outpatient Medication Detail      Disp Refills Start End    finasteride (PROSCAR) 5 mg tablet 90 tablet 3 3/19/2019     Sig - Route: TAKE 1 TABLET (5 MG TOTAL) BY MOUTH ONCE DAILY. - Oral    Sent to pharmacy as: finasteride (PROSCAR) 5 mg tablet    E-Prescribing Status: Receipt  confirmed by pharmacy (3/19/2019 12:51 PM CDT)

## 2019-11-26 NOTE — PROGRESS NOTES
Refill Authorization Note     is requesting a refill authorization.    Brief assessment and rationale for refill: QUICK DC: duplicate request  Name and strength of medication: amlodipine 10 mg       Medication Therapy Plan: Responded to in alternate encounter                   How patient will take medication: utd          Comments:   Last Prescribed Info:     Original Order:  amLODIPine (NORVASC) 10 MG tablet [[id number]]      Pharmacy:  [facility]/pharmacy #[id number] - [place] - [address] [place], [place] [address] AT [facility] MYRTLE #:  [id number]     Pharmacy Comments:  --          Fill quantity remaining:  -- Fill quantity used:  -- Next fill due: --       Outpatient Medication Detail      Disp Refills Start End    amLODIPine (NORVASC) 10 MG tablet 90 tablet 1 11/26/2019     Sig - Route: Take 1 tablet (10 mg total) by mouth once daily. - Oral    Sent to pharmacy as: amLODIPine (NORVASC) 10 MG tablet    E-Prescribing Status: Receipt confirmed by pharmacy (11/26/2019 11:32 AM CST)

## 2019-11-26 NOTE — TELEPHONE ENCOUNTER
I have reviewed and agree with the assessment below with changes. HTN LCO at LOV (11/19) stating home BP WNL. Approve 6 more until next OV with PCP (5/19).

## 2019-12-02 DIAGNOSIS — R07.9 CHEST PAIN, UNSPECIFIED TYPE: Primary | ICD-10-CM

## 2019-12-18 ENCOUNTER — HOSPITAL ENCOUNTER (OUTPATIENT)
Dept: RADIOLOGY | Facility: HOSPITAL | Age: 74
Discharge: HOME | End: 2019-12-18
Attending: FAMILY MEDICINE
Payer: MEDICARE

## 2019-12-18 ENCOUNTER — CLINICAL SUPPORT (OUTPATIENT)
Dept: CARDIOLOGY | Facility: CLINIC | Age: 74
End: 2019-12-18
Attending: FAMILY MEDICINE
Payer: MEDICARE

## 2019-12-18 VITALS — BODY MASS INDEX: 29.77 KG/M2 | HEIGHT: 69 IN | WEIGHT: 201 LBS

## 2019-12-18 DIAGNOSIS — R07.9 CHEST PAIN, UNSPECIFIED TYPE: ICD-10-CM

## 2019-12-18 LAB
CV STRESS BASE HR: 58 BPM
DIASTOLIC BLOOD PRESSURE: 80 MMHG
NUC REST EJECTION FRACTION: 55
OHS CV CPX 1 MINUTE RECOVERY HEART RATE: 84 BPM
OHS CV CPX 85 PERCENT MAX PREDICTED HEART RATE MALE: 125
OHS CV CPX ESTIMATED METS: 11
OHS CV CPX MAX PREDICTED HEART RATE: 147
OHS CV CPX PATIENT IS FEMALE: 0
OHS CV CPX PATIENT IS MALE: 1
OHS CV CPX PEAK DIASTOLIC BLOOD PRESSURE: 82 MMHG
OHS CV CPX PEAK HEAR RATE: 104 BPM
OHS CV CPX PEAK RATE PRESSURE PRODUCT: NORMAL
OHS CV CPX PEAK SYSTOLIC BLOOD PRESSURE: 186 MMHG
OHS CV CPX PERCENT MAX PREDICTED HEART RATE ACHIEVED: 71
OHS CV CPX RATE PRESSURE PRODUCT PRESENTING: 8758
STRESS ECHO POST EXERCISE DUR MIN: 6 MINUTES
STRESS ECHO POST EXERCISE DUR SEC: 50 SECONDS
STRESS ECHO TARGET HR: 125 BPM
SYSTOLIC BLOOD PRESSURE: 151 MMHG

## 2019-12-18 PROCEDURE — 93015 CV STRESS TEST SUPVJ I&R: CPT | Mod: ,,, | Performed by: INTERNAL MEDICINE

## 2019-12-18 PROCEDURE — 93015 STRESS TEST WITH MYOCARDIAL PERFUSION (CUPID ONLY): ICD-10-PCS | Mod: ,,, | Performed by: INTERNAL MEDICINE

## 2019-12-18 PROCEDURE — 99999 PR PBB SHADOW E&M-EST. PATIENT-LVL I: CPT | Mod: PBBFAC,,,

## 2019-12-18 PROCEDURE — A9502 TC99M TETROFOSMIN: HCPCS | Mod: PO

## 2019-12-18 PROCEDURE — 78452 STRESS TEST WITH MYOCARDIAL PERFUSION (CUPID ONLY): ICD-10-PCS | Mod: 26,,, | Performed by: INTERNAL MEDICINE

## 2019-12-18 PROCEDURE — 78452 HT MUSCLE IMAGE SPECT MULT: CPT | Mod: 26,,, | Performed by: INTERNAL MEDICINE

## 2019-12-18 PROCEDURE — 99999 PR PBB SHADOW E&M-EST. PATIENT-LVL I: ICD-10-PCS | Mod: PBBFAC,,,

## 2019-12-19 ENCOUNTER — TELEPHONE (OUTPATIENT)
Dept: FAMILY MEDICINE | Facility: CLINIC | Age: 74
End: 2019-12-19

## 2019-12-19 ENCOUNTER — OFFICE VISIT (OUTPATIENT)
Dept: CARDIOLOGY | Facility: CLINIC | Age: 74
End: 2019-12-19
Payer: MEDICARE

## 2019-12-19 VITALS
WEIGHT: 199.75 LBS | HEIGHT: 69 IN | BODY MASS INDEX: 29.59 KG/M2 | SYSTOLIC BLOOD PRESSURE: 176 MMHG | DIASTOLIC BLOOD PRESSURE: 90 MMHG | HEART RATE: 60 BPM

## 2019-12-19 DIAGNOSIS — I20.9 ANGINA PECTORIS: ICD-10-CM

## 2019-12-19 DIAGNOSIS — R94.39 ABNORMAL NUCLEAR STRESS TEST: Primary | ICD-10-CM

## 2019-12-19 DIAGNOSIS — Z82.49 FAMILY HISTORY OF ABDOMINAL AORTIC ANEURYSM: Primary | ICD-10-CM

## 2019-12-19 DIAGNOSIS — R94.39 ABNORMAL STRESS TEST: Primary | ICD-10-CM

## 2019-12-19 DIAGNOSIS — R94.39 ABNORMAL NUCLEAR STRESS TEST: ICD-10-CM

## 2019-12-19 PROCEDURE — 1101F PR PT FALLS ASSESS DOC 0-1 FALLS W/OUT INJ PAST YR: ICD-10-PCS | Mod: CPTII,S$GLB,, | Performed by: INTERNAL MEDICINE

## 2019-12-19 PROCEDURE — 99499 UNLISTED E&M SERVICE: CPT | Mod: S$GLB,,, | Performed by: INTERNAL MEDICINE

## 2019-12-19 PROCEDURE — 99204 PR OFFICE/OUTPT VISIT, NEW, LEVL IV, 45-59 MIN: ICD-10-PCS | Mod: S$GLB,,, | Performed by: INTERNAL MEDICINE

## 2019-12-19 PROCEDURE — 99999 PR PBB SHADOW E&M-EST. PATIENT-LVL III: CPT | Mod: PBBFAC,,, | Performed by: INTERNAL MEDICINE

## 2019-12-19 PROCEDURE — 1126F PR PAIN SEVERITY QUANTIFIED, NO PAIN PRESENT: ICD-10-PCS | Mod: S$GLB,,, | Performed by: INTERNAL MEDICINE

## 2019-12-19 PROCEDURE — 99499 RISK ADDL DX/OHS AUDIT: ICD-10-PCS | Mod: S$GLB,,, | Performed by: INTERNAL MEDICINE

## 2019-12-19 PROCEDURE — 99204 OFFICE O/P NEW MOD 45 MIN: CPT | Mod: S$GLB,,, | Performed by: INTERNAL MEDICINE

## 2019-12-19 PROCEDURE — 3077F SYST BP >= 140 MM HG: CPT | Mod: CPTII,S$GLB,, | Performed by: INTERNAL MEDICINE

## 2019-12-19 PROCEDURE — 1126F AMNT PAIN NOTED NONE PRSNT: CPT | Mod: S$GLB,,, | Performed by: INTERNAL MEDICINE

## 2019-12-19 PROCEDURE — 3077F PR MOST RECENT SYSTOLIC BLOOD PRESSURE >= 140 MM HG: ICD-10-PCS | Mod: CPTII,S$GLB,, | Performed by: INTERNAL MEDICINE

## 2019-12-19 PROCEDURE — 1159F PR MEDICATION LIST DOCUMENTED IN MEDICAL RECORD: ICD-10-PCS | Mod: S$GLB,,, | Performed by: INTERNAL MEDICINE

## 2019-12-19 PROCEDURE — 1101F PT FALLS ASSESS-DOCD LE1/YR: CPT | Mod: CPTII,S$GLB,, | Performed by: INTERNAL MEDICINE

## 2019-12-19 PROCEDURE — 3080F PR MOST RECENT DIASTOLIC BLOOD PRESSURE >= 90 MM HG: ICD-10-PCS | Mod: CPTII,S$GLB,, | Performed by: INTERNAL MEDICINE

## 2019-12-19 PROCEDURE — 99999 PR PBB SHADOW E&M-EST. PATIENT-LVL III: ICD-10-PCS | Mod: PBBFAC,,, | Performed by: INTERNAL MEDICINE

## 2019-12-19 PROCEDURE — 1159F MED LIST DOCD IN RCRD: CPT | Mod: S$GLB,,, | Performed by: INTERNAL MEDICINE

## 2019-12-19 PROCEDURE — 3080F DIAST BP >= 90 MM HG: CPT | Mod: CPTII,S$GLB,, | Performed by: INTERNAL MEDICINE

## 2019-12-19 NOTE — LETTER
December 19, 2019      Zion Yao MD  [address] [facility] [address] [place] [address]           [facility]  [address] [place] [address]  Phone: [phone]          Patient: Evan Garcia   MR Number: [id number]   YOB: 1945   Date of Visit: 12/19/2019       Dear Dr. Zion Yao:    Thank you for referring Evan Garcia to me for evaluation. Attached you will find relevant portions of my assessment and plan of care.    If you have questions, please do not hesitate to call me. I look forward to following Evan Garcia along with you.    Sincerely,    Mary Ellen Urbina, DIAMOND    Enclosure  CC:  No Recipients    If you would like to receive this communication electronically, please contact [email or web address] or [phone] to request more information on [employer] access.    For providers and/or their staff who would like to refer a patient to [facility], please contact us through our one-stop-shop provider referral line, [facility] , at [phone].    If you feel you have received this communication in error or would no longer like to receive these types of communications, please e-mail [email or web address]

## 2019-12-19 NOTE — TELEPHONE ENCOUNTER
Informed pt of his results and recommendations.Pt voiced understanding. Pt is scheduled for Cardiology tomorrow.

## 2019-12-19 NOTE — PROGRESS NOTES
Subjective:    Patient ID:  Evan Garcia is a 74 y.o. male who presents for evaluation of Abnormal Stress Test      HPI74 yo WM with HTN and low HDL who has had episodes of CP radiating to the jaw for about a year. Symptoms may be a couple of times per month and not related to exertion. Had nuclear stress test that was read as abnormal with small area of reversible ischemia.     Review of Systems   Constitution: Negative for decreased appetite, fever, malaise/fatigue, weight gain and weight loss.   HENT: Negative for hearing loss and nosebleeds.    Eyes: Negative for visual disturbance.   Cardiovascular: Positive for chest pain. Negative for claudication, cyanosis, dyspnea on exertion, irregular heartbeat, leg swelling, near-syncope, orthopnea, palpitations, paroxysmal nocturnal dyspnea and syncope.   Respiratory: Negative for cough, hemoptysis, shortness of breath, sleep disturbances due to breathing, snoring and wheezing.    Endocrine: Negative for cold intolerance, heat intolerance, polydipsia and polyuria.   Hematologic/Lymphatic: Negative for adenopathy and bleeding problem. Does not bruise/bleed easily.   Skin: Negative for color change, itching, poor wound healing, rash and suspicious lesions.   Musculoskeletal: Negative for arthritis, back pain, falls, joint pain, joint swelling, muscle cramps, muscle weakness and myalgias.   Gastrointestinal: Negative for bloating, abdominal pain, change in bowel habit, constipation, flatus, heartburn, hematemesis, hematochezia, hemorrhoids, jaundice, melena, nausea and vomiting.   Genitourinary: Negative for bladder incontinence, decreased libido, frequency, hematuria, hesitancy and urgency.   Neurological: Negative for brief paralysis, difficulty with concentration, excessive daytime sleepiness, dizziness, focal weakness, headaches, light-headedness, loss of balance, numbness, vertigo and weakness.   Psychiatric/Behavioral: Negative for altered mental status, depression  "and memory loss. The patient does not have insomnia and is not nervous/anxious.    Allergic/Immunologic: Negative for environmental allergies, hives and persistent infections.        Objective:    Physical Exam   Constitutional: He is oriented to person, place, and time. He appears well-developed and well-nourished. No distress.   BP (!) 176/90 (BP Location: Right arm, Patient Position: Sitting)   Pulse 60   Ht 5' 9" (1.753 m)   Wt 90.6 kg (199 lb 11.8 oz)   BMI 29.50 kg/m²      HENT:   Head: Normocephalic and atraumatic.   Eyes: Pupils are equal, round, and reactive to light. Conjunctivae and lids are normal. Right eye exhibits no discharge. No scleral icterus.   Neck: Normal range of motion. Neck supple. No JVD present. No tracheal deviation present. No thyromegaly present.   Cardiovascular: Normal rate, regular rhythm, S1 normal, S2 normal, normal heart sounds and intact distal pulses. Exam reveals no gallop and no friction rub.   No murmur heard.  Pulses:       Carotid pulses are 2+ on the right side, and 2+ on the left side.       Radial pulses are 2+ on the right side, and 2+ on the left side.        Femoral pulses are 2+ on the right side, and 2+ on the left side.       Popliteal pulses are 2+ on the right side, and 2+ on the left side.        Dorsalis pedis pulses are 2+ on the right side, and 2+ on the left side.        Posterior tibial pulses are 2+ on the right side, and 2+ on the left side.   Pulmonary/Chest: Effort normal and breath sounds normal. No respiratory distress. He has no wheezes. He has no rales. He exhibits no tenderness.   Abdominal: Soft. Bowel sounds are normal. He exhibits no distension and no mass. There is no hepatosplenomegaly or hepatomegaly. There is no tenderness. There is no rebound and no guarding.   Musculoskeletal: Normal range of motion. He exhibits no edema or tenderness.   Lymphadenopathy:     He has no cervical adenopathy.   Neurological: He is alert and oriented to " person, place, and time. He has normal reflexes. No cranial nerve deficit. Coordination normal.   Skin: Skin is warm and dry. No rash noted. He is not diaphoretic. No erythema. No pallor.   Psychiatric: He has a normal mood and affect. His speech is normal and behavior is normal. Judgment and thought content normal. Cognition and memory are normal.         Assessment:       1. Family history of abdominal aortic aneurysm    2. Angina pectoris    3. Abnormal nuclear stress test         Plan:     Symptoms are suspicious for angina despite occurring at rest and has abnormal nuclear stress test    Discussed [facility] with patient and agreeable    Also family hx of AAA      Orders Placed This Encounter   Procedures    CV Abdominal Aorta   Schedule left heart cath  Follow up in about 6 weeks (around 1/30/2020).

## 2019-12-19 NOTE — PATIENT INSTRUCTIONS
Angiogram with Dr. Tripp    Arrive for procedure at: [facility] on Monday January 6th 2020. Your procedure will start at 11 a.m.     You will receive a phone call from [facility] Pre-Op Department with arrival time and further instructions prior to your scheduled procedure.    Notify the nurse if you are ALLERGIC TO IODINE.    FASTING: You MAY NOT have anything to eat or drink AFTER MIDNIGHT the day before your procedure.     MEDICATIONS: You may take your regular morning medications with water. If there are any medications that you should not take, you will be instructed to hold them for that morning.    ? CARDIOLOGY PRE-PROCEDURE MEDICATION ORDERS:  ** Please hold any medications that are checked below:      CONTINUE the Following Medications      ? Aspirin    WHAT TO EXPECT:    How long will the procedure take?  The procedure will take an average of 1 - 2 hours to perform.  After the procedure, you will need to lay flat for around 4 - 6 hours to minimize bleeding from the puncture site. If the wrist is accessed you will need to keep your arm still as instructed by the nurse.    When can I go home?  You may be able to be discharged home that same afternoon if there were no complications.  If you have one of the following: balloon; stent; pacemaker or defibrillator procedures, you may spend one night for observation.  Your doctor will determine your discharge based upon your progress.  The results of your procedure will be discussed with you before you are discharged.  Any further testing or procedures will be scheduled for you either before you leave or you will be instructed to call for a future appointment.      TRANSPORTATION:  PLEASE ARRANGE TO HAVE SOMEONE DRIVE YOU HOME FOLLOWING YOUR PROCEDURE, YOU WILL NOT BE ALLOWED TO DRIVE.

## 2020-01-02 ENCOUNTER — TELEPHONE (OUTPATIENT)
Dept: FAMILY MEDICINE | Facility: CLINIC | Age: 75
End: 2020-01-02

## 2020-01-02 NOTE — TELEPHONE ENCOUNTER
----- Message from Bill Contreras sent at 1/2/2020 10:03 AM CST -----  Contact: same  Patient called in and stated he thinks office may have just tried to call him but not??  Patient stated he is not sure what it would be about??    Patient call back number is [phone]

## 2020-01-06 PROBLEM — R94.39 ABNORMAL STRESS TEST: Status: ACTIVE | Noted: 2020-01-06

## 2020-01-30 ENCOUNTER — OFFICE VISIT (OUTPATIENT)
Dept: CARDIOLOGY | Facility: CLINIC | Age: 75
End: 2020-01-30
Payer: MEDICARE

## 2020-01-30 VITALS
WEIGHT: 197.75 LBS | SYSTOLIC BLOOD PRESSURE: 171 MMHG | DIASTOLIC BLOOD PRESSURE: 86 MMHG | HEIGHT: 69 IN | HEART RATE: 57 BPM | BODY MASS INDEX: 29.29 KG/M2

## 2020-01-30 DIAGNOSIS — I10 ESSENTIAL HYPERTENSION: ICD-10-CM

## 2020-01-30 DIAGNOSIS — R94.39 ABNORMAL NUCLEAR STRESS TEST: ICD-10-CM

## 2020-01-30 DIAGNOSIS — I25.10 CORONARY ARTERY DISEASE, ANGINA PRESENCE UNSPECIFIED, UNSPECIFIED VESSEL OR LESION TYPE, UNSPECIFIED WHETHER NATIVE OR TRANSPLANTED HEART: Primary | ICD-10-CM

## 2020-01-30 DIAGNOSIS — I20.9 ANGINA PECTORIS: ICD-10-CM

## 2020-01-30 PROCEDURE — 99999 PR PBB SHADOW E&M-EST. PATIENT-LVL III: CPT | Mod: PBBFAC,,, | Performed by: INTERNAL MEDICINE

## 2020-01-30 PROCEDURE — 3077F SYST BP >= 140 MM HG: CPT | Mod: CPTII,S$GLB,, | Performed by: INTERNAL MEDICINE

## 2020-01-30 PROCEDURE — 1101F PR PT FALLS ASSESS DOC 0-1 FALLS W/OUT INJ PAST YR: ICD-10-PCS | Mod: CPTII,S$GLB,, | Performed by: INTERNAL MEDICINE

## 2020-01-30 PROCEDURE — 1126F PR PAIN SEVERITY QUANTIFIED, NO PAIN PRESENT: ICD-10-PCS | Mod: S$GLB,,, | Performed by: INTERNAL MEDICINE

## 2020-01-30 PROCEDURE — 1159F PR MEDICATION LIST DOCUMENTED IN MEDICAL RECORD: ICD-10-PCS | Mod: S$GLB,,, | Performed by: INTERNAL MEDICINE

## 2020-01-30 PROCEDURE — 99499 UNLISTED E&M SERVICE: CPT | Mod: S$GLB,,, | Performed by: INTERNAL MEDICINE

## 2020-01-30 PROCEDURE — 3079F DIAST BP 80-89 MM HG: CPT | Mod: CPTII,S$GLB,, | Performed by: INTERNAL MEDICINE

## 2020-01-30 PROCEDURE — 99499 RISK ADDL DX/OHS AUDIT: ICD-10-PCS | Mod: S$GLB,,, | Performed by: INTERNAL MEDICINE

## 2020-01-30 PROCEDURE — 1126F AMNT PAIN NOTED NONE PRSNT: CPT | Mod: S$GLB,,, | Performed by: INTERNAL MEDICINE

## 2020-01-30 PROCEDURE — 1159F MED LIST DOCD IN RCRD: CPT | Mod: S$GLB,,, | Performed by: INTERNAL MEDICINE

## 2020-01-30 PROCEDURE — 1101F PT FALLS ASSESS-DOCD LE1/YR: CPT | Mod: CPTII,S$GLB,, | Performed by: INTERNAL MEDICINE

## 2020-01-30 PROCEDURE — 3079F PR MOST RECENT DIASTOLIC BLOOD PRESSURE 80-89 MM HG: ICD-10-PCS | Mod: CPTII,S$GLB,, | Performed by: INTERNAL MEDICINE

## 2020-01-30 PROCEDURE — 3077F PR MOST RECENT SYSTOLIC BLOOD PRESSURE >= 140 MM HG: ICD-10-PCS | Mod: CPTII,S$GLB,, | Performed by: INTERNAL MEDICINE

## 2020-01-30 PROCEDURE — 99214 PR OFFICE/OUTPT VISIT, EST, LEVL IV, 30-39 MIN: ICD-10-PCS | Mod: S$GLB,,, | Performed by: INTERNAL MEDICINE

## 2020-01-30 PROCEDURE — 99214 OFFICE O/P EST MOD 30 MIN: CPT | Mod: S$GLB,,, | Performed by: INTERNAL MEDICINE

## 2020-01-30 PROCEDURE — 99999 PR PBB SHADOW E&M-EST. PATIENT-LVL III: ICD-10-PCS | Mod: PBBFAC,,, | Performed by: INTERNAL MEDICINE

## 2020-01-30 RX ORDER — CHLORTHALIDONE 25 MG/1
25 TABLET ORAL DAILY
Qty: 60 TABLET | Refills: 3 | Status: SHIPPED | OUTPATIENT
Start: 2020-01-30 | End: 2020-08-13

## 2020-01-30 RX ORDER — CHLORTHALIDONE 25 MG/1
25 TABLET ORAL DAILY
Qty: 60 TABLET | Refills: 3 | Status: SHIPPED | OUTPATIENT
Start: 2020-01-30 | End: 2020-01-30 | Stop reason: SDUPTHER

## 2020-01-30 RX ORDER — PRAVASTATIN SODIUM 40 MG/1
40 TABLET ORAL DAILY
Qty: 90 TABLET | Refills: 3 | Status: SHIPPED | OUTPATIENT
Start: 2020-01-30 | End: 2020-05-22

## 2020-01-30 NOTE — PROGRESS NOTES
Subjective:    Patient ID:  Evan Garcia is a 74 y.o. male who presents for evaluation of Follow-up (follow up after procedure )      HPI74 yo WM with hx of CP and abnormal nuclear stress test that had cath on January 6, 2020 with stent to CFX-OM. States the CP with radiation to the jaw has gone. BP up and has not been following low salt diet.    Review of Systems   Constitution: Negative for decreased appetite, fever, malaise/fatigue, weight gain and weight loss.   HENT: Positive for tinnitus. Negative for hearing loss and nosebleeds.    Eyes: Negative for visual disturbance.   Cardiovascular: Negative for chest pain, claudication, cyanosis, dyspnea on exertion, irregular heartbeat, leg swelling, near-syncope, orthopnea, palpitations, paroxysmal nocturnal dyspnea and syncope.   Respiratory: Negative for cough, hemoptysis, shortness of breath, sleep disturbances due to breathing, snoring and wheezing.    Endocrine: Negative for cold intolerance, heat intolerance, polydipsia and polyuria.   Hematologic/Lymphatic: Negative for adenopathy and bleeding problem. Does not bruise/bleed easily.   Skin: Negative for color change, itching, poor wound healing, rash and suspicious lesions.   Musculoskeletal: Negative for arthritis, back pain, falls, joint pain, joint swelling, muscle cramps, muscle weakness and myalgias.   Gastrointestinal: Negative for bloating, abdominal pain, change in bowel habit, constipation, flatus, heartburn, hematemesis, hematochezia, hemorrhoids, jaundice, melena, nausea and vomiting.   Genitourinary: Negative for bladder incontinence, decreased libido, frequency, hematuria, hesitancy and urgency.   Neurological: Negative for brief paralysis, difficulty with concentration, excessive daytime sleepiness, dizziness, focal weakness, headaches, light-headedness, loss of balance, numbness, vertigo and weakness.   Psychiatric/Behavioral: Negative for altered mental status, depression and memory loss. The  "patient does not have insomnia and is not nervous/anxious.    Allergic/Immunologic: Negative for environmental allergies, hives and persistent infections.        Objective:    Physical Exam   Constitutional: He is oriented to person, place, and time. He appears well-developed and well-nourished. No distress.   BP (!) 171/86   Pulse (!) 57   Ht 5' 9" (1.753 m)   Wt 89.7 kg (197 lb 12 oz)   BMI 29.20 kg/m²      HENT:   Head: Normocephalic and atraumatic.   Eyes: Pupils are equal, round, and reactive to light. Conjunctivae and lids are normal. Right eye exhibits no discharge. No scleral icterus.   Neck: Normal range of motion. Neck supple. No JVD present. No tracheal deviation present. No thyromegaly present.   Cardiovascular: Normal rate, regular rhythm, S1 normal, S2 normal, normal heart sounds and intact distal pulses. Exam reveals no gallop and no friction rub.   No murmur heard.  Pulses:       Carotid pulses are 2+ on the right side, and 2+ on the left side.       Radial pulses are 2+ on the right side, and 2+ on the left side.        Femoral pulses are 2+ on the right side, and 2+ on the left side.       Popliteal pulses are 2+ on the right side, and 2+ on the left side.        Dorsalis pedis pulses are 2+ on the right side, and 2+ on the left side.        Posterior tibial pulses are 2+ on the right side, and 2+ on the left side.   Pulmonary/Chest: Effort normal and breath sounds normal. No respiratory distress. He has no wheezes. He has no rales. He exhibits no tenderness.   Abdominal: Soft. Bowel sounds are normal. He exhibits no distension and no mass. There is no hepatosplenomegaly or hepatomegaly. There is no tenderness. There is no rebound and no guarding.   Musculoskeletal: Normal range of motion. He exhibits no edema or tenderness.   Lymphadenopathy:     He has no cervical adenopathy.   Neurological: He is alert and oriented to person, place, and time. He has normal reflexes. No cranial nerve deficit. " Coordination normal.   Skin: Skin is warm and dry. No rash noted. He is not diaphoretic. No erythema. No pallor.   Psychiatric: He has a normal mood and affect. His speech is normal and behavior is normal. Judgment and thought content normal. Cognition and memory are normal.         Assessment:       1. Coronary artery disease, angina presence unspecified, unspecified vessel or lesion type, unspecified whether native or transplanted heart    2. Angina pectoris    3. Abnormal nuclear stress test    4. Essential hypertension         Plan:     Add chlorthalidone 12.5 mg daily    Add pravachol 40 mg daily    Patient advised to modify risk factors such as weight, exercise, diet,  tobacco and alcohol exposure    Low salt diet  Orders Placed This Encounter   Procedures    Comprehensive metabolic panel    Lipid panel     Follow up in about 3 months (around 4/30/2020).

## 2020-02-13 ENCOUNTER — LAB VISIT (OUTPATIENT)
Dept: LAB | Facility: HOSPITAL | Age: 75
End: 2020-02-13
Attending: INTERNAL MEDICINE
Payer: MEDICARE

## 2020-02-13 DIAGNOSIS — I25.10 CORONARY ARTERY DISEASE, ANGINA PRESENCE UNSPECIFIED, UNSPECIFIED VESSEL OR LESION TYPE, UNSPECIFIED WHETHER NATIVE OR TRANSPLANTED HEART: ICD-10-CM

## 2020-02-13 PROCEDURE — 80061 LIPID PANEL: CPT

## 2020-02-13 PROCEDURE — 80053 COMPREHEN METABOLIC PANEL: CPT

## 2020-02-13 PROCEDURE — 36415 COLL VENOUS BLD VENIPUNCTURE: CPT | Mod: PO

## 2020-02-14 ENCOUNTER — TELEPHONE (OUTPATIENT)
Dept: CARDIOLOGY | Facility: CLINIC | Age: 75
End: 2020-02-14

## 2020-02-14 LAB
ALBUMIN SERPL BCP-MCNC: 4.3 G/DL (ref 3.5–5.2)
ALP SERPL-CCNC: 58 U/L (ref 55–135)
ALT SERPL W/O P-5'-P-CCNC: 40 U/L (ref 10–44)
ANION GAP SERPL CALC-SCNC: 10 MMOL/L (ref 8–16)
AST SERPL-CCNC: 28 U/L (ref 10–40)
BILIRUB SERPL-MCNC: 0.8 MG/DL (ref 0.1–1)
BUN SERPL-MCNC: 21 MG/DL (ref 8–23)
CALCIUM SERPL-MCNC: 9.8 MG/DL (ref 8.7–10.5)
CHLORIDE SERPL-SCNC: 105 MMOL/L (ref 95–110)
CHOLEST SERPL-MCNC: 142 MG/DL (ref 120–199)
CHOLEST/HDLC SERPL: 4.6 {RATIO} (ref 2–5)
CO2 SERPL-SCNC: 27 MMOL/L (ref 23–29)
CREAT SERPL-MCNC: 1.6 MG/DL (ref 0.5–1.4)
EST. GFR  (AFRICAN AMERICAN): 48.7 ML/MIN/1.73 M^2
EST. GFR  (NON AFRICAN AMERICAN): 42.1 ML/MIN/1.73 M^2
GLUCOSE SERPL-MCNC: 122 MG/DL (ref 70–110)
HDLC SERPL-MCNC: 31 MG/DL (ref 40–75)
HDLC SERPL: 21.8 % (ref 20–50)
LDLC SERPL CALC-MCNC: 85.4 MG/DL (ref 63–159)
NONHDLC SERPL-MCNC: 111 MG/DL
POTASSIUM SERPL-SCNC: 4.1 MMOL/L (ref 3.5–5.1)
PROT SERPL-MCNC: 7.8 G/DL (ref 6–8.4)
SODIUM SERPL-SCNC: 142 MMOL/L (ref 136–145)
TRIGL SERPL-MCNC: 128 MG/DL (ref 30–150)

## 2020-02-14 NOTE — TELEPHONE ENCOUNTER
Pt returning your call----- Message from Jyoti Regan sent at 2/14/2020  8:38 AM CST -----  Contact: Patient  Type:  Patient Returning Call    Who Called:  Patient  Who Left Message for Patient:  Dr Chandra  Does the patient know what this is regarding?:  Unsure  Best Call Back Number:    Additional Information:  Please Advise-thank you

## 2020-03-08 NOTE — LETTER
August 21, 2018      Zion Yao MD  [address] [facility] [address] [place] [address]           [facility]  [address] [facility] [address] [place] [address]  Phone: [phone]  Fax: [phone]          Patient: Evan Garcia   MR Number: [id number]   YOB: 1945   Date of Visit: 8/21/2018       Dear Dr. Zion Yao:    Thank you for referring Evan Garcia to me for evaluation. Attached you will find relevant portions of my assessment and plan of care.    If you have questions, please do not hesitate to call me. I look forward to following Evan Garcia along with you.    Sincerely,    Leta Thomas MD    Enclosure  CC:  No Recipients    If you would like to receive this communication electronically, please contact [email or web address] or [phone] to request more information on [employer] Link access.    For providers and/or their staff who would like to refer a patient to [facility], please contact us through our one-stop-shop provider referral line, [facility], at [phone].    If you feel you have received this communication in error or would no longer like to receive these types of communications, please e-mail [email or web address]          COUGH

## 2020-04-07 ENCOUNTER — DOCUMENTATION ONLY (OUTPATIENT)
Dept: REHABILITATION | Facility: HOSPITAL | Age: 75
End: 2020-04-07

## 2020-04-13 ENCOUNTER — TELEPHONE (OUTPATIENT)
Dept: ORTHOPEDICS | Facility: CLINIC | Age: 75
End: 2020-04-13

## 2020-04-13 ENCOUNTER — TELEPHONE (OUTPATIENT)
Dept: FAMILY MEDICINE | Facility: CLINIC | Age: 75
End: 2020-04-13

## 2020-04-13 ENCOUNTER — OFFICE VISIT (OUTPATIENT)
Dept: FAMILY MEDICINE | Facility: CLINIC | Age: 75
End: 2020-04-13
Payer: MEDICARE

## 2020-04-13 DIAGNOSIS — S39.92XD INJURY OF COCCYX, SUBSEQUENT ENCOUNTER: Primary | ICD-10-CM

## 2020-04-13 PROCEDURE — 99442 PR PHYSICIAN TELEPHONE EVALUATION 11-20 MIN: ICD-10-PCS | Mod: 95,,, | Performed by: NURSE PRACTITIONER

## 2020-04-13 PROCEDURE — 99442 PR PHYSICIAN TELEPHONE EVALUATION 11-20 MIN: CPT | Mod: 95,,, | Performed by: NURSE PRACTITIONER

## 2020-04-13 NOTE — PROGRESS NOTES
Subjective:       Patient ID: Evan Garcia is a 74 y.o. male.    Chief Complaint: No chief complaint on file.    The patient location is: [place]  The chief complaint leading to consultation is: tailbone pain  Visit type: Virtual visit with synchronous audio and video  Total time spent with patient: 15 minutes  Each patient to whom he or she provides medical services by telemedicine is:  (1) informed of the relationship between the physician and patient and the respective role of any other health care provider with respect to management of the patient; and (2) notified that he or she may decline to receive medical services by telemedicine and may withdraw from such care at any time.    Notes:     Patient states that he was getting into the bathtub and he slipped and fell onto his buttocks 3/27/2020. Went to urgent care for evaluation. Treated with tramadol and medrol dose pack, which he has completed. Has been doing hot and cold compresses. Currently pain is only with certain positions 7-8/10. No numbness or tingling. Pain will sometimes radiate aj the left leg. No difficulty walking.   Only taking the tramadol prn. He has taken Meloxicam in the past for a similar injury last year. Not taking it this time.     Past Medical History:  No date: Ankylosing spondylitis      Comment:  thoracic;   No date: BPH (benign prostatic hypertrophy)  No date: Cholelithiases  No date: DDD (degenerative disc disease), lumbar      Comment:  treated with FILOMENA with success  No date: Fatty liver  No date: HTN (hypertension)  No date: Hypertriglyceridemia  No date: Left kidney mass      Comment:  followed by urology, Dr. De Guzman  No date: Tinnitus of both ears      Comment:  he consulted with ENT; He uses Xanax PRn  No date: Wears glasses    Past Surgical History:  1/6/2020: CORONARY ANGIOGRAPHY      Comment:  Procedure: ANGIOGRAM, CORONARY ARTERY;  Surgeon: Uday Tripp MD;  Location: [facility];  Service:  Cardiology;;  No date: HERNIA REPAIR  No date: KNEE SURGERY      Comment:  right; 3 separate surgeries to repair patellar fracture  1985: stapendectomy; Bilateral      Comment:  bilateraly  No date: WISDOM TOOTH EXTRACTION    Review of patient's family history indicates:  Problem: Aneurysm      Relation: Father          Age of Onset: (Not Specified)  Problem: Cancer      Relation: Mother          Age of Onset: (Not Specified)          Comment: stomach  Problem: Diabetes      Relation: Mother          Age of Onset: (Not Specified)  Problem: Cancer      Relation: Brother          Age of Onset: (Not Specified)          Comment: salivary      Social History    Socioeconomic History      Marital status:       Spouse name: Not on file      Number of children: 3      Years of education: Not on file      Highest education level: Not on file    Occupational History      Occupation: retired [employer]    Social Needs      Financial resource strain: Not on file      Food insecurity:        Worry: Not on file        Inability: Not on file      Transportation needs:        Medical: Not on file        Non-medical: Not on file    Tobacco Use      Smoking status: Former Smoker        Types: Cigars, Cigarettes        Start date: 4/24/2014      Smokeless tobacco: Never Used    Substance and Sexual Activity      Alcohol use: Yes        Comment: occasional      Drug use: No      Sexual activity: Not on file    Lifestyle      Physical activity:        Days per week: Not on file        Minutes per session: Not on file      Stress: Not on file    Relationships      Social connections:        Talks on phone: Not on file        Gets together: Not on file        Attends Church service: Not on file        Active member of club or organization: Not on file        Attends meetings of clubs or organizations: Not on file        Relationship status: Not on file    Other Topics      Concerns:        Not on file    Social History  Narrative      Hobbies: fish, hunt            Exercise: [employer] 3 times a week with treadmill            Current Outpatient Medications:  acetaminophen (TYLENOL) 325 MG tablet, Take 1 tablet (325 mg total) by mouth every 6 (six) hours as needed for Pain., Disp: , Rfl:   ALPRAZolam (XANAX) 0.5 MG tablet, Take 1 tablet (0.5 mg total) by mouth nightly as needed (tinitus)., Disp: 30 tablet, Rfl: 5  amLODIPine (NORVASC) 10 MG tablet, Take 1 tablet (10 mg total) by mouth once daily., Disp: 90 tablet, Rfl: 1  atenolol (TENORMIN) 100 MG tablet, Take 1 tablet (100 mg total) by mouth once daily., Disp: 90 tablet, Rfl: 1  chlorthalidone (HYGROTEN) 25 MG Tab, Take 1 tablet (25 mg total) by mouth once daily. One half tablet po daily, Disp: 60 tablet, Rfl: 3  clopidogrel (PLAVIX) 75 mg tablet, Take 1 tablet (75 mg total) by mouth once daily., Disp: 90 tablet, Rfl: 3  finasteride (PROSCAR) 5 mg tablet, TAKE 1 TABLET (5 MG TOTAL) BY MOUTH ONCE DAILY., Disp: 90 tablet, Rfl: 3  meloxicam (MOBIC) 15 MG tablet, Take 1 tablet (15 mg total) by mouth once daily., Disp: 30 tablet, Rfl: 3  pravastatin (PRAVACHOL) 40 MG tablet, Take 1 tablet (40 mg total) by mouth once daily., Disp: 90 tablet, Rfl: 3    No current facility-administered medications for this visit.       Review of patient's allergies indicates:   -- Sulfa (sulfonamide antibiotics) -- Hives and Other (See Comments)    --  Decreased white blood count per pt    Review of Systems   Constitutional: Negative for fatigue and fever.   Respiratory: Negative.    Cardiovascular: Negative.    Musculoskeletal: Positive for arthralgias and back pain. Negative for gait problem, joint swelling, myalgias, neck pain and neck stiffness.   Neurological: Negative.        Objective:      Physical Exam   HENT:   telephone       Assessment:       1. Injury of coccyx, subsequent encounter        Plan:       1. Injury of coccyx, subsequent encounter  Mobic 15 mg daily (has at home) x 10 days, may use  tramadol as prescribed,  Cushioning of area and precautions discussed. Follow up as discussed.

## 2020-04-13 NOTE — TELEPHONE ENCOUNTER
----- Message from Irais Jacobsen sent at 4/13/2020 11:25 AM CDT -----  Type: Needs Medical Advice  Who Called:  patient  Symptoms (please be specific):  Fell and hurt tailbone  How long has patient had these symptoms:  1 week  Best Call Back Number: [phone]  Additional Information: went to urgent care and was told to get an appt with PCP. Also tried to get an appt with ortho but was told to schedule appt with PCP. Please give call back

## 2020-04-13 NOTE — TELEPHONE ENCOUNTER
"Pt reports injury occurred 1 1/2 weeks ago.  Pt reports he went to Urgent Care.  Pt stated "They said there was nothing they can do about it."  Pt reports he was prescribed pain medication.  Pt states "I'm still in a lot of pain."  Pt reports he was diagnosed with bruising of his tailbone and experiencing a lot of pain during bowel movements. Due to current coronavirus recommendations I recommended pt contact his family doctor for this concern.  Pt verbalized understanding and stated he would call his primary care doctor.  "

## 2020-04-13 NOTE — TELEPHONE ENCOUNTER
----- Message from Irais Saravia sent at 4/13/2020 10:07 AM CDT -----  Contact: pt  Type:  Sooner Apoointment Request    Caller is requesting a sooner appointment.  Caller declined first available appointment listed below.  Caller will not accept being placed on the waitlist and is requesting a message be sent to doctor.  Name of Caller PtWhen is the first available appointment?    Symptoms: fell and hurt tail bone Would the patient rather a call back or a response via [facility]?   [facility] Call Back [phone]  Additional Information:

## 2020-05-19 DIAGNOSIS — I10 ESSENTIAL HYPERTENSION: ICD-10-CM

## 2020-05-19 NOTE — TELEPHONE ENCOUNTER
Care Due:                  Date            Visit Type   Department     Provider  --------------------------------------------------------------------------------                                ESTABLISHED                              PATIENT      [facility]  Last Visit: 11-      [facility]       CHANTAL WALKER                              ESTABLISHED                              PATIENT      [facility]  Next Visit: 05-      [facility]       CHANTAL WALKER                                                            Last  Test          Frequency    Reason                     Performed    Due Date  --------------------------------------------------------------------------------    Cr..........  6 months...  chlorthalidone...........  02- 08-    eGFR........  6 months...  chlorthalidone...........  Not Found    Overdue    K...........  6 months...  chlorthalidone...........  02- 08-    Lipid Panel.  6 months...  pravastatin..............  Not Found    Overdue    Na..........  6 months...  chlorthalidone...........  02- 08-    Powered by [employer]. Reference number: [id number]. 5/19/2020 11:17:02 AM   CDT

## 2020-05-20 RX ORDER — ATENOLOL 100 MG/1
TABLET ORAL
Qty: 90 TABLET | Refills: 0 | Status: SHIPPED | OUTPATIENT
Start: 2020-05-20 | End: 2020-05-22

## 2020-05-20 NOTE — TELEPHONE ENCOUNTER
Refill Authorization Note    is requesting a refill authorization.    Brief assessment and rationale for refill: APPROVE: prr          Medication Therapy Plan: FOVS    Medication reconciliation completed: No                         Comments:      Requested Prescriptions   Signed Prescriptions Disp Refills    atenoloL (TENORMIN) 100 MG tablet 90 tablet 0     Sig: TAKE 1 TABLET BY MOUTH EVERY DAY       Cardiovascular:  Beta Blockers Failed - 5/19/2020 11:15 AM        Failed - Last BP in normal range within 360 days.     BP Readings from Last 3 Encounters:   01/30/20 (!) 171/86   01/06/20 (!) 145/67   12/19/19 (!) 176/90              Passed - Patient is at least 18 years old        Passed - Last Heart Rate in normal range within 360 days.     Pulse Readings from Last 3 Encounters:   01/30/20 57   01/06/20 69   12/19/19 60             Passed - Office visit in past 12 months or future 90 days.     Recent Outpatient Visits            1 month ago Injury of coccyx, subsequent encounter    [facility] Jyoti Tobar NP    3 months ago Coronary artery disease, angina presence unspecified, unspecified vessel or lesion type, unspecified whether native or transplanted heart    [facility] Kenny Chandra Jr., MD    5 months ago Family history of abdominal aortic aneurysm    [facility] Kenny Chandra Jr., MD    5 months ago Preventative health care    [facility] Zion Yao MD    9 months ago Complete tear of right rotator cuff    [facility] Alo Fitzgerald MD          Future Appointments              In 2 days [facility]    In 5 days Zion Yao MD [facility]                 Appointments  past 12m or future 3m with PCP    Date Provider   Last Visit   11/25/2019 Zion Yao MD   Next Visit   5/25/2020 Zion Yao MD   ED visits  in past 90 days: [unfilled]     Note composed:2:45 PM 05/20/2020

## 2020-05-22 ENCOUNTER — OFFICE VISIT (OUTPATIENT)
Dept: CARDIOLOGY | Facility: CLINIC | Age: 75
End: 2020-05-22
Payer: MEDICARE

## 2020-05-22 VITALS
HEART RATE: 64 BPM | WEIGHT: 193.56 LBS | DIASTOLIC BLOOD PRESSURE: 88 MMHG | BODY MASS INDEX: 28.67 KG/M2 | SYSTOLIC BLOOD PRESSURE: 144 MMHG | HEIGHT: 69 IN

## 2020-05-22 DIAGNOSIS — M79.10 PAIN IN THE MUSCLES: ICD-10-CM

## 2020-05-22 DIAGNOSIS — I10 ESSENTIAL HYPERTENSION: ICD-10-CM

## 2020-05-22 DIAGNOSIS — I20.9 ANGINA PECTORIS: ICD-10-CM

## 2020-05-22 DIAGNOSIS — I25.10 CORONARY ARTERY DISEASE INVOLVING NATIVE CORONARY ARTERY OF NATIVE HEART WITHOUT ANGINA PECTORIS: ICD-10-CM

## 2020-05-22 PROCEDURE — 93000 ELECTROCARDIOGRAM COMPLETE: CPT | Mod: S$GLB,,, | Performed by: INTERNAL MEDICINE

## 2020-05-22 PROCEDURE — 3079F DIAST BP 80-89 MM HG: CPT | Mod: CPTII,S$GLB,, | Performed by: INTERNAL MEDICINE

## 2020-05-22 PROCEDURE — 93000 EKG 12-LEAD: ICD-10-PCS | Mod: S$GLB,,, | Performed by: INTERNAL MEDICINE

## 2020-05-22 PROCEDURE — 3077F SYST BP >= 140 MM HG: CPT | Mod: CPTII,S$GLB,, | Performed by: INTERNAL MEDICINE

## 2020-05-22 PROCEDURE — 99214 PR OFFICE/OUTPT VISIT, EST, LEVL IV, 30-39 MIN: ICD-10-PCS | Mod: S$GLB,,, | Performed by: INTERNAL MEDICINE

## 2020-05-22 PROCEDURE — 3079F PR MOST RECENT DIASTOLIC BLOOD PRESSURE 80-89 MM HG: ICD-10-PCS | Mod: CPTII,S$GLB,, | Performed by: INTERNAL MEDICINE

## 2020-05-22 PROCEDURE — 1101F PT FALLS ASSESS-DOCD LE1/YR: CPT | Mod: CPTII,S$GLB,, | Performed by: INTERNAL MEDICINE

## 2020-05-22 PROCEDURE — 99214 OFFICE O/P EST MOD 30 MIN: CPT | Mod: S$GLB,,, | Performed by: INTERNAL MEDICINE

## 2020-05-22 PROCEDURE — 1101F PR PT FALLS ASSESS DOC 0-1 FALLS W/OUT INJ PAST YR: ICD-10-PCS | Mod: CPTII,S$GLB,, | Performed by: INTERNAL MEDICINE

## 2020-05-22 PROCEDURE — 99999 PR PBB SHADOW E&M-EST. PATIENT-LVL III: ICD-10-PCS | Mod: PBBFAC,,, | Performed by: INTERNAL MEDICINE

## 2020-05-22 PROCEDURE — 1159F MED LIST DOCD IN RCRD: CPT | Mod: S$GLB,,, | Performed by: INTERNAL MEDICINE

## 2020-05-22 PROCEDURE — 1159F PR MEDICATION LIST DOCUMENTED IN MEDICAL RECORD: ICD-10-PCS | Mod: S$GLB,,, | Performed by: INTERNAL MEDICINE

## 2020-05-22 PROCEDURE — 99999 PR PBB SHADOW E&M-EST. PATIENT-LVL III: CPT | Mod: PBBFAC,,, | Performed by: INTERNAL MEDICINE

## 2020-05-22 PROCEDURE — 3077F PR MOST RECENT SYSTOLIC BLOOD PRESSURE >= 140 MM HG: ICD-10-PCS | Mod: CPTII,S$GLB,, | Performed by: INTERNAL MEDICINE

## 2020-05-22 RX ORDER — ATENOLOL 50 MG/1
100 TABLET ORAL DAILY
Qty: 90 TABLET | Refills: 3 | Status: SHIPPED | OUTPATIENT
Start: 2020-05-22 | End: 2020-08-13

## 2020-05-22 NOTE — PROGRESS NOTES
Subjective:    Patient ID:  Evan Garcia is a 74 y.o. male who presents for follow-up of Carotid Artery Disease      HPI 73 yo WM with hx of CP and abnormal nuclear stress test that had cath on January 6, 2020 with stent to CFX-OM. Not having anginal pains but having muscle pains he feels is related to pravachol. Also fell in the bath tub 2 months ago and still having sacral pain.    Review of Systems   Constitution: Negative for decreased appetite, fever, malaise/fatigue, weight gain and weight loss.   HENT: Negative for hearing loss and nosebleeds.    Eyes: Negative for visual disturbance.   Cardiovascular: Negative for chest pain, claudication, cyanosis, dyspnea on exertion, irregular heartbeat, leg swelling, near-syncope, orthopnea, palpitations, paroxysmal nocturnal dyspnea and syncope.   Respiratory: Negative for cough, hemoptysis, shortness of breath, sleep disturbances due to breathing, snoring and wheezing.    Endocrine: Negative for cold intolerance, heat intolerance, polydipsia and polyuria.   Hematologic/Lymphatic: Negative for adenopathy and bleeding problem. Does not bruise/bleed easily.   Skin: Negative for color change, itching, poor wound healing, rash and suspicious lesions.   Musculoskeletal: Positive for back pain, joint pain and myalgias. Negative for arthritis, falls, joint swelling, muscle cramps and muscle weakness.   Gastrointestinal: Negative for bloating, abdominal pain, change in bowel habit, constipation, flatus, heartburn, hematemesis, hematochezia, hemorrhoids, jaundice, melena, nausea and vomiting.   Genitourinary: Negative for bladder incontinence, decreased libido, frequency, hematuria, hesitancy and urgency.   Neurological: Positive for dizziness. Negative for brief paralysis, difficulty with concentration, excessive daytime sleepiness, focal weakness, headaches, light-headedness, loss of balance, numbness, vertigo and weakness.   Psychiatric/Behavioral: Negative for altered  "mental status, depression and memory loss. The patient does not have insomnia and is not nervous/anxious.    Allergic/Immunologic: Negative for environmental allergies, hives and persistent infections.        Objective:    Physical Exam   Constitutional: He is oriented to person, place, and time. He appears well-developed and well-nourished. No distress.   BP (!) 144/88 (BP Location: Left arm)   Pulse 64   Ht 5' 9" (1.753 m)   Wt 87.8 kg (193 lb 9 oz)   BMI 28.58 kg/m²      HENT:   Head: Normocephalic and atraumatic.   Eyes: Pupils are equal, round, and reactive to light. Conjunctivae and lids are normal. Right eye exhibits no discharge. No scleral icterus.   Neck: Normal range of motion. Neck supple. No JVD present. No tracheal deviation present. No thyromegaly present.   Cardiovascular: Normal rate, regular rhythm, S1 normal, S2 normal, normal heart sounds and intact distal pulses. Exam reveals no gallop and no friction rub.   No murmur heard.  Pulses:       Carotid pulses are 2+ on the right side, and 2+ on the left side.       Radial pulses are 2+ on the right side, and 2+ on the left side.        Femoral pulses are 2+ on the right side, and 2+ on the left side.       Popliteal pulses are 2+ on the right side, and 2+ on the left side.        Dorsalis pedis pulses are 2+ on the right side, and 2+ on the left side.        Posterior tibial pulses are 2+ on the right side, and 2+ on the left side.   Pulmonary/Chest: Effort normal and breath sounds normal. No respiratory distress. He has no wheezes. He has no rales. He exhibits no tenderness.   Abdominal: Soft. Bowel sounds are normal. He exhibits no distension and no mass. There is no hepatosplenomegaly or hepatomegaly. There is no tenderness. There is no rebound and no guarding.   Musculoskeletal: Normal range of motion. He exhibits no edema or tenderness.   Lymphadenopathy:     He has no cervical adenopathy.   Neurological: He is alert and oriented to person, " place, and time. He has normal reflexes. No cranial nerve deficit. Coordination normal.   Skin: Skin is warm and dry. No rash noted. He is not diaphoretic. No erythema. No pallor.   Psychiatric: He has a normal mood and affect. His speech is normal and behavior is normal. Judgment and thought content normal. Cognition and memory are normal.         Assessment:       1. Essential hypertension    2. Coronary artery disease involving native coronary artery of native heart without angina pectoris    3. Pain in the muscles         Plan:     Stop pravachol    Reduce atenolol to 50mg    Patient will call if symptoms improve or get worse     Orders Placed This Encounter   Procedures    Comprehensive metabolic panel    Lipid Panel     Follow up in about 3 months (around 8/22/2020).

## 2020-05-23 ENCOUNTER — LAB VISIT (OUTPATIENT)
Dept: LAB | Facility: HOSPITAL | Age: 75
End: 2020-05-23
Attending: INTERNAL MEDICINE
Payer: MEDICARE

## 2020-05-23 DIAGNOSIS — I10 ESSENTIAL HYPERTENSION: ICD-10-CM

## 2020-05-23 LAB
ALBUMIN SERPL BCP-MCNC: 4.5 G/DL (ref 3.5–5.2)
ALP SERPL-CCNC: 61 U/L (ref 55–135)
ALT SERPL W/O P-5'-P-CCNC: 46 U/L (ref 10–44)
ANION GAP SERPL CALC-SCNC: 9 MMOL/L (ref 8–16)
AST SERPL-CCNC: 34 U/L (ref 10–40)
BILIRUB SERPL-MCNC: 1.2 MG/DL (ref 0.1–1)
BUN SERPL-MCNC: 21 MG/DL (ref 8–23)
CALCIUM SERPL-MCNC: 9.7 MG/DL (ref 8.7–10.5)
CHLORIDE SERPL-SCNC: 104 MMOL/L (ref 95–110)
CHOLEST SERPL-MCNC: 136 MG/DL (ref 120–199)
CHOLEST/HDLC SERPL: 4.7 {RATIO} (ref 2–5)
CO2 SERPL-SCNC: 28 MMOL/L (ref 23–29)
CREAT SERPL-MCNC: 1.6 MG/DL (ref 0.5–1.4)
EST. GFR  (AFRICAN AMERICAN): 48.7 ML/MIN/1.73 M^2
EST. GFR  (NON AFRICAN AMERICAN): 42.1 ML/MIN/1.73 M^2
GLUCOSE SERPL-MCNC: 116 MG/DL (ref 70–110)
HDLC SERPL-MCNC: 29 MG/DL (ref 40–75)
HDLC SERPL: 21.3 % (ref 20–50)
LDLC SERPL CALC-MCNC: 68.2 MG/DL (ref 63–159)
NONHDLC SERPL-MCNC: 107 MG/DL
POTASSIUM SERPL-SCNC: 4.2 MMOL/L (ref 3.5–5.1)
PROT SERPL-MCNC: 7.8 G/DL (ref 6–8.4)
SODIUM SERPL-SCNC: 141 MMOL/L (ref 136–145)
TRIGL SERPL-MCNC: 194 MG/DL (ref 30–150)

## 2020-05-23 PROCEDURE — 36415 COLL VENOUS BLD VENIPUNCTURE: CPT | Mod: PO

## 2020-05-23 PROCEDURE — 80053 COMPREHEN METABOLIC PANEL: CPT

## 2020-05-23 PROCEDURE — 80061 LIPID PANEL: CPT

## 2020-05-25 ENCOUNTER — OFFICE VISIT (OUTPATIENT)
Dept: FAMILY MEDICINE | Facility: CLINIC | Age: 75
End: 2020-05-25
Payer: MEDICARE

## 2020-05-25 VITALS
HEIGHT: 69 IN | BODY MASS INDEX: 28.49 KG/M2 | SYSTOLIC BLOOD PRESSURE: 148 MMHG | OXYGEN SATURATION: 95 % | DIASTOLIC BLOOD PRESSURE: 80 MMHG | WEIGHT: 192.38 LBS | TEMPERATURE: 98 F | HEART RATE: 60 BPM

## 2020-05-25 DIAGNOSIS — I10 ESSENTIAL HYPERTENSION: Primary | ICD-10-CM

## 2020-05-25 DIAGNOSIS — I20.9 ANGINA PECTORIS: Primary | ICD-10-CM

## 2020-05-25 DIAGNOSIS — I25.10 CORONARY ARTERY DISEASE INVOLVING NATIVE CORONARY ARTERY OF NATIVE HEART WITHOUT ANGINA PECTORIS: ICD-10-CM

## 2020-05-25 DIAGNOSIS — N40.0 BENIGN NON-NODULAR PROSTATIC HYPERPLASIA WITHOUT LOWER URINARY TRACT SYMPTOMS: ICD-10-CM

## 2020-05-25 DIAGNOSIS — R42 VERTIGO: ICD-10-CM

## 2020-05-25 DIAGNOSIS — E78.1 HYPERTRIGLYCERIDEMIA: ICD-10-CM

## 2020-05-25 DIAGNOSIS — S13.9XXA NECK SPRAIN, INITIAL ENCOUNTER: ICD-10-CM

## 2020-05-25 DIAGNOSIS — H93.13 TINNITUS OF BOTH EARS: ICD-10-CM

## 2020-05-25 PROBLEM — R94.39 ABNORMAL STRESS TEST: Status: RESOLVED | Noted: 2020-01-06 | Resolved: 2020-05-25

## 2020-05-25 PROBLEM — R94.39 ABNORMAL NUCLEAR STRESS TEST: Status: RESOLVED | Noted: 2019-12-19 | Resolved: 2020-05-25

## 2020-05-25 PROCEDURE — 1125F AMNT PAIN NOTED PAIN PRSNT: CPT | Mod: S$GLB,,, | Performed by: FAMILY MEDICINE

## 2020-05-25 PROCEDURE — 3079F DIAST BP 80-89 MM HG: CPT | Mod: CPTII,S$GLB,, | Performed by: FAMILY MEDICINE

## 2020-05-25 PROCEDURE — 1125F PR PAIN SEVERITY QUANTIFIED, PAIN PRESENT: ICD-10-PCS | Mod: S$GLB,,, | Performed by: FAMILY MEDICINE

## 2020-05-25 PROCEDURE — 3077F PR MOST RECENT SYSTOLIC BLOOD PRESSURE >= 140 MM HG: ICD-10-PCS | Mod: CPTII,S$GLB,, | Performed by: FAMILY MEDICINE

## 2020-05-25 PROCEDURE — 1159F PR MEDICATION LIST DOCUMENTED IN MEDICAL RECORD: ICD-10-PCS | Mod: S$GLB,,, | Performed by: FAMILY MEDICINE

## 2020-05-25 PROCEDURE — 1159F MED LIST DOCD IN RCRD: CPT | Mod: S$GLB,,, | Performed by: FAMILY MEDICINE

## 2020-05-25 PROCEDURE — 99214 PR OFFICE/OUTPT VISIT, EST, LEVL IV, 30-39 MIN: ICD-10-PCS | Mod: S$GLB,,, | Performed by: FAMILY MEDICINE

## 2020-05-25 PROCEDURE — 1101F PR PT FALLS ASSESS DOC 0-1 FALLS W/OUT INJ PAST YR: ICD-10-PCS | Mod: CPTII,S$GLB,, | Performed by: FAMILY MEDICINE

## 2020-05-25 PROCEDURE — 99499 UNLISTED E&M SERVICE: CPT | Mod: S$GLB,,, | Performed by: FAMILY MEDICINE

## 2020-05-25 PROCEDURE — 99999 PR PBB SHADOW E&M-EST. PATIENT-LVL III: CPT | Mod: PBBFAC,,, | Performed by: FAMILY MEDICINE

## 2020-05-25 PROCEDURE — 99499 RISK ADDL DX/OHS AUDIT: ICD-10-PCS | Mod: S$GLB,,, | Performed by: FAMILY MEDICINE

## 2020-05-25 PROCEDURE — 99999 PR PBB SHADOW E&M-EST. PATIENT-LVL III: ICD-10-PCS | Mod: PBBFAC,,, | Performed by: FAMILY MEDICINE

## 2020-05-25 PROCEDURE — 99214 OFFICE O/P EST MOD 30 MIN: CPT | Mod: S$GLB,,, | Performed by: FAMILY MEDICINE

## 2020-05-25 PROCEDURE — 3079F PR MOST RECENT DIASTOLIC BLOOD PRESSURE 80-89 MM HG: ICD-10-PCS | Mod: CPTII,S$GLB,, | Performed by: FAMILY MEDICINE

## 2020-05-25 PROCEDURE — 1101F PT FALLS ASSESS-DOCD LE1/YR: CPT | Mod: CPTII,S$GLB,, | Performed by: FAMILY MEDICINE

## 2020-05-25 PROCEDURE — 3077F SYST BP >= 140 MM HG: CPT | Mod: CPTII,S$GLB,, | Performed by: FAMILY MEDICINE

## 2020-05-25 RX ORDER — ALPRAZOLAM 0.5 MG/1
0.5 TABLET ORAL 2 TIMES DAILY PRN
Qty: 45 TABLET | Refills: 5 | Status: SHIPPED | OUTPATIENT
Start: 2020-05-25 | End: 2020-11-24 | Stop reason: SDUPTHER

## 2020-05-25 RX ORDER — FINASTERIDE 5 MG/1
5 TABLET, FILM COATED ORAL DAILY
Qty: 90 TABLET | Refills: 3 | Status: SHIPPED | OUTPATIENT
Start: 2020-05-25 | End: 2021-05-24 | Stop reason: SDUPTHER

## 2020-05-25 RX ORDER — AMLODIPINE BESYLATE 10 MG/1
10 TABLET ORAL DAILY
Qty: 90 TABLET | Refills: 3 | Status: SHIPPED | OUTPATIENT
Start: 2020-05-25 | End: 2021-05-13

## 2020-05-25 RX ORDER — ASPIRIN 81 MG/1
81 TABLET ORAL DAILY
Status: ON HOLD | COMMUNITY
End: 2023-09-08

## 2020-05-25 NOTE — PROGRESS NOTES
Subjective:       Patient ID: Evan Garcia is a 74 y.o. male.    Chief Complaint: Follow-up (6 month) and Dizziness (fell 1 month ago)    HPI Comments: Here today for 6 month  f/u on chronic health issues.    HTN - tolerating amlodipine, atenolol, chlorthalidone. BPs at home within normal range.  BPH - tolerating Proscar daily; urine flow has been good  Tinnitus- using Xanax at bedtime to sleep. This is continuous.  Vertigo still happens most day when looking upwards.  H/o gallstones - c/o intermittent RUQ pains after meals  CAD s/p stent (stenting of circumflex into the 1st obtuse marginal) - asa 81mg, plavix 75mg daily  Cardiology stopped statin (pravastatin) after developing neck muscle pains  Cardiology recently lowed his atenolol to 50mg    He had a syncopal episode and injured his coccyx.        Past Medical History:   Diagnosis Date    Ankylosing spondylitis     thoracic;     BPH (benign prostatic hypertrophy)     Cholelithiases     DDD (degenerative disc disease), lumbar     treated with FILOMENA with success    Fatty liver     HTN (hypertension)     Hypertriglyceridemia     Left kidney mass     followed by urology, Dr. De Guzman    Tinnitus of both ears     he consulted with ENT; He uses Xanax PRn    Wears glasses        Past Surgical History:   Procedure Laterality Date    CORONARY ANGIOGRAPHY  1/6/2020    Procedure: ANGIOGRAM, CORONARY ARTERY;  Surgeon: Uday Tripp MD;  Location: [facility];  Service: Cardiology;;    HERNIA REPAIR      KNEE SURGERY      right; 3 separate surgeries to repair patellar fracture    stapendectomy Bilateral 1985    bilateraly    WISDOM TOOTH EXTRACTION         Allergies   Allergen Reactions    Sulfa (Sulfonamide Antibiotics) Hives and Other (See Comments)     Decreased white blood count per pt       Social History     Socioeconomic History    Marital status:      Spouse name: Not on file    Number of children: 3    Years of education: Not on file     Highest education level: Not on file   Occupational History    Occupation: retired refinery   Social Needs    Financial resource strain: Not on file    Food insecurity:     Worry: Not on file     Inability: Not on file    Transportation needs:     Medical: Not on file     Non-medical: Not on file   Tobacco Use    Smoking status: Former Smoker     Types: Cigars, Cigarettes     Start date: 4/24/2014    Smokeless tobacco: Never Used   Substance and Sexual Activity    Alcohol use: Yes     Comment: occasional    Drug use: No    Sexual activity: Not on file   Lifestyle    Physical activity:     Days per week: Not on file     Minutes per session: Not on file    Stress: Not on file   Relationships    Social connections:     Talks on phone: Not on file     Gets together: Not on file     Attends Mormonism service: Not on file     Active member of club or organization: Not on file     Attends meetings of clubs or organizations: Not on file     Relationship status: Not on file   Other Topics Concern    Not on file   Social History Narrative    Hobbies: fish, hunt        Exercise: [employer] 3 times a week with treadmill           Current Outpatient Medications on File Prior to Visit   Medication Sig Dispense Refill    acetaminophen (TYLENOL) 325 MG tablet Take 1 tablet (325 mg total) by mouth every 6 (six) hours as needed for Pain.      aspirin (ECOTRIN) 81 MG EC tablet Take 81 mg by mouth once daily.      atenoloL (TENORMIN) 50 MG tablet Take 2 tablets (100 mg total) by mouth once daily. 90 tablet 3    chlorthalidone (HYGROTEN) 25 MG Tab Take 1 tablet (25 mg total) by mouth once daily. One half tablet po daily 60 tablet 3    clopidogrel (PLAVIX) 75 mg tablet Take 1 tablet (75 mg total) by mouth once daily. 90 tablet 3    [DISCONTINUED] ALPRAZolam (XANAX) 0.5 MG tablet Take 1 tablet (0.5 mg total) by mouth nightly as needed (tinitus). 30 tablet 5    [DISCONTINUED] amLODIPine (NORVASC) 10 MG tablet Take 1  "tablet (10 mg total) by mouth once daily. 90 tablet 1    [DISCONTINUED] finasteride (PROSCAR) 5 mg tablet TAKE 1 TABLET (5 MG TOTAL) BY MOUTH ONCE DAILY. 90 tablet 3    [DISCONTINUED] meloxicam (MOBIC) 15 MG tablet Take 1 tablet (15 mg total) by mouth once daily. (Patient not taking: Reported on 5/22/2020) 30 tablet 3     No current facility-administered medications on file prior to visit.        Family History   Problem Relation Age of Onset    Aneurysm Father     Cancer Mother         stomach    Diabetes Mother     Cancer Brother         salivary     Review of Systems   Constitutional: Negative for fever, appetite change, fatigue and unexpected weight change.   HENT: Negative for ear pain, nosebleeds, congestion, sore throat, rhinorrhea, trouble swallowing, neck pain, postnasal drip, sinus pressure and ear discharge.    Respiratory: Negative for apnea, cough, chest tightness, shortness of breath and wheezing.    Cardiovascular: Negative for palpitations and leg swelling.   Gastrointestinal: Negative for nausea, vomiting, abdominal pain, diarrhea, constipation, blood in stool and abdominal distention.   Genitourinary: Negative for dysuria, urgency, frequency, hematuria, flank pain, scrotal swelling, difficulty urinating and testicular pain.   Skin: Negative for color change, rash and wound.   Neurological: Negative for dizziness, tremors, seizures, syncope, speech difficulty, weakness, light-headedness, numbness and headaches.   Hematological: Negative for adenopathy. Does not bruise/bleed easily.   Psychiatric/Behavioral: Negative for suicidal ideas, hallucinations, behavioral problems and confusion.       Objective:      BP (!) 148/80 (BP Location: Left arm, Patient Position: Sitting)   Pulse 60   Temp 97.7 °F (36.5 °C) (Oral)   Ht 5' 9" (1.753 m)   Wt 87.2 kg (192 lb 5.6 oz)   SpO2 95%   BMI 28.41 kg/m²    Physical Exam   Constitutional: He is oriented to person, place, and time. He appears " well-developed and well-nourished. He is active and cooperative.   Nose: Nose normal.   Eyelid hypertrophy bilaterally  Mouth/Throat: Oropharynx is clear and moist. No oropharyngeal exudate.   Eyes: Conjunctivae and EOM are normal. Pupils are equal, round, and reactive to light. Right eye exhibits no discharge. Left eye exhibits no discharge. No scleral icterus.   Neck: Trachea normal, normal range of motion and full passive range of motion without pain. Neck supple. Normal carotid pulses and no JVD present. Carotid bruit is not present. No tracheal deviation present. No mass and no thyromegaly present.   Cardiovascular: Normal rate, regular rhythm, S1 normal, S2 normal, normal heart sounds and intact distal pulses.  Exam reveals no gallop and no friction rub.  No murmur heard.  Pulses:       Carotid pulses are 2+ on the right side, and 2+ on the left side.       Radial pulses are 2+ on the right side, and 2+ on the left side.        Dorsalis pedis pulses are 2+ on the right side, and 2+ on the left side.   Pulmonary/Chest: Effort normal and breath sounds normal. No respiratory distress. He has no wheezes. He has no rales.   Lymphadenopathy:        Head (right side): No tonsillar adenopathy present.        Head (left side): No tonsillar adenopathy present.     He has no cervical adenopathy.   Neurological: He is alert and oriented to person, place, and time. He has normal strength. No cranial nerve deficit. Coordination normal.   Psychiatric: He has a normal mood and affect. His speech is normal and behavior is normal. Judgment and thought content normal.   Left upper trap tenderness with spasm        Results for orders placed or performed in visit on 05/23/20   Comprehensive metabolic panel   Result Value Ref Range    Sodium 141 136 - 145 mmol/L    Potassium 4.2 3.5 - 5.1 mmol/L    Chloride 104 95 - 110 mmol/L    CO2 28 23 - 29 mmol/L    Glucose 116 (H) 70 - 110 mg/dL    BUN, Bld 21 8 - 23 mg/dL    Creatinine 1.6  (H) 0.5 - 1.4 mg/dL    Calcium 9.7 8.7 - 10.5 mg/dL    Total Protein 7.8 6.0 - 8.4 g/dL    Albumin 4.5 3.5 - 5.2 g/dL    Total Bilirubin 1.2 (H) 0.1 - 1.0 mg/dL    Alkaline Phosphatase 61 55 - 135 U/L    AST 34 10 - 40 U/L    ALT 46 (H) 10 - 44 U/L    Anion Gap 9 8 - 16 mmol/L    eGFR if African American 48.7 (A) >60 mL/min/1.73 m^2    eGFR if non  42.1 (A) >60 mL/min/1.73 m^2   Lipid Panel   Result Value Ref Range    Cholesterol 136 120 - 199 mg/dL    Triglycerides 194 (H) 30 - 150 mg/dL    HDL 29 (L) 40 - 75 mg/dL    LDL Cholesterol 68.2 63.0 - 159.0 mg/dL    Hdl/Cholesterol Ratio 21.3 20.0 - 50.0 %    Total Cholesterol/HDL Ratio 4.7 2.0 - 5.0    Non-HDL Cholesterol 107 mg/dL         Assessment:       1. Essential hypertension    2. Coronary artery disease involving native coronary artery of native heart without angina pectoris    3. Vertigo    4. Hypertriglyceridemia    5. Neck sprain, initial encounter    6. Benign non-nodular prostatic hyperplasia without lower urinary tract symptoms    7. Tinnitus of both ears        Plan:       Essential hypertension  -     amLODIPine (NORVASC) 10 MG tablet; Take 1 tablet (10 mg total) by mouth once daily.  Dispense: 90 tablet; Refill: 3    Coronary artery disease involving native coronary artery of native heart without angina pectoris    Vertigo    Hypertriglyceridemia    Neck sprain, initial encounter    Benign non-nodular prostatic hyperplasia without lower urinary tract symptoms  -     finasteride (PROSCAR) 5 mg tablet; Take 1 tablet (5 mg total) by mouth once daily.  Dispense: 90 tablet; Refill: 3    Tinnitus of both ears  -     ALPRAZolam (XANAX) 0.5 MG tablet; Take 1 tablet (0.5 mg total) by mouth 2 (two) times daily as needed (tinitus).  Dispense: 45 tablet; Refill: 5         Agree with lower dose of atenolol  Continue present meds  Counseled on regular exercise, maintenance of a healthy weight, balanced diet rich in fruits/vegetables and lean protein,  and avoidance of unhealthy habits like smoking and excessive alcohol intake.  Continue low fat diet  F/u 6 months to refill xanax or PRN

## 2020-05-25 NOTE — PROGRESS NOTES
Patient, Evan Garcia (MRN #[id number]), presented with a recent Estimated Glumerular Filtration Rate (EGFR) between 30 and 45 consistent with the definition of chronic kidney disease stage 3 - moderate (ICD10 - N18.3).    eGFR if non    Date Value Ref Range Status   05/23/2020 42.1 (A) >60 mL/min/1.73 m^2 Final     Comment:     Calculation used to obtain the estimated glomerular filtration  rate (eGFR) is the CKD-EPI equation.          The patient's chronic kidney disease stage 3 was monitored, evaluated, addressed and/or treated. This addendum to the medical record is made on 05/25/2020.

## 2020-08-11 DIAGNOSIS — I10 ESSENTIAL HYPERTENSION: ICD-10-CM

## 2020-08-11 NOTE — TELEPHONE ENCOUNTER
No new care gaps identified.  Powered by [employer]. Reference number: [id number]. 8/11/2020 12:04:12 PM   CDT

## 2020-08-12 RX ORDER — ATENOLOL 50 MG/1
100 TABLET ORAL DAILY
Qty: 90 TABLET | Refills: 3 | Status: SHIPPED | OUTPATIENT
Start: 2020-08-12 | End: 2020-08-13

## 2020-08-12 NOTE — TELEPHONE ENCOUNTER
Refill Routing Note   Medication(s) are not appropriate for processing by [facility]:       - Medication is a new start (<3 months)  - Drug-Drug Interaction (Atenolol 50 mg vs 100 mg)     Medication-related problems identified: Dose adjustment  Medication Therapy Plan: CDMR.  Dose was reduced per cardiology to atenolol 50 mg from 100 mg; unfortunately 100 mg was sent to pharmacy; I have adjusted the dose to reflect 50 mg daily; please review   Medication reconciliation completed: No      Automatic Epic Generated Protocol Data:    Orders Placed This Encounter    atenoloL (TENORMIN) 50 MG tablet      Requested Prescriptions   Signed Prescriptions Disp Refills    atenoloL (TENORMIN) 50 MG tablet 90 tablet 3     Sig: Take 2 tablets (100 mg total) by mouth once daily.       Cardiovascular:  Beta Blockers Failed - 8/12/2020  2:43 PM        Failed - Last BP in normal range within 360 days.     BP Readings from Last 3 Encounters:   05/25/20 (!) 148/80   05/22/20 (!) 144/88   01/30/20 (!) 171/86              Passed - Patient is at least 18 years old        Passed - Last Heart Rate in normal range within 360 days.     Pulse Readings from Last 3 Encounters:   05/25/20 60   05/22/20 64   01/30/20 57             Passed - Office visit in past 12 months or future 90 days.     Recent Outpatient Visits            2 months ago Essential hypertension    [facility] Zion Yao MD    2 months ago Essential hypertension    [facility] Kenny Chandra Jr., MD    4 months ago Injury of coccyx, subsequent encounter    [facility] Jyoti Tobar NP    6 months ago Coronary artery disease, angina presence unspecified, unspecified vessel or lesion type, unspecified whether native or transplanted heart    [facility] Kenny Chandra Jr., MD    7 months ago Family history of abdominal aortic aneurysm    [facility] Kenny Chandra Jr., MD           Future Appointments              In 3 months MD [facility] - [facility] [place]    In 3 months MD [facility] Matthew Jr. - Cardiology, [place]                      Appointments  past 12m or future 3m with PCP    Date Provider   Last Visit   5/25/2020 Zion Yao MD   Next Visit   11/24/2020 Zion Yao MD   ED visits in past 90 days: [unfilled]     Note composed:4:23 PM 08/12/2020

## 2020-08-13 DIAGNOSIS — I10 ESSENTIAL HYPERTENSION: Primary | ICD-10-CM

## 2020-08-13 RX ORDER — CHLORTHALIDONE 25 MG/1
TABLET ORAL
Qty: 30 TABLET | Refills: 3 | Status: SHIPPED | OUTPATIENT
Start: 2020-08-13 | End: 2020-11-24 | Stop reason: SDUPTHER

## 2020-08-13 RX ORDER — ATENOLOL 50 MG/1
50 TABLET ORAL DAILY
Qty: 90 TABLET | Refills: 3 | Status: SHIPPED | OUTPATIENT
Start: 2020-08-13 | End: 2021-11-24 | Stop reason: SDUPTHER

## 2020-08-13 NOTE — TELEPHONE ENCOUNTER
----- Message from Farheen Guerrero sent at 8/13/2020 12:36 PM CDT -----  Regarding: advice  Contact: patient  Type:  Patient Returning Call    Who Called:  self  Who Left Message for Patient:  not sure  Does the patient know what this is regarding?:  yes  Best Call Back Number:  [phone] (home)   Additional Information:  Patient states it was regarding his medication being changed. Please call patient. Thanks!

## 2020-08-14 ENCOUNTER — OFFICE VISIT (OUTPATIENT)
Dept: CARDIOLOGY | Facility: CLINIC | Age: 75
End: 2020-08-14
Payer: MEDICARE

## 2020-08-14 ENCOUNTER — TELEPHONE (OUTPATIENT)
Dept: CARDIOLOGY | Facility: CLINIC | Age: 75
End: 2020-08-14

## 2020-08-14 VITALS
BODY MASS INDEX: 28.93 KG/M2 | SYSTOLIC BLOOD PRESSURE: 172 MMHG | DIASTOLIC BLOOD PRESSURE: 88 MMHG | WEIGHT: 195.31 LBS | HEART RATE: 67 BPM | HEIGHT: 69 IN

## 2020-08-14 DIAGNOSIS — I10 ESSENTIAL HYPERTENSION: ICD-10-CM

## 2020-08-14 DIAGNOSIS — R07.9 CHEST PAIN, UNSPECIFIED TYPE: Primary | ICD-10-CM

## 2020-08-14 DIAGNOSIS — I25.10 CORONARY ARTERY DISEASE INVOLVING NATIVE CORONARY ARTERY OF NATIVE HEART WITHOUT ANGINA PECTORIS: ICD-10-CM

## 2020-08-14 PROCEDURE — 1159F MED LIST DOCD IN RCRD: CPT | Mod: S$GLB,,, | Performed by: PHYSICIAN ASSISTANT

## 2020-08-14 PROCEDURE — 1101F PR PT FALLS ASSESS DOC 0-1 FALLS W/OUT INJ PAST YR: ICD-10-PCS | Mod: CPTII,S$GLB,, | Performed by: PHYSICIAN ASSISTANT

## 2020-08-14 PROCEDURE — 3008F BODY MASS INDEX DOCD: CPT | Mod: CPTII,S$GLB,, | Performed by: PHYSICIAN ASSISTANT

## 2020-08-14 PROCEDURE — 1159F PR MEDICATION LIST DOCUMENTED IN MEDICAL RECORD: ICD-10-PCS | Mod: S$GLB,,, | Performed by: PHYSICIAN ASSISTANT

## 2020-08-14 PROCEDURE — 3077F PR MOST RECENT SYSTOLIC BLOOD PRESSURE >= 140 MM HG: ICD-10-PCS | Mod: CPTII,S$GLB,, | Performed by: PHYSICIAN ASSISTANT

## 2020-08-14 PROCEDURE — 3079F DIAST BP 80-89 MM HG: CPT | Mod: CPTII,S$GLB,, | Performed by: PHYSICIAN ASSISTANT

## 2020-08-14 PROCEDURE — 99999 PR PBB SHADOW E&M-EST. PATIENT-LVL III: ICD-10-PCS | Mod: PBBFAC,,, | Performed by: PHYSICIAN ASSISTANT

## 2020-08-14 PROCEDURE — 99999 PR PBB SHADOW E&M-EST. PATIENT-LVL III: CPT | Mod: PBBFAC,,, | Performed by: PHYSICIAN ASSISTANT

## 2020-08-14 PROCEDURE — 1101F PT FALLS ASSESS-DOCD LE1/YR: CPT | Mod: CPTII,S$GLB,, | Performed by: PHYSICIAN ASSISTANT

## 2020-08-14 PROCEDURE — 1126F PR PAIN SEVERITY QUANTIFIED, NO PAIN PRESENT: ICD-10-PCS | Mod: S$GLB,,, | Performed by: PHYSICIAN ASSISTANT

## 2020-08-14 PROCEDURE — 3008F PR BODY MASS INDEX (BMI) DOCUMENTED: ICD-10-PCS | Mod: CPTII,S$GLB,, | Performed by: PHYSICIAN ASSISTANT

## 2020-08-14 PROCEDURE — 99214 OFFICE O/P EST MOD 30 MIN: CPT | Mod: S$GLB,,, | Performed by: PHYSICIAN ASSISTANT

## 2020-08-14 PROCEDURE — 99499 RISK ADDL DX/OHS AUDIT: ICD-10-PCS | Mod: S$GLB,,, | Performed by: PHYSICIAN ASSISTANT

## 2020-08-14 PROCEDURE — 1126F AMNT PAIN NOTED NONE PRSNT: CPT | Mod: S$GLB,,, | Performed by: PHYSICIAN ASSISTANT

## 2020-08-14 PROCEDURE — 99499 UNLISTED E&M SERVICE: CPT | Mod: S$GLB,,, | Performed by: PHYSICIAN ASSISTANT

## 2020-08-14 PROCEDURE — 99214 PR OFFICE/OUTPT VISIT, EST, LEVL IV, 30-39 MIN: ICD-10-PCS | Mod: S$GLB,,, | Performed by: PHYSICIAN ASSISTANT

## 2020-08-14 PROCEDURE — 3077F SYST BP >= 140 MM HG: CPT | Mod: CPTII,S$GLB,, | Performed by: PHYSICIAN ASSISTANT

## 2020-08-14 PROCEDURE — 3079F PR MOST RECENT DIASTOLIC BLOOD PRESSURE 80-89 MM HG: ICD-10-PCS | Mod: CPTII,S$GLB,, | Performed by: PHYSICIAN ASSISTANT

## 2020-08-14 RX ORDER — ISOSORBIDE MONONITRATE 30 MG/1
30 TABLET, EXTENDED RELEASE ORAL DAILY
Qty: 30 TABLET | Refills: 11 | Status: SHIPPED | OUTPATIENT
Start: 2020-08-14 | End: 2021-05-24 | Stop reason: SDUPTHER

## 2020-08-14 NOTE — PROGRESS NOTES
Subjective:    Patient ID:  Evan Garcia is a 74 y.o. male who presents for follow-up of CAD.      HPI  Mr. Garcia is a very pleasant gentleman who follows with Dr. Chandra. He has a hx of CAD and is s/p PCI to the LCx into the OM1. He had lesions in the LAD, diag, and RCA that had negative FFR    He complains of neck pain that radiates up into his head as well as toe pain. He states the pain in his neck started after his PCI.The pain is not exertional. He has noted to alleviating factors. He carries a diagnosis of ankylosing spondylitis.    He has occasional chest pain that he describes as sharp. It has been ongoing since his stent was placed a year ago. No HYMAN or change in exercise tolerance.     Review of Systems   Constitution: Negative for chills, diaphoresis, fever, weight gain and weight loss.   HENT: Negative for sore throat.    Eyes: Negative for blurred vision, vision loss in left eye, vision loss in right eye and visual disturbance.   Cardiovascular: Negative for chest pain, claudication, dyspnea on exertion, leg swelling, near-syncope, orthopnea, palpitations, paroxysmal nocturnal dyspnea and syncope.   Respiratory: Negative for cough, hemoptysis, shortness of breath, sputum production and wheezing.    Endocrine: Negative for cold intolerance and heat intolerance.   Hematologic/Lymphatic: Negative for adenopathy. Does not bruise/bleed easily.   Skin: Negative for rash.   Musculoskeletal: Negative for falls, muscle weakness and myalgias.   Gastrointestinal: Negative for abdominal pain, change in bowel habit, constipation, diarrhea, melena and nausea.   Genitourinary: Negative for bladder incontinence.   Neurological: Negative for dizziness, focal weakness, headaches, light-headedness, numbness and weakness.   Psychiatric/Behavioral: Negative for altered mental status.         Vitals:    08/14/20 1200   BP: (!) 172/88   BP Location: Left arm   Patient Position: Sitting   BP Method: Large (Automatic)  "  Pulse: 67   Weight: 88.6 kg (195 lb 5.2 oz)   Height: 5' 9" (1.753 m)   Body mass index is 28.84 kg/m².    Objective:    Physical Exam   Constitutional: He is oriented to person, place, and time. He appears well-developed and well-nourished.   HENT:   Head: Normocephalic and atraumatic.   Eyes: Pupils are equal, round, and reactive to light. EOM are normal.   Neck: Neck supple. No JVD present. No tracheal deviation present. No thyromegaly present.   Cardiovascular: Normal rate, regular rhythm, S1 normal, S2 normal, intact distal pulses and normal pulses. PMI is not displaced. Exam reveals no gallop and no friction rub.   No murmur heard.  Pulmonary/Chest: Effort normal and breath sounds normal. No respiratory distress. He has no wheezes. He has no rales. He exhibits no tenderness.   Abdominal: Soft. Bowel sounds are normal. He exhibits no distension and no mass. There is no abdominal tenderness.   Musculoskeletal: Normal range of motion.         General: No tenderness or edema.   Neurological: He is alert and oriented to person, place, and time.   Skin: Skin is warm and dry. No rash noted.   Psychiatric: He has a normal mood and affect. His behavior is normal.         Assessment:       Problem List Items Addressed This Visit        Cardiology Problems    Coronary artery disease involving native coronary artery without angina pectoris    Essential hypertension      Other Visit Diagnoses     Chest pain, unspecified type    -  Primary    Relevant Orders    Nuclear Stress - Cardiology Interpreted           Plan:       Nuclear stress test to evaluate for ischemia.   Start Imdur 30mg daily.   F/U with PCP ASAP for lesion on back and for         "

## 2020-08-14 NOTE — TELEPHONE ENCOUNTER
----- Message from Marilia Mitchell sent at 8/14/2020  2:34 PM CDT -----  Regarding: cancelle the Stress test for monday  Contact: Evan crain  Type: Needs Medical Advice  Who Called:  Evan Hobbs Call Back Number: [phone]  Additional Information: Evan cancelled the stress test for Monday due to insurance will not approve since he had one 6 mos ago

## 2020-08-17 ENCOUNTER — HOSPITAL ENCOUNTER (OUTPATIENT)
Dept: RADIOLOGY | Facility: HOSPITAL | Age: 75
Discharge: HOME | End: 2020-08-17
Attending: NURSE PRACTITIONER
Payer: MEDICARE

## 2020-08-17 ENCOUNTER — OFFICE VISIT (OUTPATIENT)
Dept: FAMILY MEDICINE | Facility: CLINIC | Age: 75
End: 2020-08-17
Payer: MEDICARE

## 2020-08-17 VITALS
OXYGEN SATURATION: 96 % | BODY MASS INDEX: 29.12 KG/M2 | TEMPERATURE: 98 F | DIASTOLIC BLOOD PRESSURE: 78 MMHG | HEART RATE: 69 BPM | SYSTOLIC BLOOD PRESSURE: 138 MMHG | WEIGHT: 196.63 LBS | HEIGHT: 69 IN

## 2020-08-17 DIAGNOSIS — M54.2 NECK PAIN: ICD-10-CM

## 2020-08-17 DIAGNOSIS — L08.9 INFECTED CYST OF SKIN: Primary | ICD-10-CM

## 2020-08-17 DIAGNOSIS — L72.9 INFECTED CYST OF SKIN: Primary | ICD-10-CM

## 2020-08-17 PROCEDURE — 1101F PT FALLS ASSESS-DOCD LE1/YR: CPT | Mod: CPTII,S$GLB,, | Performed by: NURSE PRACTITIONER

## 2020-08-17 PROCEDURE — 3008F BODY MASS INDEX DOCD: CPT | Mod: CPTII,S$GLB,, | Performed by: NURSE PRACTITIONER

## 2020-08-17 PROCEDURE — 3075F PR MOST RECENT SYSTOLIC BLOOD PRESS GE 130-139MM HG: ICD-10-PCS | Mod: CPTII,S$GLB,, | Performed by: NURSE PRACTITIONER

## 2020-08-17 PROCEDURE — 99999 PR PBB SHADOW E&M-EST. PATIENT-LVL V: ICD-10-PCS | Mod: PBBFAC,,, | Performed by: NURSE PRACTITIONER

## 2020-08-17 PROCEDURE — 1101F PR PT FALLS ASSESS DOC 0-1 FALLS W/OUT INJ PAST YR: ICD-10-PCS | Mod: CPTII,S$GLB,, | Performed by: NURSE PRACTITIONER

## 2020-08-17 PROCEDURE — 3078F DIAST BP <80 MM HG: CPT | Mod: CPTII,S$GLB,, | Performed by: NURSE PRACTITIONER

## 2020-08-17 PROCEDURE — 99214 OFFICE O/P EST MOD 30 MIN: CPT | Mod: S$GLB,,, | Performed by: NURSE PRACTITIONER

## 2020-08-17 PROCEDURE — 3075F SYST BP GE 130 - 139MM HG: CPT | Mod: CPTII,S$GLB,, | Performed by: NURSE PRACTITIONER

## 2020-08-17 PROCEDURE — 72040 XR CERVICAL SPINE AP LATERAL: ICD-10-PCS | Mod: 26,,, | Performed by: RADIOLOGY

## 2020-08-17 PROCEDURE — 99999 PR PBB SHADOW E&M-EST. PATIENT-LVL V: CPT | Mod: PBBFAC,,, | Performed by: NURSE PRACTITIONER

## 2020-08-17 PROCEDURE — 1159F MED LIST DOCD IN RCRD: CPT | Mod: S$GLB,,, | Performed by: NURSE PRACTITIONER

## 2020-08-17 PROCEDURE — 72040 X-RAY EXAM NECK SPINE 2-3 VW: CPT | Mod: 26,,, | Performed by: RADIOLOGY

## 2020-08-17 PROCEDURE — 3078F PR MOST RECENT DIASTOLIC BLOOD PRESSURE < 80 MM HG: ICD-10-PCS | Mod: CPTII,S$GLB,, | Performed by: NURSE PRACTITIONER

## 2020-08-17 PROCEDURE — 1159F PR MEDICATION LIST DOCUMENTED IN MEDICAL RECORD: ICD-10-PCS | Mod: S$GLB,,, | Performed by: NURSE PRACTITIONER

## 2020-08-17 PROCEDURE — 3008F PR BODY MASS INDEX (BMI) DOCUMENTED: ICD-10-PCS | Mod: CPTII,S$GLB,, | Performed by: NURSE PRACTITIONER

## 2020-08-17 PROCEDURE — 99214 PR OFFICE/OUTPT VISIT, EST, LEVL IV, 30-39 MIN: ICD-10-PCS | Mod: S$GLB,,, | Performed by: NURSE PRACTITIONER

## 2020-08-17 PROCEDURE — 72040 X-RAY EXAM NECK SPINE 2-3 VW: CPT | Mod: TC,FY,PO

## 2020-08-17 RX ORDER — DOXYCYCLINE HYCLATE 100 MG
100 TABLET ORAL 2 TIMES DAILY
Qty: 20 TABLET | Refills: 0 | Status: SHIPPED | OUTPATIENT
Start: 2020-08-17 | End: 2020-11-24

## 2020-08-17 RX ORDER — HYDROCODONE BITARTRATE AND ACETAMINOPHEN 5; 325 MG/1; MG/1
1 TABLET ORAL EVERY 6 HOURS PRN
COMMUNITY
Start: 2020-07-27 | End: 2020-11-24

## 2020-08-17 RX ORDER — MELOXICAM 15 MG/1
15 TABLET ORAL DAILY
Qty: 10 TABLET | Refills: 0 | Status: SHIPPED | OUTPATIENT
Start: 2020-08-17 | End: 2020-11-24

## 2020-08-17 RX ORDER — PRAVASTATIN SODIUM 40 MG/1
40 TABLET ORAL DAILY
COMMUNITY
Start: 2020-07-20 | End: 2020-08-17 | Stop reason: SINTOL

## 2020-08-17 RX ORDER — ERYTHROMYCIN 5 MG/G
OINTMENT OPHTHALMIC
COMMUNITY
Start: 2020-07-02 | End: 2021-11-24 | Stop reason: ALTCHOICE

## 2020-08-17 NOTE — PROGRESS NOTES
Subjective:       Patient ID: Evan Garcia is a 74 y.o. male.    Chief Complaint: Recurrent Skin Infections    Patient says he has had pain in the left posterior neck for a few months, no injury he is aware of. He says that he has taken some OTc pain  relievers when it gets back, he says a hot shower and warm compresses and ice it does help it. He says it waxes and wanes, 5/10. He says sometimes his fingers tingle, but this has been ongoing. He says he does have pain and stiffness with rom.   He has an infected cyst on his right posterior shoulder. Seen at urgent care on 8/8/2020, treated with keflex, taking as directed. Has improved significantly in size, but still some area of redness and induration.     Past Medical History:  No date: Ankylosing spondylitis      Comment:  thoracic;   No date: BPH (benign prostatic hypertrophy)  No date: Cholelithiases  No date: DDD (degenerative disc disease), lumbar      Comment:  treated with FILOMENA with success  No date: Fatty liver  No date: HTN (hypertension)  No date: Hypertriglyceridemia  No date: Left kidney mass      Comment:  followed by urology, Dr. De Guzman  No date: Tinnitus of both ears      Comment:  he consulted with ENT; He uses Xanax PRn  No date: Wears glasses    Past Surgical History:  1/6/2020: CORONARY ANGIOGRAPHY      Comment:  Procedure: ANGIOGRAM, CORONARY ARTERY;  Surgeon: Uday Tripp MD;  Location: [facility];  Service: Cardiology;;  No date: HERNIA REPAIR  No date: KNEE SURGERY      Comment:  right; 3 separate surgeries to repair patellar fracture  1985: stapendectomy; Bilateral      Comment:  bilateraly  No date: WISDOM TOOTH EXTRACTION    Review of patient's family history indicates:  Problem: Aneurysm      Relation: Father          Age of Onset: (Not Specified)  Problem: Cancer      Relation: Mother          Age of Onset: (Not Specified)          Comment: stomach  Problem: Diabetes      Relation: Mother          Age of Onset: (Not  Specified)  Problem: Cancer      Relation: Brother          Age of Onset: (Not Specified)          Comment: salivary      Social History    Socioeconomic History      Marital status:       Spouse name: Not on file      Number of children: 3      Years of education: Not on file      Highest education level: Not on file    Occupational History      Occupation: retired refinery    Social Needs      Financial resource strain: Not on file      Food insecurity        Worry: Not on file        Inability: Not on file      Transportation needs        Medical: Not on file        Non-medical: Not on file    Tobacco Use      Smoking status: Former Smoker        Types: Cigars, Cigarettes        Start date: 4/24/2014      Smokeless tobacco: Never Used    Substance and Sexual Activity      Alcohol use: Yes        Comment: occasional      Drug use: No      Sexual activity: Not on file    Lifestyle      Physical activity        Days per week: Not on file        Minutes per session: Not on file      Stress: Not on file    Relationships      Social connections        Talks on phone: Not on file        Gets together: Not on file        Attends Zoroastrian service: Not on file        Active member of club or organization: Not on file        Attends meetings of clubs or organizations: Not on file        Relationship status: Not on file    Other Topics      Concerns:        Not on file    Social History Narrative      Hobbies: fish, hunt            Exercise: [employer] 3 times a week with treadmill            Current Outpatient Medications:  acetaminophen (TYLENOL) 325 MG tablet, Take 1 tablet (325 mg total) by mouth every 6 (six) hours as needed for Pain., Disp: , Rfl:   ALPRAZolam (XANAX) 0.5 MG tablet, Take 1 tablet (0.5 mg total) by mouth 2 (two) times daily as needed (tinitus)., Disp: 45 tablet, Rfl: 5  amLODIPine (NORVASC) 10 MG tablet, Take 1 tablet (10 mg total) by mouth once daily., Disp: 90 tablet, Rfl: 3  aspirin (ECOTRIN)  81 MG EC tablet, Take 81 mg by mouth once daily., Disp: , Rfl:   atenoloL (TENORMIN) 50 MG tablet, Take 1 tablet (50 mg total) by mouth once daily., Disp: 90 tablet, Rfl: 3  chlorthalidone (HYGROTEN) 25 MG Tab, One-half tablet po daily, Disp: 30 tablet, Rfl: 3  clopidogrel (PLAVIX) 75 mg tablet, Take 1 tablet (75 mg total) by mouth once daily., Disp: 90 tablet, Rfl: 3  erythromycin (ROMYCIN) ophthalmic ointment, PLEASE SEE ATTACHED FOR DETAILED DIRECTIONS, Disp: , Rfl:   finasteride (PROSCAR) 5 mg tablet, Take 1 tablet (5 mg total) by mouth once daily., Disp: 90 tablet, Rfl: 3  HYDROcodone-acetaminophen (NORCO) 5-325 mg per tablet, Take 1 tablet by mouth every 6 (six) hours as needed. FOR PAIN, Disp: , Rfl:   isosorbide mononitrate (IMDUR) 30 MG 24 hr tablet, Take 1 tablet (30 mg total) by mouth once daily., Disp: 30 tablet, Rfl: 11  pravastatin (PRAVACHOL) 40 MG tablet, Take 40 mg by mouth once daily., Disp: , Rfl:     No current facility-administered medications for this visit.       Review of patient's allergies indicates:   -- Sulfa (sulfonamide antibiotics) -- Hives and Other (See Comments)    --  Decreased white blood count per pt    Review of Systems   Constitutional: Negative.  Negative for chills and fever.   Respiratory: Negative for cough and shortness of breath.    Cardiovascular: Negative for chest pain and claudication.   Genitourinary: Negative.    Musculoskeletal: Positive for arthralgias, neck pain and neck stiffness.   Integumentary:  Positive for color change and wound.   Neurological: Positive for numbness. Negative for dizziness.         Objective:      Physical Exam  Neck:      Musculoskeletal: Decreased range of motion. Pain with movement and muscular tenderness present. No neck rigidity.   Skin:               Assessment:       1. Infected cyst of skin    2. Neck pain        Plan:       1. Infected cyst of skin  Warm compresses, follow up with surgeon if does not completely resolve, follow up  immediately for new or worsening.   - doxycycline (VIBRA-TABS) 100 MG tablet; Take 1 tablet (100 mg total) by mouth 2 (two) times daily.  Dispense: 20 tablet; Refill: 0  - Ambulatory referral/consult to General Surgery; Future    2. Neck pain  Follow up if not resolving.   - X-Ray Cervical Spine AP And Lateral; Future  - meloxicam (MOBIC) 15 MG tablet; Take 1 tablet (15 mg total) by mouth once daily.  Dispense: 10 tablet; Refill: 0

## 2020-08-21 ENCOUNTER — LAB VISIT (OUTPATIENT)
Dept: LAB | Facility: HOSPITAL | Age: 75
End: 2020-08-21
Attending: INTERNAL MEDICINE
Payer: MEDICARE

## 2020-08-21 ENCOUNTER — TELEPHONE (OUTPATIENT)
Dept: FAMILY MEDICINE | Facility: CLINIC | Age: 75
End: 2020-08-21

## 2020-08-21 DIAGNOSIS — I10 ESSENTIAL HYPERTENSION: ICD-10-CM

## 2020-08-21 DIAGNOSIS — M48.10 DISH (DIFFUSE IDIOPATHIC SKELETAL HYPEROSTOSIS): Primary | ICD-10-CM

## 2020-08-21 LAB
ANION GAP SERPL CALC-SCNC: 10 MMOL/L (ref 8–16)
BUN SERPL-MCNC: 21 MG/DL (ref 8–23)
CALCIUM SERPL-MCNC: 9.8 MG/DL (ref 8.7–10.5)
CHLORIDE SERPL-SCNC: 108 MMOL/L (ref 95–110)
CO2 SERPL-SCNC: 28 MMOL/L (ref 23–29)
CREAT SERPL-MCNC: 1.2 MG/DL (ref 0.5–1.4)
EST. GFR  (AFRICAN AMERICAN): >60 ML/MIN/1.73 M^2
EST. GFR  (NON AFRICAN AMERICAN): 59.6 ML/MIN/1.73 M^2
GLUCOSE SERPL-MCNC: 120 MG/DL (ref 70–110)
POTASSIUM SERPL-SCNC: 3.9 MMOL/L (ref 3.5–5.1)
SODIUM SERPL-SCNC: 146 MMOL/L (ref 136–145)

## 2020-08-21 PROCEDURE — 80048 BASIC METABOLIC PNL TOTAL CA: CPT

## 2020-08-21 PROCEDURE — 36415 COLL VENOUS BLD VENIPUNCTURE: CPT | Mod: PO

## 2020-08-21 NOTE — TELEPHONE ENCOUNTER
Spoke with patient .States he is not having any trouble breathing or swallowing. He would like to go ahead and see the spine specialist.

## 2020-08-21 NOTE — TELEPHONE ENCOUNTER
----- Message from Fabby Hilton sent at 8/21/2020 10:04 AM CDT -----  Patient is returning a phone call.  Who left a message for the patient: He doesn't know   Does patient know what this is regarding:  No

## 2020-09-01 ENCOUNTER — PATIENT OUTREACH (OUTPATIENT)
Dept: ADMINISTRATIVE | Facility: OTHER | Age: 75
End: 2020-09-01

## 2020-09-01 ENCOUNTER — TELEPHONE (OUTPATIENT)
Dept: SPINE | Facility: CLINIC | Age: 75
End: 2020-09-01

## 2020-09-01 ENCOUNTER — OFFICE VISIT (OUTPATIENT)
Dept: SPINE | Facility: CLINIC | Age: 75
End: 2020-09-01
Payer: MEDICARE

## 2020-09-01 VITALS
WEIGHT: 196.88 LBS | HEIGHT: 69 IN | HEART RATE: 61 BPM | DIASTOLIC BLOOD PRESSURE: 82 MMHG | BODY MASS INDEX: 29.16 KG/M2 | SYSTOLIC BLOOD PRESSURE: 152 MMHG

## 2020-09-01 DIAGNOSIS — M53.3 SACRAL PAIN: ICD-10-CM

## 2020-09-01 DIAGNOSIS — M48.10 DISH (DIFFUSE IDIOPATHIC SKELETAL HYPEROSTOSIS): ICD-10-CM

## 2020-09-01 DIAGNOSIS — M54.2 CERVICALGIA: Primary | ICD-10-CM

## 2020-09-01 PROCEDURE — 3077F SYST BP >= 140 MM HG: CPT | Mod: CPTII,S$GLB,, | Performed by: PHYSICIAN ASSISTANT

## 2020-09-01 PROCEDURE — 99999 PR PBB SHADOW E&M-EST. PATIENT-LVL V: ICD-10-PCS | Mod: PBBFAC,,, | Performed by: PHYSICIAN ASSISTANT

## 2020-09-01 PROCEDURE — 1159F PR MEDICATION LIST DOCUMENTED IN MEDICAL RECORD: ICD-10-PCS | Mod: S$GLB,,, | Performed by: PHYSICIAN ASSISTANT

## 2020-09-01 PROCEDURE — 1125F AMNT PAIN NOTED PAIN PRSNT: CPT | Mod: S$GLB,,, | Performed by: PHYSICIAN ASSISTANT

## 2020-09-01 PROCEDURE — 3077F PR MOST RECENT SYSTOLIC BLOOD PRESSURE >= 140 MM HG: ICD-10-PCS | Mod: CPTII,S$GLB,, | Performed by: PHYSICIAN ASSISTANT

## 2020-09-01 PROCEDURE — 99999 PR PBB SHADOW E&M-EST. PATIENT-LVL V: CPT | Mod: PBBFAC,,, | Performed by: PHYSICIAN ASSISTANT

## 2020-09-01 PROCEDURE — 1101F PT FALLS ASSESS-DOCD LE1/YR: CPT | Mod: CPTII,S$GLB,, | Performed by: PHYSICIAN ASSISTANT

## 2020-09-01 PROCEDURE — 99203 PR OFFICE/OUTPT VISIT, NEW, LEVL III, 30-44 MIN: ICD-10-PCS | Mod: S$GLB,,, | Performed by: PHYSICIAN ASSISTANT

## 2020-09-01 PROCEDURE — 3008F BODY MASS INDEX DOCD: CPT | Mod: CPTII,S$GLB,, | Performed by: PHYSICIAN ASSISTANT

## 2020-09-01 PROCEDURE — 1101F PR PT FALLS ASSESS DOC 0-1 FALLS W/OUT INJ PAST YR: ICD-10-PCS | Mod: CPTII,S$GLB,, | Performed by: PHYSICIAN ASSISTANT

## 2020-09-01 PROCEDURE — 99203 OFFICE O/P NEW LOW 30 MIN: CPT | Mod: S$GLB,,, | Performed by: PHYSICIAN ASSISTANT

## 2020-09-01 PROCEDURE — 3079F PR MOST RECENT DIASTOLIC BLOOD PRESSURE 80-89 MM HG: ICD-10-PCS | Mod: CPTII,S$GLB,, | Performed by: PHYSICIAN ASSISTANT

## 2020-09-01 PROCEDURE — 3008F PR BODY MASS INDEX (BMI) DOCUMENTED: ICD-10-PCS | Mod: CPTII,S$GLB,, | Performed by: PHYSICIAN ASSISTANT

## 2020-09-01 PROCEDURE — 1159F MED LIST DOCD IN RCRD: CPT | Mod: S$GLB,,, | Performed by: PHYSICIAN ASSISTANT

## 2020-09-01 PROCEDURE — 1125F PR PAIN SEVERITY QUANTIFIED, PAIN PRESENT: ICD-10-PCS | Mod: S$GLB,,, | Performed by: PHYSICIAN ASSISTANT

## 2020-09-01 PROCEDURE — 3079F DIAST BP 80-89 MM HG: CPT | Mod: CPTII,S$GLB,, | Performed by: PHYSICIAN ASSISTANT

## 2020-09-01 NOTE — PROGRESS NOTES
Health Maintenance Due   Topic Date Due    Shingles Vaccine (2 of 3) 07/13/2015    Influenza Vaccine (1) 09/01/2020     Updates were requested from care everywhere.  Chart was reviewed for overdue Proactive [facility] Encounters (CAR[facility]) topics (CRS, Breast Cancer Screening, Eye exam)  Health Maintenance has been updated.  LINKS immunization registry triggered.  Immunizations were reconciled.

## 2020-09-01 NOTE — TELEPHONE ENCOUNTER
Spoke with patient and scheduled him for an x-ray and an appointment with Dr. Newman, he indicated understanding.

## 2020-09-01 NOTE — TELEPHONE ENCOUNTER
----- Message from Nelsy Dennis PA-C sent at 9/1/2020  1:20 PM CDT -----  Please call patient and arrange:Xray sacrum and coccyxAppt with Dr. Newman to discuss ganglion impar blockMake sure rheumatology appt is scheduled.Thanks.

## 2020-09-01 NOTE — PROGRESS NOTES
Back and Spine Consult    Patient ID: Evan Garcia is a 74 y.o. male.    Chief Complaint   Patient presents with    Neck Pain       Review of Systems   Constitutional: Negative for activity change, chills, fatigue and unexpected weight change.   HENT: Negative for hearing loss, tinnitus, trouble swallowing and voice change.    Eyes: Negative for visual disturbance.   Respiratory: Negative for apnea, chest tightness and shortness of breath.    Cardiovascular: Negative for chest pain and palpitations.   Gastrointestinal: Negative for abdominal pain, constipation, diarrhea, nausea and vomiting.   Genitourinary: Negative for difficulty urinating, dysuria and frequency.   Musculoskeletal: Negative for back pain, gait problem, neck pain and neck stiffness.   Skin: Negative for wound.   Neurological: Negative for dizziness, tremors, seizures, facial asymmetry, speech difficulty, weakness, light-headedness, numbness and headaches.   Psychiatric/Behavioral: Negative for confusion and decreased concentration.       Past Medical History:   Diagnosis Date    Ankylosing spondylitis     thoracic;     BPH (benign prostatic hypertrophy)     Cholelithiases     DDD (degenerative disc disease), lumbar     treated with FILOMENA with success    Fatty liver     HTN (hypertension)     Hypertriglyceridemia     Left kidney mass     followed by urology, Dr. De Guzman    Tinnitus of both ears     he consulted with ENT; He uses Xanax PRn    Wears glasses      Social History     Socioeconomic History    Marital status:      Spouse name: Not on file    Number of children: 3    Years of education: Not on file    Highest education level: Not on file   Occupational History    Occupation: retired refinery   Social Needs    Financial resource strain: Not on file    Food insecurity     Worry: Not on file     Inability: Not on file    Transportation needs     Medical: Not on file     Non-medical: Not on file   Tobacco Use     Smoking status: Former Smoker     Types: Cigars, Cigarettes     Start date: 4/24/2014    Smokeless tobacco: Never Used   Substance and Sexual Activity    Alcohol use: Yes     Comment: occasional    Drug use: No    Sexual activity: Not on file   Lifestyle    Physical activity     Days per week: Not on file     Minutes per session: Not on file    Stress: Not on file   Relationships    Social connections     Talks on phone: Not on file     Gets together: Not on file     Attends Oriental orthodox service: Not on file     Active member of club or organization: Not on file     Attends meetings of clubs or organizations: Not on file     Relationship status: Not on file   Other Topics Concern    Not on file   Social History Narrative    Hobbies: fish, hunt        Exercise: [employer] 3 times a week with treadmill         Family History   Problem Relation Age of Onset    Aneurysm Father     Cancer Mother         stomach    Diabetes Mother     Cancer Brother         salivary     Review of patient's allergies indicates:   Allergen Reactions    Sulfa (sulfonamide antibiotics) Hives and Other (See Comments)     Decreased white blood count per pt       Current Outpatient Medications:     acetaminophen (TYLENOL) 325 MG tablet, Take 1 tablet (325 mg total) by mouth every 6 (six) hours as needed for Pain., Disp: , Rfl:     ALPRAZolam (XANAX) 0.5 MG tablet, Take 1 tablet (0.5 mg total) by mouth 2 (two) times daily as needed (tinitus)., Disp: 45 tablet, Rfl: 5    amLODIPine (NORVASC) 10 MG tablet, Take 1 tablet (10 mg total) by mouth once daily., Disp: 90 tablet, Rfl: 3    aspirin (ECOTRIN) 81 MG EC tablet, Take 81 mg by mouth once daily., Disp: , Rfl:     atenoloL (TENORMIN) 50 MG tablet, Take 1 tablet (50 mg total) by mouth once daily., Disp: 90 tablet, Rfl: 3    chlorthalidone (HYGROTEN) 25 MG Tab, One-half tablet po daily, Disp: 30 tablet, Rfl: 3    clopidogrel (PLAVIX) 75 mg tablet, Take 1 tablet (75 mg total) by mouth  "once daily., Disp: 90 tablet, Rfl: 3    erythromycin (ROMYCIN) ophthalmic ointment, PLEASE SEE ATTACHED FOR DETAILED DIRECTIONS, Disp: , Rfl:     finasteride (PROSCAR) 5 mg tablet, Take 1 tablet (5 mg total) by mouth once daily., Disp: 90 tablet, Rfl: 3    HYDROcodone-acetaminophen (NORCO) 5-325 mg per tablet, Take 1 tablet by mouth every 6 (six) hours as needed. FOR PAIN, Disp: , Rfl:     isosorbide mononitrate (IMDUR) 30 MG 24 hr tablet, Take 1 tablet (30 mg total) by mouth once daily., Disp: 30 tablet, Rfl: 11    meloxicam (MOBIC) 15 MG tablet, Take 1 tablet (15 mg total) by mouth once daily., Disp: 10 tablet, Rfl: 0    doxycycline (VIBRA-TABS) 100 MG tablet, Take 1 tablet (100 mg total) by mouth 2 (two) times daily. (Patient not taking: Reported on 9/1/2020), Disp: 20 tablet, Rfl: 0    Vitals:    09/01/20 1053   BP: (!) 152/82   BP Location: Right arm   Patient Position: Sitting   BP Method: Medium (Automatic)   Pulse: 61   Weight: 89.3 kg (196 lb 13.9 oz)   Height: 5' 9" (1.753 m)       Physical Exam  Vitals signs and nursing note reviewed.   Constitutional:       Appearance: He is well-developed.   HENT:      Head: Normocephalic and atraumatic.   Eyes:      Pupils: Pupils are equal, round, and reactive to light.   Neck:      Musculoskeletal: Normal range of motion and neck supple.   Cardiovascular:      Rate and Rhythm: Normal rate.   Pulmonary:      Effort: Pulmonary effort is normal.   Abdominal:      General: There is no distension.   Musculoskeletal: Normal range of motion.   Skin:     General: Skin is warm and dry.   Neurological:      Mental Status: He is alert and oriented to person, place, and time.      Coordination: Finger-Nose-Finger Test, Heel to Shin Test and Romberg Test normal.      Gait: Gait is intact. Tandem walk normal.      Deep Tendon Reflexes:      Reflex Scores:       Tricep reflexes are 2+ on the right side and 2+ on the left side.       Bicep reflexes are 2+ on the right side and " 2+ on the left side.       Brachioradialis reflexes are 2+ on the right side and 2+ on the left side.       Patellar reflexes are 2+ on the right side and 2+ on the left side.       Achilles reflexes are 2+ on the right side and 2+ on the left side.  Psychiatric:         Speech: Speech normal.         Behavior: Behavior normal.         Thought Content: Thought content normal.         Judgment: Judgment normal.         Neurologic Exam     Mental Status   Oriented to person, place, and time.   Oriented to person.   Oriented to place.   Oriented to time.   Follows 3 step commands.   Attention: normal. Concentration: normal.   Speech: speech is normal   Level of consciousness: alert  Knowledge: consistent with education.   Able to name object. Able to read. Able to repeat. Able to write. Normal comprehension.     Cranial Nerves     CN II   Visual acuity: normal  Right visual field deficit: none  Left visual field deficit: none     CN III, IV, VI   Pupils are equal, round, and reactive to light.  Right pupil: Size: 3 mm. Shape: regular. Reactivity: brisk. Consensual response: intact.   Left pupil: Size: 3 mm. Shape: regular. Reactivity: brisk. Consensual response: intact.   CN III: no CN III palsy  CN VI: no CN VI palsy  Nystagmus: none   Diplopia: none  Ophthalmoparesis: none  Conjugate gaze: present    CN V   Right facial sensation deficit: none  Left facial sensation deficit: none    CN VII   Right facial weakness: none  Left facial weakness: none    CN VIII   Hearing: intact    CN IX, X   CN IX normal.   CN X normal.     CN XI   Right sternocleidomastoid strength: normal  Left sternocleidomastoid strength: normal  Right trapezius strength: normal  Left trapezius strength: normal    CN XII   Fasciculations: absent  Tongue deviation: none    Motor Exam   Muscle bulk: normal  Overall muscle tone: normal  Right arm pronator drift: absent  Left arm pronator drift: absent    Strength   Right neck flexion: 5/5  Left neck  flexion: 5/5  Right neck extension: 5/5  Left neck extension: 5/5  Right deltoid: 5/5  Left deltoid: 5/5  Right biceps: 55  Left biceps:   Right triceps: 5/  Left triceps: 5/  Right wrist flexion: 5/5  Left wrist flexion: 5/  Right wrist extension: 5/  Left wrist extension: /  Right interossei: /  Left interossei: /  Right abdominals: 5/5  Left abdominals: 5  Right iliopsoas:   Left iliopsoas: 5  Right quadriceps:   Left quadriceps:   Right hamstrin/5  Left hamstrin/5  Right glutei:   Left glutei:   Right anterior tibial:   Left anterior tibial:   Right posterior tibial:   Left posterior tibial:   Right peroneal:   Left peroneal:   Right gastroc:   Left gastroc:     Sensory Exam   Right arm light touch: normal  Left arm light touch: normal  Right leg light touch: normal  Left leg light touch: normal  Right arm vibration: normal  Left arm vibration: normal  Right arm pinprick: normal  Left arm pinprick: normal    Gait, Coordination, and Reflexes     Gait  Gait: normal    Coordination   Romberg: negative  Finger to nose coordination: normal  Heel to shin coordination: normal  Tandem walking coordination: normal    Tremor   Resting tremor: absent  Intention tremor: absent  Action tremor: absent    Reflexes   Right brachioradialis: 2+  Left brachioradialis: 2+  Right biceps: 2+  Left biceps: 2+  Right triceps: 2+  Left triceps: 2+  Right patellar: 2+  Left patellar: 2+  Right achilles: 2+  Left achilles: 2+  Right Amador: absent  Left Amador: absent  Right ankle clonus: absent  Left ankle clonus: absent      Provider dictation:  74 year old male former smoker with reported ankylosing spondylitis (thoracic spine), dizziness, hypertension and CAD (on plavix) is referred by Jyoti Tobar for evaluation of neck pain and sacral pain.     He has had neck pain and stiffness for quite some time becoming progressively worse.  Pain is in the posterior neck with  radiation to the left scapula.  It varies in intensity from 2-4 up to 10 on the pain scale.  Some times he las left arm pain as well.  Denies upper extremity numbness and tingling.  Pain is limiting his neck range of motion.    6 months ago he fell in the tub landing on his buttocks/ tailbone.  He has had pain since then in the coxxyx/ sacrum area.  He has had to sit on a donut pillow to get relief.  Denies any radicular leg pain, numbness, tingling.  Denies lower extremity weakness.  Denies bowel/ bladder dysfunction.    Current medications: mobic  Medications tried:  tylenol, norco  Physical therapy: none  Interventional Procedures:  None for the neck; did have lumbar FILOMENA in the past    NDI:  10%.  PHQ:  0.    He is neurologically intact on exam with 5/5 strenght, 2+ DTR and no sensory deficits on exam.  Gait and station fluid.  Tender over the coccyx. Negative trevino's.  Range of motion limited in the neck in all directions due to pain.      X-rays cervical spine from 08/17/2020 personally reviewed.  There is extensive diffuse idiopathic skeletal hyperostosis present.  There does not appear to be ossification of the anterior longitudinal ligament.  There is no evidence of fracture.  Multilevel facet arthropathy.  Large anterior C3 osteophytes seen.    Regarding the cervical spine, neck pain and limited range of motion is associated with extensive DISH and degenerative changes.  He can continue with anti-inflammatories.  Discussed physical therapy, but he prefers not to go.  I am referring him to Rheumatology for further evaluation and management of any neck pain.  Follow-up with me as needed.  Could also referred to pain management in the future if fluid like to consider cervical injections.    Regarding sacral/coccygeal pain, I recommend obtaining an x-ray of the sacrum and coccyx to rule out fracture.  We will refer to pain management for discussion of ganglion impar block.    Follow up with me as  needed.    Visit Diagnosis:  DISH (diffuse idiopathic skeletal hyperostosis)  -     Ambulatory referral/consult to [facility]        Total time spent counseling greater than fifty percent of total visit time.  Counseling included discussion regarding imaging findings, diagnosis possibilities, treatment options, risks and benefits.   The patient had many questions regarding the options and long-term effects.

## 2020-09-01 NOTE — Clinical Note
Please call patient and arrange:  Xray sacrum and coccyx  Appt with Dr. Newman to discuss ganglion impar block  Make sure rheumatology appt is scheduled.    Thanks.

## 2020-09-01 NOTE — LETTER
September 1, 2020      Jyoti Tobar, KAVITA  [address] [facility] [address] [place] [address]           [facility]  [address] [place] [address]  Phone: [phone]  Fax: [phone]          Patient: Evan Garcia   MR Number: [id number]   YOB: 1945   Date of Visit: 9/1/2020       Dear Jyoti Tobar:    Thank you for referring Evan Garcia to me for evaluation. Attached you will find relevant portions of my assessment and plan of care.    If you have questions, please do not hesitate to call me. I look forward to following Evan Garcia along with you.    Sincerely,    Nelsy Dennis PA-C    Enclosure  CC:  No Recipients    If you would like to receive this communication electronically, please contact [email or web address] or [phone] to request more information on [employer] Link access.    For providers and/or their staff who would like to refer a patient to [facility], please contact us through our one-stop-shop provider referral line, [facility], at [phone].    If you feel you have received this communication in error or would no longer like to receive these types of communications, please e-mail [email or web address]

## 2020-09-02 ENCOUNTER — HOSPITAL ENCOUNTER (OUTPATIENT)
Dept: RADIOLOGY | Facility: HOSPITAL | Age: 75
Discharge: HOME | End: 2020-09-02
Attending: PHYSICIAN ASSISTANT
Payer: MEDICARE

## 2020-09-02 DIAGNOSIS — M53.3 SACRAL PAIN: ICD-10-CM

## 2020-09-02 PROCEDURE — 72220 X-RAY EXAM SACRUM TAILBONE: CPT | Mod: TC,FY,PO

## 2020-09-02 PROCEDURE — 72220 X-RAY EXAM SACRUM TAILBONE: CPT | Mod: 26,,, | Performed by: RADIOLOGY

## 2020-09-02 PROCEDURE — 72220 XR SACRUM AND COCCYX: ICD-10-PCS | Mod: 26,,, | Performed by: RADIOLOGY

## 2020-09-10 ENCOUNTER — TELEPHONE (OUTPATIENT)
Dept: RHEUMATOLOGY | Facility: CLINIC | Age: 75
End: 2020-09-10

## 2020-09-10 ENCOUNTER — OFFICE VISIT (OUTPATIENT)
Dept: PAIN MEDICINE | Facility: CLINIC | Age: 75
End: 2020-09-10
Payer: MEDICARE

## 2020-09-10 ENCOUNTER — TELEPHONE (OUTPATIENT)
Dept: PAIN MEDICINE | Facility: CLINIC | Age: 75
End: 2020-09-10

## 2020-09-10 VITALS
WEIGHT: 194.75 LBS | DIASTOLIC BLOOD PRESSURE: 83 MMHG | SYSTOLIC BLOOD PRESSURE: 177 MMHG | BODY MASS INDEX: 28.85 KG/M2 | HEART RATE: 66 BPM | HEIGHT: 69 IN

## 2020-09-10 DIAGNOSIS — M53.3 SACRAL PAIN: ICD-10-CM

## 2020-09-10 DIAGNOSIS — Z11.9 SCREENING EXAMINATION FOR INFECTIOUS DISEASE: ICD-10-CM

## 2020-09-10 DIAGNOSIS — M53.3 COCCYDYNIA: Primary | ICD-10-CM

## 2020-09-10 PROCEDURE — 1101F PT FALLS ASSESS-DOCD LE1/YR: CPT | Mod: CPTII,S$GLB,, | Performed by: ANESTHESIOLOGY

## 2020-09-10 PROCEDURE — 3077F SYST BP >= 140 MM HG: CPT | Mod: CPTII,S$GLB,, | Performed by: ANESTHESIOLOGY

## 2020-09-10 PROCEDURE — 99999 PR PBB SHADOW E&M-EST. PATIENT-LVL IV: ICD-10-PCS | Mod: PBBFAC,,, | Performed by: ANESTHESIOLOGY

## 2020-09-10 PROCEDURE — 1159F MED LIST DOCD IN RCRD: CPT | Mod: S$GLB,,, | Performed by: ANESTHESIOLOGY

## 2020-09-10 PROCEDURE — 1125F AMNT PAIN NOTED PAIN PRSNT: CPT | Mod: S$GLB,,, | Performed by: ANESTHESIOLOGY

## 2020-09-10 PROCEDURE — 1159F PR MEDICATION LIST DOCUMENTED IN MEDICAL RECORD: ICD-10-PCS | Mod: S$GLB,,, | Performed by: ANESTHESIOLOGY

## 2020-09-10 PROCEDURE — 3008F PR BODY MASS INDEX (BMI) DOCUMENTED: ICD-10-PCS | Mod: CPTII,S$GLB,, | Performed by: ANESTHESIOLOGY

## 2020-09-10 PROCEDURE — 1125F PR PAIN SEVERITY QUANTIFIED, PAIN PRESENT: ICD-10-PCS | Mod: S$GLB,,, | Performed by: ANESTHESIOLOGY

## 2020-09-10 PROCEDURE — 3079F DIAST BP 80-89 MM HG: CPT | Mod: CPTII,S$GLB,, | Performed by: ANESTHESIOLOGY

## 2020-09-10 PROCEDURE — 99204 PR OFFICE/OUTPT VISIT, NEW, LEVL IV, 45-59 MIN: ICD-10-PCS | Mod: S$GLB,,, | Performed by: ANESTHESIOLOGY

## 2020-09-10 PROCEDURE — 3008F BODY MASS INDEX DOCD: CPT | Mod: CPTII,S$GLB,, | Performed by: ANESTHESIOLOGY

## 2020-09-10 PROCEDURE — 1101F PR PT FALLS ASSESS DOC 0-1 FALLS W/OUT INJ PAST YR: ICD-10-PCS | Mod: CPTII,S$GLB,, | Performed by: ANESTHESIOLOGY

## 2020-09-10 PROCEDURE — 3079F PR MOST RECENT DIASTOLIC BLOOD PRESSURE 80-89 MM HG: ICD-10-PCS | Mod: CPTII,S$GLB,, | Performed by: ANESTHESIOLOGY

## 2020-09-10 PROCEDURE — 3077F PR MOST RECENT SYSTOLIC BLOOD PRESSURE >= 140 MM HG: ICD-10-PCS | Mod: CPTII,S$GLB,, | Performed by: ANESTHESIOLOGY

## 2020-09-10 PROCEDURE — 99204 OFFICE O/P NEW MOD 45 MIN: CPT | Mod: S$GLB,,, | Performed by: ANESTHESIOLOGY

## 2020-09-10 PROCEDURE — 99999 PR PBB SHADOW E&M-EST. PATIENT-LVL IV: CPT | Mod: PBBFAC,,, | Performed by: ANESTHESIOLOGY

## 2020-09-10 RX ORDER — A/SINGAPORE/GP1908/2015 IVR-180 (AN A/MICHIGAN/45/2015 (H1N1)PDM09-LIKE VIRUS, A/HONG KONG/4801/2014, NYMC X-263B (H3N2) (AN A/HONG KONG/4801/2014-LIKE VIRUS), AND B/BRISBANE/60/2008, WILD TYPE (A B/BRISBANE/60/2008-LIKE VIRUS) 15; 15; 15 UG/.5ML; UG/.5ML; UG/.5ML
INJECTION, SUSPENSION INTRAMUSCULAR
COMMUNITY
Start: 2020-09-08 | End: 2020-11-24

## 2020-09-10 NOTE — TELEPHONE ENCOUNTER
----- Message from Jyoti Regan sent at 9/10/2020 11:10 AM CDT -----  Type: Needs Medical Advice  Who Called:  [facility]ell[facility] Office   Best Call Back Number: [phone]  Additional Information: Needs to schedule a new patient appointment there is a referral in Epic already referral number [id number]-[email or web address] advise-thank you

## 2020-09-10 NOTE — LETTER
September 10, 2020      Nelsy Dennis PA-C  [address] [facility] [address]  [address]  [facility] [address]           [facility]  [address] [place] [address]  Phone: [phone]  Fax: [phone]          Patient: Evan Garcia   MR Number: [id number]   YOB: 1945   Date of Visit: 9/10/2020       Dear Nelsy Dennis:    Thank you for referring Evan Garcia to me for evaluation. Attached you will find relevant portions of my assessment and plan of care.    If you have questions, please do not hesitate to call me. I look forward to following Evan Garcia along with you.    Sincerely,    Garth Newman MD    Enclosure  CC:  No Recipients    If you would like to receive this communication electronically, please contact [email or web address] or [phone] to request more information on [employer] Link access.    For providers and/or their staff who would like to refer a patient to [facility], please contact us through our one-stop-shop provider referral line, [facility] [place], at [phone].    If you feel you have received this communication in error or would no longer like to receive these types of communications, please e-mail [email or web address]

## 2020-09-21 ENCOUNTER — TELEPHONE (OUTPATIENT)
Dept: SPINE | Facility: CLINIC | Age: 75
End: 2020-09-21

## 2020-11-05 ENCOUNTER — TELEPHONE (OUTPATIENT)
Dept: CARDIOLOGY | Facility: CLINIC | Age: 75
End: 2020-11-05

## 2020-11-05 NOTE — TELEPHONE ENCOUNTER
The pravachol was stopped because of muscle pains and he never followed up in August  To see how he was doing.

## 2020-11-05 NOTE — TELEPHONE ENCOUNTER
----- Message from Vandana Thacker MA sent at 11/5/2020  1:19 PM CST -----  Regarding: FW: advice  Contact: Kita with [employer]  Has pravastatin been cancelled?  ----- Message -----  From: Farheen Guerrero  Sent: 11/5/2020  12:11 PM CST  To: [facility] MIGUEL Jr Staff  Subject: advice                                           Type: Needs Medical Advice  Who Called:  Kita with [employer]  Symptoms (please be specific):   How long has patient had these symptoms:    Pharmacy name and phone #:    Best Call Back Number: [phone]  Additional Information: Kita needs to know if the Pravastatin has been discontinued for the patient. Please call Kita. Thanks!

## 2020-11-10 ENCOUNTER — PATIENT OUTREACH (OUTPATIENT)
Dept: ADMINISTRATIVE | Facility: HOSPITAL | Age: 75
End: 2020-11-10

## 2020-11-10 NOTE — LETTER
November 10, 2020    Evan Garcia  [address] [place] [address]             [facility]  [address]  [facility] [address]  Phone: [phone] Dear Evan Garcia    [facility] and regular health care screening is essential to maintaining good health.     Our records indicate you may be due for a Colon Cancer Screening.  A colonoscopy is a test to view the lower digestive tract (colon and rectum) to look for colon cancer.  Colorectal cancer (cancer in the colon or rectum) is a leading cause of cancer deaths in the [place]  Because colorectal cancer rarely causes symptoms in its early stages, screening for the disease is important.      If you have had a colonoscopy or colon cancer screening performed at an outside facility, please let your Primary Care Provider know when and where so the records may be requested for Zion Yao MD to review and update your chart.    Please contact your primary care provider's office at [phone] so that we may assist you with scheduling any necessary appointments    Sincerely,    Zion Yao MD and your [facility] Team

## 2020-11-10 NOTE — PROGRESS NOTES
11/10/2020 Care Everywhere updates requested and reviewed.  Immunizations reconciled. Media reports reviewed.  Duplicate HM overrides and  orders removed.  Overdue HM topic chart audit and/or requested.  Overdue lab testing linked to upcoming lab appointments if applies.    LINKS DOWN 11/10/2020   LETTER MAILED      Health Maintenance Due   Topic Date Due    Shingles Vaccine (2 of 3) 2015    Colorectal Cancer Screening  2020

## 2020-11-20 ENCOUNTER — OFFICE VISIT (OUTPATIENT)
Dept: CARDIOLOGY | Facility: CLINIC | Age: 75
End: 2020-11-20
Payer: MEDICARE

## 2020-11-20 VITALS
BODY MASS INDEX: 29.39 KG/M2 | WEIGHT: 198.44 LBS | HEART RATE: 64 BPM | SYSTOLIC BLOOD PRESSURE: 153 MMHG | DIASTOLIC BLOOD PRESSURE: 84 MMHG | HEIGHT: 69 IN

## 2020-11-20 DIAGNOSIS — I10 ESSENTIAL HYPERTENSION: Primary | ICD-10-CM

## 2020-11-20 DIAGNOSIS — I20.9 ANGINA PECTORIS: ICD-10-CM

## 2020-11-20 DIAGNOSIS — I25.118 CORONARY ARTERY DISEASE OF NATIVE ARTERY OF NATIVE HEART WITH STABLE ANGINA PECTORIS: ICD-10-CM

## 2020-11-20 DIAGNOSIS — Z78.9 STATIN INTOLERANCE: ICD-10-CM

## 2020-11-20 PROBLEM — I25.10 CORONARY ARTERY DISEASE INVOLVING NATIVE CORONARY ARTERY WITHOUT ANGINA PECTORIS: Status: RESOLVED | Noted: 2020-05-22 | Resolved: 2020-11-20

## 2020-11-20 PROBLEM — M79.10 PAIN IN THE MUSCLES: Status: RESOLVED | Noted: 2020-05-22 | Resolved: 2020-11-20

## 2020-11-20 PROCEDURE — 1159F MED LIST DOCD IN RCRD: CPT | Mod: S$GLB,,, | Performed by: INTERNAL MEDICINE

## 2020-11-20 PROCEDURE — 1125F PR PAIN SEVERITY QUANTIFIED, PAIN PRESENT: ICD-10-PCS | Mod: S$GLB,,, | Performed by: INTERNAL MEDICINE

## 2020-11-20 PROCEDURE — 3079F DIAST BP 80-89 MM HG: CPT | Mod: CPTII,S$GLB,, | Performed by: INTERNAL MEDICINE

## 2020-11-20 PROCEDURE — 99999 PR PBB SHADOW E&M-EST. PATIENT-LVL IV: CPT | Mod: PBBFAC,,, | Performed by: INTERNAL MEDICINE

## 2020-11-20 PROCEDURE — 3077F SYST BP >= 140 MM HG: CPT | Mod: CPTII,S$GLB,, | Performed by: INTERNAL MEDICINE

## 2020-11-20 PROCEDURE — 1101F PT FALLS ASSESS-DOCD LE1/YR: CPT | Mod: CPTII,S$GLB,, | Performed by: INTERNAL MEDICINE

## 2020-11-20 PROCEDURE — 1125F AMNT PAIN NOTED PAIN PRSNT: CPT | Mod: S$GLB,,, | Performed by: INTERNAL MEDICINE

## 2020-11-20 PROCEDURE — 3079F PR MOST RECENT DIASTOLIC BLOOD PRESSURE 80-89 MM HG: ICD-10-PCS | Mod: CPTII,S$GLB,, | Performed by: INTERNAL MEDICINE

## 2020-11-20 PROCEDURE — 99214 PR OFFICE/OUTPT VISIT, EST, LEVL IV, 30-39 MIN: ICD-10-PCS | Mod: S$GLB,,, | Performed by: INTERNAL MEDICINE

## 2020-11-20 PROCEDURE — 3008F BODY MASS INDEX DOCD: CPT | Mod: CPTII,S$GLB,, | Performed by: INTERNAL MEDICINE

## 2020-11-20 PROCEDURE — 3288F FALL RISK ASSESSMENT DOCD: CPT | Mod: CPTII,S$GLB,, | Performed by: INTERNAL MEDICINE

## 2020-11-20 PROCEDURE — 3008F PR BODY MASS INDEX (BMI) DOCUMENTED: ICD-10-PCS | Mod: CPTII,S$GLB,, | Performed by: INTERNAL MEDICINE

## 2020-11-20 PROCEDURE — 3077F PR MOST RECENT SYSTOLIC BLOOD PRESSURE >= 140 MM HG: ICD-10-PCS | Mod: CPTII,S$GLB,, | Performed by: INTERNAL MEDICINE

## 2020-11-20 PROCEDURE — 3288F PR FALLS RISK ASSESSMENT DOCUMENTED: ICD-10-PCS | Mod: CPTII,S$GLB,, | Performed by: INTERNAL MEDICINE

## 2020-11-20 PROCEDURE — 1101F PR PT FALLS ASSESS DOC 0-1 FALLS W/OUT INJ PAST YR: ICD-10-PCS | Mod: CPTII,S$GLB,, | Performed by: INTERNAL MEDICINE

## 2020-11-20 PROCEDURE — 1159F PR MEDICATION LIST DOCUMENTED IN MEDICAL RECORD: ICD-10-PCS | Mod: S$GLB,,, | Performed by: INTERNAL MEDICINE

## 2020-11-20 PROCEDURE — 99214 OFFICE O/P EST MOD 30 MIN: CPT | Mod: S$GLB,,, | Performed by: INTERNAL MEDICINE

## 2020-11-20 PROCEDURE — 99999 PR PBB SHADOW E&M-EST. PATIENT-LVL IV: ICD-10-PCS | Mod: PBBFAC,,, | Performed by: INTERNAL MEDICINE

## 2020-11-20 NOTE — PROGRESS NOTES
Subjective:    Patient ID:  Evan Garcia is a 75 y.o. male who presents for evaluation of Chest Pain (tightness as well)      HPI 73 yo WM with hx of CP and abnormal nuclear stress test that had cath on January 6, 2020 with stent to CFX-OM. last visit stopped atenolol because of muscle pain.States he is having more angina now.     Review of Systems   Constitution: Negative for decreased appetite, fever, malaise/fatigue, weight gain and weight loss.   HENT: Negative for hearing loss and nosebleeds.    Eyes: Negative for visual disturbance.   Cardiovascular: Positive for chest pain. Negative for claudication, cyanosis, dyspnea on exertion, irregular heartbeat, leg swelling, near-syncope, orthopnea, palpitations, paroxysmal nocturnal dyspnea and syncope.   Respiratory: Negative for cough, hemoptysis, shortness of breath, sleep disturbances due to breathing, snoring and wheezing.    Endocrine: Negative for cold intolerance, heat intolerance, polydipsia and polyuria.   Hematologic/Lymphatic: Negative for adenopathy and bleeding problem. Does not bruise/bleed easily.   Skin: Negative for color change, itching, poor wound healing, rash and suspicious lesions.   Musculoskeletal: Negative for arthritis, back pain, falls, joint pain, joint swelling, muscle cramps, muscle weakness and myalgias.   Gastrointestinal: Negative for bloating, abdominal pain, change in bowel habit, constipation, flatus, heartburn, hematemesis, hematochezia, hemorrhoids, jaundice, melena, nausea and vomiting.   Genitourinary: Negative for bladder incontinence, decreased libido, frequency, hematuria, hesitancy and urgency.   Neurological: Negative for brief paralysis, difficulty with concentration, excessive daytime sleepiness, dizziness, focal weakness, headaches, light-headedness, loss of balance, numbness, vertigo and weakness.   Psychiatric/Behavioral: Negative for altered mental status, depression and memory loss. The patient is nervous/anxious.  "The patient does not have insomnia.    Allergic/Immunologic: Negative for environmental allergies, hives and persistent infections.        Objective:    Physical Exam   Constitutional: He is oriented to person, place, and time. He appears well-developed and well-nourished. No distress.   BP (!) 153/84 (BP Location: Right arm, Patient Position: Sitting, BP Method: Large (Automatic))   Pulse 64   Ht 5' 9" (1.753 m)   Wt 90 kg (198 lb 6.6 oz)   BMI 29.30 kg/m²      HENT:   Head: Normocephalic and atraumatic.   Eyes: Pupils are equal, round, and reactive to light. Conjunctivae and lids are normal. Right eye exhibits no discharge. No scleral icterus.   Neck: Normal range of motion. Neck supple. No JVD present. No tracheal deviation present. No thyromegaly present.   Cardiovascular: Normal rate, regular rhythm, S1 normal, S2 normal, normal heart sounds and intact distal pulses. Exam reveals no gallop and no friction rub.   No murmur heard.  Pulses:       Carotid pulses are 2+ on the right side and 2+ on the left side.       Radial pulses are 2+ on the right side and 2+ on the left side.        Femoral pulses are 2+ on the right side and 2+ on the left side.       Popliteal pulses are 2+ on the right side and 2+ on the left side.        Dorsalis pedis pulses are 2+ on the right side and 2+ on the left side.        Posterior tibial pulses are 2+ on the right side and 2+ on the left side.   Pulmonary/Chest: Effort normal and breath sounds normal. No respiratory distress. He has no wheezes. He has no rales. He exhibits no tenderness.   Abdominal: Soft. Bowel sounds are normal. He exhibits no distension and no mass. There is no hepatosplenomegaly or hepatomegaly. There is no abdominal tenderness. There is no rebound and no guarding.   Musculoskeletal: Normal range of motion.         General: No tenderness or edema.   Lymphadenopathy:     He has no cervical adenopathy.   Neurological: He is alert and oriented to person, " place, and time. He has normal reflexes. No cranial nerve deficit. Coordination normal.   Skin: Skin is warm and dry. No rash noted. He is not diaphoretic. No erythema. No pallor.   Psychiatric: He has a normal mood and affect. His speech is normal and behavior is normal. Judgment and thought content normal. Cognition and memory are normal.         Assessment:       1. Essential hypertension    2. Coronary artery disease of native artery of native heart with stable angina pectoris    3. Statin intolerance    4. Angina pectoris         Plan:     Needs repeat stress test    Nuclear stress ASAP.      Orders Placed This Encounter   Procedures    US Abdominal Aorta    Nuclear Stress - Cardiology Interpreted     Follow up in about 6 months (around 5/20/2021).

## 2020-11-24 ENCOUNTER — OFFICE VISIT (OUTPATIENT)
Dept: FAMILY MEDICINE | Facility: CLINIC | Age: 75
End: 2020-11-24
Payer: MEDICARE

## 2020-11-24 VITALS
WEIGHT: 199.5 LBS | HEIGHT: 69 IN | SYSTOLIC BLOOD PRESSURE: 138 MMHG | BODY MASS INDEX: 29.55 KG/M2 | HEART RATE: 65 BPM | TEMPERATURE: 98 F | OXYGEN SATURATION: 96 % | DIASTOLIC BLOOD PRESSURE: 78 MMHG

## 2020-11-24 DIAGNOSIS — R73.09 ABNORMAL GLUCOSE: ICD-10-CM

## 2020-11-24 DIAGNOSIS — I10 ESSENTIAL HYPERTENSION: ICD-10-CM

## 2020-11-24 DIAGNOSIS — I25.10 CORONARY ARTERY DISEASE INVOLVING NATIVE CORONARY ARTERY OF NATIVE HEART WITHOUT ANGINA PECTORIS: Primary | ICD-10-CM

## 2020-11-24 DIAGNOSIS — Z12.5 PROSTATE CANCER SCREENING: ICD-10-CM

## 2020-11-24 DIAGNOSIS — H93.13 TINNITUS OF BOTH EARS: ICD-10-CM

## 2020-11-24 PROCEDURE — 1101F PT FALLS ASSESS-DOCD LE1/YR: CPT | Mod: CPTII,S$GLB,, | Performed by: FAMILY MEDICINE

## 2020-11-24 PROCEDURE — 3008F BODY MASS INDEX DOCD: CPT | Mod: CPTII,S$GLB,, | Performed by: FAMILY MEDICINE

## 2020-11-24 PROCEDURE — 1159F PR MEDICATION LIST DOCUMENTED IN MEDICAL RECORD: ICD-10-PCS | Mod: S$GLB,,, | Performed by: FAMILY MEDICINE

## 2020-11-24 PROCEDURE — 99999 PR PBB SHADOW E&M-EST. PATIENT-LVL III: ICD-10-PCS | Mod: PBBFAC,,, | Performed by: FAMILY MEDICINE

## 2020-11-24 PROCEDURE — 3288F FALL RISK ASSESSMENT DOCD: CPT | Mod: CPTII,S$GLB,, | Performed by: FAMILY MEDICINE

## 2020-11-24 PROCEDURE — 3078F DIAST BP <80 MM HG: CPT | Mod: CPTII,S$GLB,, | Performed by: FAMILY MEDICINE

## 2020-11-24 PROCEDURE — 1126F AMNT PAIN NOTED NONE PRSNT: CPT | Mod: S$GLB,,, | Performed by: FAMILY MEDICINE

## 2020-11-24 PROCEDURE — 99214 OFFICE O/P EST MOD 30 MIN: CPT | Mod: S$GLB,,, | Performed by: FAMILY MEDICINE

## 2020-11-24 PROCEDURE — 3075F SYST BP GE 130 - 139MM HG: CPT | Mod: CPTII,S$GLB,, | Performed by: FAMILY MEDICINE

## 2020-11-24 PROCEDURE — 1101F PR PT FALLS ASSESS DOC 0-1 FALLS W/OUT INJ PAST YR: ICD-10-PCS | Mod: CPTII,S$GLB,, | Performed by: FAMILY MEDICINE

## 2020-11-24 PROCEDURE — 1159F MED LIST DOCD IN RCRD: CPT | Mod: S$GLB,,, | Performed by: FAMILY MEDICINE

## 2020-11-24 PROCEDURE — 3008F PR BODY MASS INDEX (BMI) DOCUMENTED: ICD-10-PCS | Mod: CPTII,S$GLB,, | Performed by: FAMILY MEDICINE

## 2020-11-24 PROCEDURE — 3078F PR MOST RECENT DIASTOLIC BLOOD PRESSURE < 80 MM HG: ICD-10-PCS | Mod: CPTII,S$GLB,, | Performed by: FAMILY MEDICINE

## 2020-11-24 PROCEDURE — 1126F PR PAIN SEVERITY QUANTIFIED, NO PAIN PRESENT: ICD-10-PCS | Mod: S$GLB,,, | Performed by: FAMILY MEDICINE

## 2020-11-24 PROCEDURE — 3288F PR FALLS RISK ASSESSMENT DOCUMENTED: ICD-10-PCS | Mod: CPTII,S$GLB,, | Performed by: FAMILY MEDICINE

## 2020-11-24 PROCEDURE — 99214 PR OFFICE/OUTPT VISIT, EST, LEVL IV, 30-39 MIN: ICD-10-PCS | Mod: S$GLB,,, | Performed by: FAMILY MEDICINE

## 2020-11-24 PROCEDURE — 3075F PR MOST RECENT SYSTOLIC BLOOD PRESS GE 130-139MM HG: ICD-10-PCS | Mod: CPTII,S$GLB,, | Performed by: FAMILY MEDICINE

## 2020-11-24 PROCEDURE — 99999 PR PBB SHADOW E&M-EST. PATIENT-LVL III: CPT | Mod: PBBFAC,,, | Performed by: FAMILY MEDICINE

## 2020-11-24 RX ORDER — ALPRAZOLAM 0.5 MG/1
0.5 TABLET ORAL 2 TIMES DAILY PRN
Qty: 30 TABLET | Refills: 5 | Status: SHIPPED | OUTPATIENT
Start: 2020-11-24 | End: 2021-05-24 | Stop reason: SDUPTHER

## 2020-11-24 RX ORDER — CHLORTHALIDONE 25 MG/1
TABLET ORAL
Qty: 45 TABLET | Refills: 3 | Status: SHIPPED | OUTPATIENT
Start: 2020-11-24 | End: 2021-11-24 | Stop reason: SDUPTHER

## 2020-11-24 RX ORDER — CLOPIDOGREL BISULFATE 75 MG/1
75 TABLET ORAL DAILY
Qty: 90 TABLET | Refills: 3 | Status: SHIPPED | OUTPATIENT
Start: 2020-11-24 | End: 2021-11-24 | Stop reason: SDUPTHER

## 2020-11-24 NOTE — PROGRESS NOTES
Subjective:       Patient ID: Evan Garcia is a 75 y.o. male.    Chief Complaint: 6 MONTH F/U    HPI Comments: Here today for 6 month  f/u on chronic health issues.    Recently saw cardiology regaring chest pain and nuclear stress test was ordered    HTN - tolerating amlodipine, atenolol, chlorthalidone. BPs at home within normal range.  BPH - tolerating Proscar daily; urine flow has been good  Tinnitus- using Xanax at bedtime to sleep. This is continuous.  Vertigo still happens most day when looking upwards.  H/o gallstones - c/o intermittent RUQ pains after meals  CAD s/p stent (stenting of circumflex into the 1st obtuse marginal) - asa 81mg, plavix 75mg daily      Past Medical History:   Diagnosis Date    Ankylosing spondylitis     thoracic;     BPH (benign prostatic hypertrophy)     Cholelithiases     DDD (degenerative disc disease), lumbar     treated with FILOMENA with success    Fatty liver     HTN (hypertension)     Hypertriglyceridemia     Left kidney mass     followed by urology, Dr. De Guzman    Tinnitus of both ears     he consulted with ENT; He uses Xanax PRn    Wears glasses        Past Surgical History:   Procedure Laterality Date    CORONARY ANGIOGRAPHY  1/6/2020    Procedure: ANGIOGRAM, CORONARY ARTERY;  Surgeon: Uday Tripp MD;  Location: [facility] CATH;  Service: Cardiology;;    HERNIA REPAIR      KNEE SURGERY      right; 3 separate surgeries to repair patellar fracture    stapendectomy Bilateral 1985    bilateraly    WISDOM TOOTH EXTRACTION         Allergies   Allergen Reactions    Sulfa (Sulfonamide Antibiotics) Hives and Other (See Comments)     Decreased white blood count per pt       Social History     Socioeconomic History    Marital status:      Spouse name: Not on file    Number of children: 3    Years of education: Not on file    Highest education level: Not on file   Occupational History    Occupation: retired refinery   Social Needs    Financial resource strain:  Not on file    Food insecurity     Worry: Not on file     Inability: Not on file    Transportation needs     Medical: Not on file     Non-medical: Not on file   Tobacco Use    Smoking status: Former Smoker     Types: Cigars, Cigarettes     Start date: 4/24/2014    Smokeless tobacco: Never Used   Substance and Sexual Activity    Alcohol use: Yes     Comment: occasional    Drug use: No    Sexual activity: Not on file   Lifestyle    Physical activity     Days per week: Not on file     Minutes per session: Not on file    Stress: Not on file   Relationships    Social connections     Talks on phone: Not on file     Gets together: Not on file     Attends Alevism service: Not on file     Active member of club or organization: Not on file     Attends meetings of clubs or organizations: Not on file     Relationship status: Not on file   Other Topics Concern    Not on file   Social History Narrative    Hobbies: fish, hunt        Exercise: [employer] 3 times a week with treadmill           Current Outpatient Medications on File Prior to Visit   Medication Sig Dispense Refill    acetaminophen (TYLENOL) 325 MG tablet Take 1 tablet (325 mg total) by mouth every 6 (six) hours as needed for Pain.      amLODIPine (NORVASC) 10 MG tablet Take 1 tablet (10 mg total) by mouth once daily. 90 tablet 3    aspirin (ECOTRIN) 81 MG EC tablet Take 81 mg by mouth once daily.      atenoloL (TENORMIN) 50 MG tablet Take 1 tablet (50 mg total) by mouth once daily. 90 tablet 3    erythromycin (ROMYCIN) ophthalmic ointment PLEASE SEE ATTACHED FOR DETAILED DIRECTIONS      finasteride (PROSCAR) 5 mg tablet Take 1 tablet (5 mg total) by mouth once daily. 90 tablet 3    isosorbide mononitrate (IMDUR) 30 MG 24 hr tablet Take 1 tablet (30 mg total) by mouth once daily. 30 tablet 11    [DISCONTINUED] ALPRAZolam (XANAX) 0.5 MG tablet Take 1 tablet (0.5 mg total) by mouth 2 (two) times daily as needed (tinitus). 45 tablet 5     [DISCONTINUED] chlorthalidone (HYGROTEN) 25 MG Tab One-half tablet po daily 30 tablet 3    [DISCONTINUED] clopidogrel (PLAVIX) 75 mg tablet Take 1 tablet (75 mg total) by mouth once daily. 90 tablet 3    [DISCONTINUED] doxycycline (VIBRA-TABS) 100 MG tablet Take 1 tablet (100 mg total) by mouth 2 (two) times daily. 20 tablet 0    [DISCONTINUED] meloxicam (MOBIC) 15 MG tablet Take 1 tablet (15 mg total) by mouth once daily. 10 tablet 0    [DISCONTINUED] FLUAD QUAD 2020-21,65Y UP,,PF, 60 mcg (15 mcg x 4)/0.5 mL Syrg       [DISCONTINUED] HYDROcodone-acetaminophen (NORCO) 5-325 mg per tablet Take 1 tablet by mouth every 6 (six) hours as needed. FOR PAIN       No current facility-administered medications on file prior to visit.        Family History   Problem Relation Age of Onset    Aneurysm Father     Cancer Mother         stomach    Diabetes Mother     Cancer Brother         salivary     Review of Systems   Constitutional: Negative for fever, appetite change, fatigue and unexpected weight change.   HENT: Negative for ear pain, nosebleeds, congestion, sore throat, rhinorrhea, trouble swallowing, neck pain, postnasal drip, sinus pressure and ear discharge.  + dizziness  Respiratory: Negative for apnea, cough, chest tightness, shortness of breath and wheezing.    Cardiovascular: Negative for palpitations and leg swelling.   Gastrointestinal: Negative for nausea, vomiting, abdominal pain, diarrhea, constipation, blood in stool and abdominal distention.   Genitourinary: Negative for dysuria, urgency, frequency, hematuria, flank pain, scrotal swelling, difficulty urinating and testicular pain.   Skin: Negative for color change, rash and wound.   Neurological: Negative for dizziness, tremors, seizures, syncope, speech difficulty, weakness, light-headedness, numbness and headaches.   Hematological: Negative for adenopathy. Does not bruise/bleed easily.   Psychiatric/Behavioral: Negative for suicidal ideas,  "hallucinations, behavioral problems and confusion.       Objective:      /78 (BP Location: Left arm, Patient Position: Sitting)   Pulse 65   Temp 98.3 °F (36.8 °C) (Oral)   Ht 5' 9" (1.753 m)   Wt 90.5 kg (199 lb 8.3 oz)   SpO2 96%   BMI 29.46 kg/m²   Physical Exam   Constitutional: He is oriented to person, place, and time. He appears well-developed and well-nourished. He is active and cooperative.   Nose: Nose normal.   Eyelid hypertrophy bilaterally  Mouth/Throat: Oropharynx is clear and moist. No oropharyngeal exudate.   Eyes: Conjunctivae and EOM are normal. Pupils are equal, round, and reactive to light. Right eye exhibits no discharge. Left eye exhibits no discharge. No scleral icterus.   Neck: Trachea normal, normal range of motion and full passive range of motion without pain. Neck supple. Normal carotid pulses and no JVD present. Carotid bruit is not present. No tracheal deviation present. No mass and no thyromegaly present.   Cardiovascular: Normal rate, regular rhythm, S1 normal, S2 normal, normal heart sounds and intact distal pulses.  Exam reveals no gallop and no friction rub.  No murmur heard.  Pulses:       Carotid pulses are 2+ on the right side, and 2+ on the left side.       Radial pulses are 2+ on the right side, and 2+ on the left side.        Dorsalis pedis pulses are 2+ on the right side, and 2+ on the left side.   Pulmonary/Chest: Effort normal and breath sounds normal. No respiratory distress. He has no wheezes. He has no rales.   Lymphadenopathy:        Head (right side): No tonsillar adenopathy present.        Head (left side): No tonsillar adenopathy present.     He has no cervical adenopathy.   Neurological: He is alert and oriented to person, place, and time. He has normal strength. No cranial nerve deficit. Coordination normal.   Psychiatric: He has a normal mood and affect. His speech is normal and behavior is normal. Judgment and thought content normal.           Results " for orders placed or performed in visit on 08/21/20   Basic metabolic panel   Result Value Ref Range    Sodium 146 (H) 136 - 145 mmol/L    Potassium 3.9 3.5 - 5.1 mmol/L    Chloride 108 95 - 110 mmol/L    CO2 28 23 - 29 mmol/L    Glucose 120 (H) 70 - 110 mg/dL    BUN 21 8 - 23 mg/dL    Creatinine 1.2 0.5 - 1.4 mg/dL    Calcium 9.8 8.7 - 10.5 mg/dL    Anion Gap 10 8 - 16 mmol/L    eGFR if African American >60.0 >60 mL/min/1.73 m^2    eGFR if non  59.6 (A) >60 mL/min/1.73 m^2         Assessment:       1. Coronary artery disease involving native coronary artery of native heart without angina pectoris    2. Tinnitus of both ears    3. Essential hypertension    4. Prostate cancer screening    5. Abnormal glucose        Plan:       Coronary artery disease involving native coronary artery of native heart without angina pectoris  -     clopidogreL (PLAVIX) 75 mg tablet; Take 1 tablet (75 mg total) by mouth once daily.  Dispense: 90 tablet; Refill: 3    Tinnitus of both ears  -     ALPRAZolam (XANAX) 0.5 MG tablet; Take 1 tablet (0.5 mg total) by mouth 2 (two) times daily as needed (tinitus).  Dispense: 30 tablet; Refill: 5    Essential hypertension  -     chlorthalidone (HYGROTEN) 25 MG Tab; One-half tablet po daily  Dispense: 45 tablet; Refill: 3  -     CBC Auto Differential; Future; Expected date: 11/24/2020  -     Comprehensive Metabolic Panel; Future; Expected date: 11/24/2020  -     Lipid Panel; Future; Expected date: 11/24/2020    Prostate cancer screening  -     PSA, Screening; Future; Expected date: 11/24/2020    Abnormal glucose  -     Hemoglobin A1C; Future; Expected date: 11/24/2020       [place]  Labs today  Continue present meds  Counseled on regular exercise, maintenance of a healthy weight, balanced diet rich in fruits/vegetables and lean protein, and avoidance of unhealthy habits like smoking and excessive alcohol intake.  Continue low fat diet  Xanax at bedtime for tinnitus  F/u 6 months to  refill xanax or PRN

## 2020-11-25 ENCOUNTER — TELEPHONE (OUTPATIENT)
Dept: CARDIOLOGY | Facility: CLINIC | Age: 75
End: 2020-11-25

## 2020-11-25 ENCOUNTER — IMMUNIZATION (OUTPATIENT)
Dept: PHARMACY | Facility: CLINIC | Age: 75
End: 2020-11-25
Payer: MEDICARE

## 2020-11-25 ENCOUNTER — LAB VISIT (OUTPATIENT)
Dept: LAB | Facility: HOSPITAL | Age: 75
End: 2020-11-25
Attending: FAMILY MEDICINE
Payer: MEDICARE

## 2020-11-25 DIAGNOSIS — Z12.5 PROSTATE CANCER SCREENING: ICD-10-CM

## 2020-11-25 DIAGNOSIS — I10 ESSENTIAL HYPERTENSION: ICD-10-CM

## 2020-11-25 DIAGNOSIS — R73.09 ABNORMAL GLUCOSE: ICD-10-CM

## 2020-11-25 LAB
ALBUMIN SERPL BCP-MCNC: 4.1 G/DL (ref 3.5–5.2)
ALP SERPL-CCNC: 53 U/L (ref 55–135)
ALT SERPL W/O P-5'-P-CCNC: 58 U/L (ref 10–44)
ANION GAP SERPL CALC-SCNC: 10 MMOL/L (ref 8–16)
AST SERPL-CCNC: 40 U/L (ref 10–40)
BASOPHILS # BLD AUTO: 0.07 K/UL (ref 0–0.2)
BASOPHILS NFR BLD: 0.9 % (ref 0–1.9)
BILIRUB SERPL-MCNC: 0.9 MG/DL (ref 0.1–1)
BUN SERPL-MCNC: 21 MG/DL (ref 8–23)
CALCIUM SERPL-MCNC: 9 MG/DL (ref 8.7–10.5)
CHLORIDE SERPL-SCNC: 105 MMOL/L (ref 95–110)
CHOLEST SERPL-MCNC: 166 MG/DL (ref 120–199)
CHOLEST/HDLC SERPL: 6.4 {RATIO} (ref 2–5)
CO2 SERPL-SCNC: 27 MMOL/L (ref 23–29)
COMPLEXED PSA SERPL-MCNC: 1.3 NG/ML (ref 0–4)
CREAT SERPL-MCNC: 1.3 MG/DL (ref 0.5–1.4)
DIFFERENTIAL METHOD: NORMAL
EOSINOPHIL # BLD AUTO: 0.2 K/UL (ref 0–0.5)
EOSINOPHIL NFR BLD: 2.3 % (ref 0–8)
ERYTHROCYTE [DISTWIDTH] IN BLOOD BY AUTOMATED COUNT: 12.8 % (ref 11.5–14.5)
EST. GFR  (AFRICAN AMERICAN): >60 ML/MIN/1.73 M^2
EST. GFR  (NON AFRICAN AMERICAN): 53.8 ML/MIN/1.73 M^2
ESTIMATED AVG GLUCOSE: 120 MG/DL (ref 68–131)
GLUCOSE SERPL-MCNC: 127 MG/DL (ref 70–110)
HBA1C MFR BLD HPLC: 5.8 % (ref 4–5.6)
HCT VFR BLD AUTO: 51.4 % (ref 40–54)
HDLC SERPL-MCNC: 26 MG/DL (ref 40–75)
HDLC SERPL: 15.7 % (ref 20–50)
HGB BLD-MCNC: 16.6 G/DL (ref 14–18)
IMM GRANULOCYTES # BLD AUTO: 0.02 K/UL (ref 0–0.04)
IMM GRANULOCYTES NFR BLD AUTO: 0.2 % (ref 0–0.5)
LDLC SERPL CALC-MCNC: 90 MG/DL (ref 63–159)
LYMPHOCYTES # BLD AUTO: 2.3 K/UL (ref 1–4.8)
LYMPHOCYTES NFR BLD: 27.7 % (ref 18–48)
MCH RBC QN AUTO: 28.6 PG (ref 27–31)
MCHC RBC AUTO-ENTMCNC: 32.3 G/DL (ref 32–36)
MCV RBC AUTO: 89 FL (ref 82–98)
MONOCYTES # BLD AUTO: 0.7 K/UL (ref 0.3–1)
MONOCYTES NFR BLD: 8.7 % (ref 4–15)
NEUTROPHILS # BLD AUTO: 4.9 K/UL (ref 1.8–7.7)
NEUTROPHILS NFR BLD: 60.2 % (ref 38–73)
NONHDLC SERPL-MCNC: 140 MG/DL
NRBC BLD-RTO: 0 /100 WBC
PLATELET # BLD AUTO: 241 K/UL (ref 150–350)
PMV BLD AUTO: 10.7 FL (ref 9.2–12.9)
POTASSIUM SERPL-SCNC: 3.5 MMOL/L (ref 3.5–5.1)
PROT SERPL-MCNC: 7.5 G/DL (ref 6–8.4)
RBC # BLD AUTO: 5.8 M/UL (ref 4.6–6.2)
SODIUM SERPL-SCNC: 142 MMOL/L (ref 136–145)
TRIGL SERPL-MCNC: 250 MG/DL (ref 30–150)
WBC # BLD AUTO: 8.19 K/UL (ref 3.9–12.7)

## 2020-11-25 PROCEDURE — 36415 COLL VENOUS BLD VENIPUNCTURE: CPT | Mod: PO

## 2020-11-25 PROCEDURE — 80053 COMPREHEN METABOLIC PANEL: CPT

## 2020-11-25 PROCEDURE — 83036 HEMOGLOBIN GLYCOSYLATED A1C: CPT

## 2020-11-25 PROCEDURE — 84153 ASSAY OF PSA TOTAL: CPT

## 2020-11-25 PROCEDURE — 80061 LIPID PANEL: CPT

## 2020-11-25 PROCEDURE — 85025 COMPLETE CBC W/AUTO DIFF WBC: CPT

## 2020-12-21 ENCOUNTER — TELEPHONE (OUTPATIENT)
Dept: CARDIOLOGY | Facility: CLINIC | Age: 75
End: 2020-12-21

## 2020-12-21 NOTE — TELEPHONE ENCOUNTER
----- Message from Michelle Cowart sent at 12/21/2020  3:41 PM CST -----  Contact: pt  Who Called: PT  Regarding: The pt  is returning the call to the nurse and is requesting a callback.   Would the patient rather a call back or a response via [facility]? Call back  Best Call Back Number: [phone]  Additional Information:

## 2021-01-14 ENCOUNTER — IMMUNIZATION (OUTPATIENT)
Dept: PRIMARY CARE CLINIC | Facility: CLINIC | Age: 76
End: 2021-01-14
Payer: MEDICARE

## 2021-01-14 DIAGNOSIS — Z23 NEED FOR VACCINATION: ICD-10-CM

## 2021-01-14 PROCEDURE — 0001A COVID-19, MRNA, LNP-S, PF, 30 MCG/0.3 ML DOSE VACCINE: ICD-10-PCS | Mod: S$GLB,,, | Performed by: FAMILY MEDICINE

## 2021-01-14 PROCEDURE — 91300 COVID-19, MRNA, LNP-S, PF, 30 MCG/0.3 ML DOSE VACCINE: CPT | Mod: S$GLB,,, | Performed by: FAMILY MEDICINE

## 2021-01-14 PROCEDURE — 0001A COVID-19, MRNA, LNP-S, PF, 30 MCG/0.3 ML DOSE VACCINE: CPT | Mod: S$GLB,,, | Performed by: FAMILY MEDICINE

## 2021-01-14 PROCEDURE — 91300 COVID-19, MRNA, LNP-S, PF, 30 MCG/0.3 ML DOSE VACCINE: ICD-10-PCS | Mod: S$GLB,,, | Performed by: FAMILY MEDICINE

## 2021-02-04 ENCOUNTER — IMMUNIZATION (OUTPATIENT)
Dept: PRIMARY CARE CLINIC | Facility: CLINIC | Age: 76
End: 2021-02-04
Payer: MEDICARE

## 2021-02-04 DIAGNOSIS — Z23 NEED FOR VACCINATION: Primary | ICD-10-CM

## 2021-02-04 PROCEDURE — 0002A COVID-19, MRNA, LNP-S, PF, 30 MCG/0.3 ML DOSE VACCINE: CPT | Mod: CV19,S$GLB,, | Performed by: FAMILY MEDICINE

## 2021-02-04 PROCEDURE — 91300 COVID-19, MRNA, LNP-S, PF, 30 MCG/0.3 ML DOSE VACCINE: CPT | Mod: S$GLB,,, | Performed by: FAMILY MEDICINE

## 2021-02-04 PROCEDURE — 0002A COVID-19, MRNA, LNP-S, PF, 30 MCG/0.3 ML DOSE VACCINE: ICD-10-PCS | Mod: CV19,S$GLB,, | Performed by: FAMILY MEDICINE

## 2021-02-04 PROCEDURE — 91300 COVID-19, MRNA, LNP-S, PF, 30 MCG/0.3 ML DOSE VACCINE: ICD-10-PCS | Mod: S$GLB,,, | Performed by: FAMILY MEDICINE

## 2021-02-23 ENCOUNTER — PATIENT OUTREACH (OUTPATIENT)
Dept: ADMINISTRATIVE | Facility: HOSPITAL | Age: 76
End: 2021-02-23

## 2021-03-08 ENCOUNTER — PATIENT OUTREACH (OUTPATIENT)
Dept: ADMINISTRATIVE | Facility: OTHER | Age: 76
End: 2021-03-08

## 2021-03-10 ENCOUNTER — OFFICE VISIT (OUTPATIENT)
Dept: DERMATOLOGY | Facility: CLINIC | Age: 76
End: 2021-03-10
Payer: MEDICARE

## 2021-03-10 DIAGNOSIS — D22.9 BENIGN NEVUS: ICD-10-CM

## 2021-03-10 DIAGNOSIS — D23.9 BLUE NEVUS: ICD-10-CM

## 2021-03-10 DIAGNOSIS — L82.1 SEBORRHEIC KERATOSIS: Primary | ICD-10-CM

## 2021-03-10 DIAGNOSIS — L91.8 SKIN TAG: ICD-10-CM

## 2021-03-10 DIAGNOSIS — L82.0 INFLAMED SEBORRHEIC KERATOSIS: ICD-10-CM

## 2021-03-10 PROCEDURE — 17110 PR DESTRUCTION BENIGN LESIONS UP TO 14: ICD-10-PCS | Mod: S$GLB,,, | Performed by: STUDENT IN AN ORGANIZED HEALTH CARE EDUCATION/TRAINING PROGRAM

## 2021-03-10 PROCEDURE — 99213 OFFICE O/P EST LOW 20 MIN: CPT | Mod: 25,S$GLB,, | Performed by: STUDENT IN AN ORGANIZED HEALTH CARE EDUCATION/TRAINING PROGRAM

## 2021-03-10 PROCEDURE — 3288F FALL RISK ASSESSMENT DOCD: CPT | Mod: CPTII,S$GLB,, | Performed by: STUDENT IN AN ORGANIZED HEALTH CARE EDUCATION/TRAINING PROGRAM

## 2021-03-10 PROCEDURE — 1159F MED LIST DOCD IN RCRD: CPT | Mod: S$GLB,,, | Performed by: STUDENT IN AN ORGANIZED HEALTH CARE EDUCATION/TRAINING PROGRAM

## 2021-03-10 PROCEDURE — 99213 PR OFFICE/OUTPT VISIT, EST, LEVL III, 20-29 MIN: ICD-10-PCS | Mod: 25,S$GLB,, | Performed by: STUDENT IN AN ORGANIZED HEALTH CARE EDUCATION/TRAINING PROGRAM

## 2021-03-10 PROCEDURE — 1100F PTFALLS ASSESS-DOCD GE2>/YR: CPT | Mod: CPTII,S$GLB,, | Performed by: STUDENT IN AN ORGANIZED HEALTH CARE EDUCATION/TRAINING PROGRAM

## 2021-03-10 PROCEDURE — 17110 DESTRUCTION B9 LES UP TO 14: CPT | Mod: S$GLB,,, | Performed by: STUDENT IN AN ORGANIZED HEALTH CARE EDUCATION/TRAINING PROGRAM

## 2021-03-10 PROCEDURE — 1159F PR MEDICATION LIST DOCUMENTED IN MEDICAL RECORD: ICD-10-PCS | Mod: S$GLB,,, | Performed by: STUDENT IN AN ORGANIZED HEALTH CARE EDUCATION/TRAINING PROGRAM

## 2021-03-10 PROCEDURE — 99999 PR PBB SHADOW E&M-EST. PATIENT-LVL III: CPT | Mod: PBBFAC,,, | Performed by: STUDENT IN AN ORGANIZED HEALTH CARE EDUCATION/TRAINING PROGRAM

## 2021-03-10 PROCEDURE — 99999 PR PBB SHADOW E&M-EST. PATIENT-LVL III: ICD-10-PCS | Mod: PBBFAC,,, | Performed by: STUDENT IN AN ORGANIZED HEALTH CARE EDUCATION/TRAINING PROGRAM

## 2021-03-10 PROCEDURE — 1100F PR PT FALLS ASSESS DOC 2+ FALLS/FALL W/INJURY/YR: ICD-10-PCS | Mod: CPTII,S$GLB,, | Performed by: STUDENT IN AN ORGANIZED HEALTH CARE EDUCATION/TRAINING PROGRAM

## 2021-03-10 PROCEDURE — 3288F PR FALLS RISK ASSESSMENT DOCUMENTED: ICD-10-PCS | Mod: CPTII,S$GLB,, | Performed by: STUDENT IN AN ORGANIZED HEALTH CARE EDUCATION/TRAINING PROGRAM

## 2021-04-05 ENCOUNTER — PROCEDURE VISIT (OUTPATIENT)
Dept: DERMATOLOGY | Facility: CLINIC | Age: 76
End: 2021-04-05
Payer: MEDICARE

## 2021-04-05 DIAGNOSIS — L82.1 SEBORRHEIC KERATOSES: Primary | ICD-10-CM

## 2021-04-05 PROCEDURE — 99499 UNLISTED E&M SERVICE: CPT | Mod: ,,, | Performed by: STUDENT IN AN ORGANIZED HEALTH CARE EDUCATION/TRAINING PROGRAM

## 2021-04-05 PROCEDURE — 17111 PR DESTRUCTION BENIGN LESIONS 15 OR MORE: ICD-10-PCS | Mod: CSM,S$GLB,, | Performed by: STUDENT IN AN ORGANIZED HEALTH CARE EDUCATION/TRAINING PROGRAM

## 2021-04-05 PROCEDURE — 99499 NO LOS: ICD-10-PCS | Mod: ,,, | Performed by: STUDENT IN AN ORGANIZED HEALTH CARE EDUCATION/TRAINING PROGRAM

## 2021-04-05 PROCEDURE — 17111 DESTRUCTION B9 LESIONS 15/>: CPT | Mod: CSM,S$GLB,, | Performed by: STUDENT IN AN ORGANIZED HEALTH CARE EDUCATION/TRAINING PROGRAM

## 2021-05-10 ENCOUNTER — PATIENT OUTREACH (OUTPATIENT)
Dept: ADMINISTRATIVE | Facility: HOSPITAL | Age: 76
End: 2021-05-10

## 2021-05-11 DIAGNOSIS — I10 ESSENTIAL HYPERTENSION: ICD-10-CM

## 2021-05-14 RX ORDER — AMLODIPINE BESYLATE 10 MG/1
10 TABLET ORAL DAILY
Qty: 90 TABLET | Refills: 1 | Status: SHIPPED | OUTPATIENT
Start: 2021-05-14 | End: 2021-11-08

## 2021-05-23 ENCOUNTER — PATIENT OUTREACH (OUTPATIENT)
Dept: ADMINISTRATIVE | Facility: HOSPITAL | Age: 76
End: 2021-05-23

## 2021-05-24 ENCOUNTER — OFFICE VISIT (OUTPATIENT)
Dept: FAMILY MEDICINE | Facility: CLINIC | Age: 76
End: 2021-05-24
Payer: MEDICARE

## 2021-05-24 VITALS
TEMPERATURE: 98 F | RESPIRATION RATE: 18 BRPM | DIASTOLIC BLOOD PRESSURE: 70 MMHG | WEIGHT: 196 LBS | HEIGHT: 69 IN | SYSTOLIC BLOOD PRESSURE: 134 MMHG | BODY MASS INDEX: 29.03 KG/M2 | HEART RATE: 61 BPM

## 2021-05-24 DIAGNOSIS — N40.0 BENIGN NON-NODULAR PROSTATIC HYPERPLASIA WITHOUT LOWER URINARY TRACT SYMPTOMS: ICD-10-CM

## 2021-05-24 DIAGNOSIS — M79.10 MYALGIA: ICD-10-CM

## 2021-05-24 DIAGNOSIS — Z12.5 PROSTATE CANCER SCREENING: ICD-10-CM

## 2021-05-24 DIAGNOSIS — I25.10 CORONARY ARTERY DISEASE INVOLVING NATIVE CORONARY ARTERY OF NATIVE HEART WITHOUT ANGINA PECTORIS: ICD-10-CM

## 2021-05-24 DIAGNOSIS — T46.6X5A STATIN MYOPATHY: ICD-10-CM

## 2021-05-24 DIAGNOSIS — H93.13 TINNITUS OF BOTH EARS: Primary | ICD-10-CM

## 2021-05-24 DIAGNOSIS — I10 ESSENTIAL HYPERTENSION: ICD-10-CM

## 2021-05-24 DIAGNOSIS — G72.0 STATIN MYOPATHY: ICD-10-CM

## 2021-05-24 DIAGNOSIS — R73.09 ABNORMAL GLUCOSE: ICD-10-CM

## 2021-05-24 PROCEDURE — 99499 UNLISTED E&M SERVICE: CPT | Mod: S$GLB,,, | Performed by: FAMILY MEDICINE

## 2021-05-24 PROCEDURE — 1159F MED LIST DOCD IN RCRD: CPT | Mod: S$GLB,,, | Performed by: FAMILY MEDICINE

## 2021-05-24 PROCEDURE — 99214 OFFICE O/P EST MOD 30 MIN: CPT | Mod: S$GLB,,, | Performed by: FAMILY MEDICINE

## 2021-05-24 PROCEDURE — 1159F PR MEDICATION LIST DOCUMENTED IN MEDICAL RECORD: ICD-10-PCS | Mod: S$GLB,,, | Performed by: FAMILY MEDICINE

## 2021-05-24 PROCEDURE — 3008F PR BODY MASS INDEX (BMI) DOCUMENTED: ICD-10-PCS | Mod: CPTII,S$GLB,, | Performed by: FAMILY MEDICINE

## 2021-05-24 PROCEDURE — 3288F FALL RISK ASSESSMENT DOCD: CPT | Mod: CPTII,S$GLB,, | Performed by: FAMILY MEDICINE

## 2021-05-24 PROCEDURE — 99214 PR OFFICE/OUTPT VISIT, EST, LEVL IV, 30-39 MIN: ICD-10-PCS | Mod: S$GLB,,, | Performed by: FAMILY MEDICINE

## 2021-05-24 PROCEDURE — 99499 RISK ADDL DX/OHS AUDIT: ICD-10-PCS | Mod: S$GLB,,, | Performed by: FAMILY MEDICINE

## 2021-05-24 PROCEDURE — 99999 PR PBB SHADOW E&M-EST. PATIENT-LVL III: CPT | Mod: PBBFAC,,, | Performed by: FAMILY MEDICINE

## 2021-05-24 PROCEDURE — 1126F AMNT PAIN NOTED NONE PRSNT: CPT | Mod: S$GLB,,, | Performed by: FAMILY MEDICINE

## 2021-05-24 PROCEDURE — 3008F BODY MASS INDEX DOCD: CPT | Mod: CPTII,S$GLB,, | Performed by: FAMILY MEDICINE

## 2021-05-24 PROCEDURE — 99999 PR PBB SHADOW E&M-EST. PATIENT-LVL III: ICD-10-PCS | Mod: PBBFAC,,, | Performed by: FAMILY MEDICINE

## 2021-05-24 PROCEDURE — 1126F PR PAIN SEVERITY QUANTIFIED, NO PAIN PRESENT: ICD-10-PCS | Mod: S$GLB,,, | Performed by: FAMILY MEDICINE

## 2021-05-24 PROCEDURE — 1101F PT FALLS ASSESS-DOCD LE1/YR: CPT | Mod: CPTII,S$GLB,, | Performed by: FAMILY MEDICINE

## 2021-05-24 PROCEDURE — 1101F PR PT FALLS ASSESS DOC 0-1 FALLS W/OUT INJ PAST YR: ICD-10-PCS | Mod: CPTII,S$GLB,, | Performed by: FAMILY MEDICINE

## 2021-05-24 PROCEDURE — 3288F PR FALLS RISK ASSESSMENT DOCUMENTED: ICD-10-PCS | Mod: CPTII,S$GLB,, | Performed by: FAMILY MEDICINE

## 2021-05-24 RX ORDER — ALPRAZOLAM 0.5 MG/1
0.5 TABLET ORAL 2 TIMES DAILY PRN
Qty: 30 TABLET | Refills: 5 | Status: SHIPPED | OUTPATIENT
Start: 2021-05-24 | End: 2021-11-24 | Stop reason: SDUPTHER

## 2021-05-24 RX ORDER — FINASTERIDE 5 MG/1
5 TABLET, FILM COATED ORAL DAILY
Qty: 90 TABLET | Refills: 3 | Status: SHIPPED | OUTPATIENT
Start: 2021-05-24 | End: 2022-08-01

## 2021-05-24 RX ORDER — ISOSORBIDE MONONITRATE 30 MG/1
30 TABLET, EXTENDED RELEASE ORAL DAILY
Qty: 30 TABLET | Refills: 11 | Status: SHIPPED | OUTPATIENT
Start: 2021-05-24 | End: 2022-03-20

## 2021-11-03 ENCOUNTER — PES CALL (OUTPATIENT)
Dept: ADMINISTRATIVE | Facility: CLINIC | Age: 76
End: 2021-11-03
Payer: MEDICARE

## 2021-11-07 DIAGNOSIS — I10 ESSENTIAL HYPERTENSION: ICD-10-CM

## 2021-11-08 RX ORDER — AMLODIPINE BESYLATE 10 MG/1
TABLET ORAL
Qty: 90 TABLET | Refills: 1 | Status: SHIPPED | OUTPATIENT
Start: 2021-11-08 | End: 2022-03-20

## 2021-11-17 ENCOUNTER — LAB VISIT (OUTPATIENT)
Dept: LAB | Facility: HOSPITAL | Age: 76
End: 2021-11-17
Attending: FAMILY MEDICINE
Payer: MEDICARE

## 2021-11-17 DIAGNOSIS — R73.09 ABNORMAL GLUCOSE: ICD-10-CM

## 2021-11-17 DIAGNOSIS — Z12.5 PROSTATE CANCER SCREENING: ICD-10-CM

## 2021-11-17 DIAGNOSIS — I10 ESSENTIAL HYPERTENSION: ICD-10-CM

## 2021-11-17 LAB
ALBUMIN SERPL BCP-MCNC: 4.3 G/DL (ref 3.5–5.2)
ALP SERPL-CCNC: 55 U/L (ref 55–135)
ALT SERPL W/O P-5'-P-CCNC: 59 U/L (ref 10–44)
ANION GAP SERPL CALC-SCNC: 9 MMOL/L (ref 8–16)
AST SERPL-CCNC: 58 U/L (ref 10–40)
BILIRUB SERPL-MCNC: 1 MG/DL (ref 0.1–1)
BUN SERPL-MCNC: 22 MG/DL (ref 8–23)
CALCIUM SERPL-MCNC: 9.7 MG/DL (ref 8.7–10.5)
CHLORIDE SERPL-SCNC: 104 MMOL/L (ref 95–110)
CHOLEST SERPL-MCNC: 160 MG/DL (ref 120–199)
CHOLEST/HDLC SERPL: 5.9 {RATIO} (ref 2–5)
CO2 SERPL-SCNC: 27 MMOL/L (ref 23–29)
COMPLEXED PSA SERPL-MCNC: 1.7 NG/ML (ref 0–4)
CREAT SERPL-MCNC: 1.3 MG/DL (ref 0.5–1.4)
EST. GFR  (AFRICAN AMERICAN): >60 ML/MIN/1.73 M^2
EST. GFR  (NON AFRICAN AMERICAN): 53.4 ML/MIN/1.73 M^2
ESTIMATED AVG GLUCOSE: 120 MG/DL (ref 68–131)
GLUCOSE SERPL-MCNC: 136 MG/DL (ref 70–110)
HBA1C MFR BLD: 5.8 % (ref 4–5.6)
HDLC SERPL-MCNC: 27 MG/DL (ref 40–75)
HDLC SERPL: 16.9 % (ref 20–50)
LDLC SERPL CALC-MCNC: 90.2 MG/DL (ref 63–159)
NONHDLC SERPL-MCNC: 133 MG/DL
POTASSIUM SERPL-SCNC: 3.4 MMOL/L (ref 3.5–5.1)
PROT SERPL-MCNC: 7.9 G/DL (ref 6–8.4)
SODIUM SERPL-SCNC: 140 MMOL/L (ref 136–145)
TRIGL SERPL-MCNC: 214 MG/DL (ref 30–150)

## 2021-11-17 PROCEDURE — 84153 ASSAY OF PSA TOTAL: CPT | Performed by: FAMILY MEDICINE

## 2021-11-17 PROCEDURE — 80053 COMPREHEN METABOLIC PANEL: CPT | Performed by: FAMILY MEDICINE

## 2021-11-17 PROCEDURE — 80061 LIPID PANEL: CPT | Performed by: FAMILY MEDICINE

## 2021-11-17 PROCEDURE — 36415 COLL VENOUS BLD VENIPUNCTURE: CPT | Mod: PO | Performed by: FAMILY MEDICINE

## 2021-11-17 PROCEDURE — 83036 HEMOGLOBIN GLYCOSYLATED A1C: CPT | Performed by: FAMILY MEDICINE

## 2021-11-24 ENCOUNTER — OFFICE VISIT (OUTPATIENT)
Dept: FAMILY MEDICINE | Facility: CLINIC | Age: 76
End: 2021-11-24
Payer: MEDICARE

## 2021-11-24 ENCOUNTER — TELEPHONE (OUTPATIENT)
Dept: FAMILY MEDICINE | Facility: CLINIC | Age: 76
End: 2021-11-24

## 2021-11-24 VITALS
SYSTOLIC BLOOD PRESSURE: 138 MMHG | WEIGHT: 190.06 LBS | BODY MASS INDEX: 28.15 KG/M2 | TEMPERATURE: 98 F | HEART RATE: 72 BPM | RESPIRATION RATE: 18 BRPM | OXYGEN SATURATION: 96 % | DIASTOLIC BLOOD PRESSURE: 60 MMHG | HEIGHT: 69 IN

## 2021-11-24 DIAGNOSIS — H93.13 TINNITUS OF BOTH EARS: ICD-10-CM

## 2021-11-24 DIAGNOSIS — N20.0 KIDNEY STONES: ICD-10-CM

## 2021-11-24 DIAGNOSIS — I10 ESSENTIAL HYPERTENSION: Primary | ICD-10-CM

## 2021-11-24 DIAGNOSIS — I25.10 CORONARY ARTERY DISEASE INVOLVING NATIVE CORONARY ARTERY OF NATIVE HEART WITHOUT ANGINA PECTORIS: ICD-10-CM

## 2021-11-24 DIAGNOSIS — K76.9 LIVER DISEASE, UNSPECIFIED: ICD-10-CM

## 2021-11-24 PROCEDURE — 90694 FLU VACCINE - QUADRIVALENT - ADJUVANTED: ICD-10-PCS | Mod: S$GLB,,, | Performed by: FAMILY MEDICINE

## 2021-11-24 PROCEDURE — G0008 FLU VACCINE - QUADRIVALENT - ADJUVANTED: ICD-10-PCS | Mod: S$GLB,,, | Performed by: FAMILY MEDICINE

## 2021-11-24 PROCEDURE — 99999 PR PBB SHADOW E&M-EST. PATIENT-LVL IV: ICD-10-PCS | Mod: PBBFAC,,, | Performed by: FAMILY MEDICINE

## 2021-11-24 PROCEDURE — 99214 OFFICE O/P EST MOD 30 MIN: CPT | Mod: 25,S$GLB,, | Performed by: FAMILY MEDICINE

## 2021-11-24 PROCEDURE — G0008 ADMIN INFLUENZA VIRUS VAC: HCPCS | Mod: S$GLB,,, | Performed by: FAMILY MEDICINE

## 2021-11-24 PROCEDURE — 99214 PR OFFICE/OUTPT VISIT, EST, LEVL IV, 30-39 MIN: ICD-10-PCS | Mod: 25,S$GLB,, | Performed by: FAMILY MEDICINE

## 2021-11-24 PROCEDURE — 99499 UNLISTED E&M SERVICE: CPT | Mod: S$GLB,,, | Performed by: FAMILY MEDICINE

## 2021-11-24 PROCEDURE — 99999 PR PBB SHADOW E&M-EST. PATIENT-LVL IV: CPT | Mod: PBBFAC,,, | Performed by: FAMILY MEDICINE

## 2021-11-24 PROCEDURE — 90694 VACC AIIV4 NO PRSRV 0.5ML IM: CPT | Mod: S$GLB,,, | Performed by: FAMILY MEDICINE

## 2021-11-24 PROCEDURE — 99499 RISK ADDL DX/OHS AUDIT: ICD-10-PCS | Mod: S$GLB,,, | Performed by: FAMILY MEDICINE

## 2021-11-24 RX ORDER — CHLORTHALIDONE 25 MG/1
TABLET ORAL
Qty: 45 TABLET | Refills: 3 | Status: SHIPPED | OUTPATIENT
Start: 2021-11-24 | End: 2022-10-28 | Stop reason: SDUPTHER

## 2021-11-24 RX ORDER — ATENOLOL 50 MG/1
50 TABLET ORAL DAILY
Qty: 90 TABLET | Refills: 3 | Status: SHIPPED | OUTPATIENT
Start: 2021-11-24 | End: 2022-10-28 | Stop reason: SDUPTHER

## 2021-11-24 RX ORDER — CLOPIDOGREL BISULFATE 75 MG/1
75 TABLET ORAL DAILY
Qty: 90 TABLET | Refills: 3 | Status: SHIPPED | OUTPATIENT
Start: 2021-11-24 | End: 2022-10-28 | Stop reason: SDUPTHER

## 2021-11-24 RX ORDER — ALPRAZOLAM 0.5 MG/1
0.5 TABLET ORAL 2 TIMES DAILY PRN
Qty: 30 TABLET | Refills: 5 | Status: SHIPPED | OUTPATIENT
Start: 2021-11-24 | End: 2022-04-28 | Stop reason: SDUPTHER

## 2021-11-26 ENCOUNTER — TELEPHONE (OUTPATIENT)
Dept: UROLOGY | Facility: CLINIC | Age: 76
End: 2021-11-26
Payer: MEDICARE

## 2021-12-08 ENCOUNTER — IMMUNIZATION (OUTPATIENT)
Dept: FAMILY MEDICINE | Facility: CLINIC | Age: 76
End: 2021-12-08
Payer: MEDICARE

## 2021-12-08 DIAGNOSIS — Z23 NEED FOR VACCINATION: Primary | ICD-10-CM

## 2021-12-08 PROCEDURE — 0004A COVID-19, MRNA, LNP-S, PF, 30 MCG/0.3 ML DOSE VACCINE: CPT | Mod: PBBFAC | Performed by: INTERNAL MEDICINE

## 2021-12-15 ENCOUNTER — TELEPHONE (OUTPATIENT)
Dept: FAMILY MEDICINE | Facility: CLINIC | Age: 76
End: 2021-12-15
Payer: MEDICARE

## 2021-12-15 DIAGNOSIS — R16.0 MASS OF MULTIPLE SITES OF LIVER: Primary | ICD-10-CM

## 2021-12-16 ENCOUNTER — TELEPHONE (OUTPATIENT)
Dept: HEMATOLOGY/ONCOLOGY | Facility: CLINIC | Age: 76
End: 2021-12-16
Payer: MEDICARE

## 2021-12-16 ENCOUNTER — TELEPHONE (OUTPATIENT)
Dept: FAMILY MEDICINE | Facility: CLINIC | Age: 76
End: 2021-12-16
Payer: MEDICARE

## 2021-12-22 ENCOUNTER — LAB VISIT (OUTPATIENT)
Dept: LAB | Facility: HOSPITAL | Age: 76
End: 2021-12-22
Attending: INTERNAL MEDICINE
Payer: MEDICARE

## 2021-12-22 ENCOUNTER — OFFICE VISIT (OUTPATIENT)
Dept: HEMATOLOGY/ONCOLOGY | Facility: CLINIC | Age: 76
End: 2021-12-22
Payer: MEDICARE

## 2021-12-22 VITALS
SYSTOLIC BLOOD PRESSURE: 130 MMHG | TEMPERATURE: 98 F | HEART RATE: 57 BPM | HEIGHT: 69 IN | DIASTOLIC BLOOD PRESSURE: 80 MMHG | WEIGHT: 190.94 LBS | BODY MASS INDEX: 28.28 KG/M2 | OXYGEN SATURATION: 97 %

## 2021-12-22 DIAGNOSIS — C78.7 SECONDARY MALIGNANT NEOPLASM OF LIVER AND INTRAHEPATIC BILE DUCT: ICD-10-CM

## 2021-12-22 DIAGNOSIS — K76.0 FATTY LIVER: ICD-10-CM

## 2021-12-22 DIAGNOSIS — N28.89 LEFT RENAL MASS: ICD-10-CM

## 2021-12-22 DIAGNOSIS — I10 HYPERTENSION, UNSPECIFIED TYPE: ICD-10-CM

## 2021-12-22 DIAGNOSIS — R16.0 MASS OF MULTIPLE SITES OF LIVER: Primary | ICD-10-CM

## 2021-12-22 DIAGNOSIS — K86.9 PANCREATIC LESION: ICD-10-CM

## 2021-12-22 DIAGNOSIS — R16.0 MASS OF MULTIPLE SITES OF LIVER: ICD-10-CM

## 2021-12-22 DIAGNOSIS — C22.9 MALIGNANT NEOPLASM OF LIVER, UNSPECIFIED LIVER MALIGNANCY TYPE: ICD-10-CM

## 2021-12-22 DIAGNOSIS — I25.10 CORONARY ARTERY DISEASE, UNSPECIFIED VESSEL OR LESION TYPE, UNSPECIFIED WHETHER ANGINA PRESENT, UNSPECIFIED WHETHER NATIVE OR TRANSPLANTED HEART: ICD-10-CM

## 2021-12-22 DIAGNOSIS — E78.5 HYPERLIPIDEMIA, UNSPECIFIED HYPERLIPIDEMIA TYPE: ICD-10-CM

## 2021-12-22 LAB
AFP SERPL-MCNC: 6 NG/ML (ref 0–8.4)
ALBUMIN SERPL BCP-MCNC: 4.2 G/DL (ref 3.5–5.2)
ALP SERPL-CCNC: 53 U/L (ref 55–135)
ALT SERPL W/O P-5'-P-CCNC: 53 U/L (ref 10–44)
ANION GAP SERPL CALC-SCNC: 10 MMOL/L (ref 8–16)
AST SERPL-CCNC: 39 U/L (ref 10–40)
BASOPHILS # BLD AUTO: 0.07 K/UL (ref 0–0.2)
BASOPHILS NFR BLD: 0.8 % (ref 0–1.9)
BILIRUB SERPL-MCNC: 0.7 MG/DL (ref 0.1–1)
BUN SERPL-MCNC: 21 MG/DL (ref 8–23)
CALCIUM SERPL-MCNC: 9.1 MG/DL (ref 8.7–10.5)
CHLORIDE SERPL-SCNC: 105 MMOL/L (ref 95–110)
CO2 SERPL-SCNC: 28 MMOL/L (ref 23–29)
CREAT SERPL-MCNC: 1.3 MG/DL (ref 0.5–1.4)
DIFFERENTIAL METHOD: NORMAL
EOSINOPHIL # BLD AUTO: 0.2 K/UL (ref 0–0.5)
EOSINOPHIL NFR BLD: 1.7 % (ref 0–8)
ERYTHROCYTE [DISTWIDTH] IN BLOOD BY AUTOMATED COUNT: 12.5 % (ref 11.5–14.5)
EST. GFR  (AFRICAN AMERICAN): >60 ML/MIN/1.73 M^2
EST. GFR  (NON AFRICAN AMERICAN): 53 ML/MIN/1.73 M^2
GLUCOSE SERPL-MCNC: 115 MG/DL (ref 70–110)
HCT VFR BLD AUTO: 48.4 % (ref 40–54)
HGB BLD-MCNC: 16.3 G/DL (ref 14–18)
IMM GRANULOCYTES # BLD AUTO: 0.04 K/UL (ref 0–0.04)
IMM GRANULOCYTES NFR BLD AUTO: 0.4 % (ref 0–0.5)
LYMPHOCYTES # BLD AUTO: 2.3 K/UL (ref 1–4.8)
LYMPHOCYTES NFR BLD: 25.3 % (ref 18–48)
MCH RBC QN AUTO: 29.2 PG (ref 27–31)
MCHC RBC AUTO-ENTMCNC: 33.7 G/DL (ref 32–36)
MCV RBC AUTO: 87 FL (ref 82–98)
MONOCYTES # BLD AUTO: 0.8 K/UL (ref 0.3–1)
MONOCYTES NFR BLD: 9.1 % (ref 4–15)
NEUTROPHILS # BLD AUTO: 5.8 K/UL (ref 1.8–7.7)
NEUTROPHILS NFR BLD: 62.7 % (ref 38–73)
NRBC BLD-RTO: 0 /100 WBC
PLATELET # BLD AUTO: 249 K/UL (ref 150–450)
PMV BLD AUTO: 10.5 FL (ref 9.2–12.9)
POTASSIUM SERPL-SCNC: 3.8 MMOL/L (ref 3.5–5.1)
PROT SERPL-MCNC: 7.9 G/DL (ref 6–8.4)
RBC # BLD AUTO: 5.58 M/UL (ref 4.6–6.2)
SODIUM SERPL-SCNC: 143 MMOL/L (ref 136–145)
WBC # BLD AUTO: 9.22 K/UL (ref 3.9–12.7)

## 2021-12-22 PROCEDURE — 1159F MED LIST DOCD IN RCRD: CPT | Mod: CPTII,S$GLB,, | Performed by: INTERNAL MEDICINE

## 2021-12-22 PROCEDURE — 3044F HG A1C LEVEL LT 7.0%: CPT | Mod: CPTII,S$GLB,, | Performed by: INTERNAL MEDICINE

## 2021-12-22 PROCEDURE — 99204 OFFICE O/P NEW MOD 45 MIN: CPT | Mod: S$GLB,,, | Performed by: INTERNAL MEDICINE

## 2021-12-22 PROCEDURE — 86301 IMMUNOASSAY TUMOR CA 19-9: CPT | Performed by: INTERNAL MEDICINE

## 2021-12-22 PROCEDURE — 99499 UNLISTED E&M SERVICE: CPT | Mod: S$GLB,,, | Performed by: INTERNAL MEDICINE

## 2021-12-22 PROCEDURE — 1101F PR PT FALLS ASSESS DOC 0-1 FALLS W/OUT INJ PAST YR: ICD-10-PCS | Mod: CPTII,S$GLB,, | Performed by: INTERNAL MEDICINE

## 2021-12-22 PROCEDURE — 3079F DIAST BP 80-89 MM HG: CPT | Mod: CPTII,S$GLB,, | Performed by: INTERNAL MEDICINE

## 2021-12-22 PROCEDURE — 3044F PR MOST RECENT HEMOGLOBIN A1C LEVEL <7.0%: ICD-10-PCS | Mod: CPTII,S$GLB,, | Performed by: INTERNAL MEDICINE

## 2021-12-22 PROCEDURE — 3075F PR MOST RECENT SYSTOLIC BLOOD PRESS GE 130-139MM HG: ICD-10-PCS | Mod: CPTII,S$GLB,, | Performed by: INTERNAL MEDICINE

## 2021-12-22 PROCEDURE — 36415 COLL VENOUS BLD VENIPUNCTURE: CPT | Mod: PN | Performed by: INTERNAL MEDICINE

## 2021-12-22 PROCEDURE — 85025 COMPLETE CBC W/AUTO DIFF WBC: CPT | Mod: PO | Performed by: INTERNAL MEDICINE

## 2021-12-22 PROCEDURE — 3075F SYST BP GE 130 - 139MM HG: CPT | Mod: CPTII,S$GLB,, | Performed by: INTERNAL MEDICINE

## 2021-12-22 PROCEDURE — 1159F PR MEDICATION LIST DOCUMENTED IN MEDICAL RECORD: ICD-10-PCS | Mod: CPTII,S$GLB,, | Performed by: INTERNAL MEDICINE

## 2021-12-22 PROCEDURE — 1126F PR PAIN SEVERITY QUANTIFIED, NO PAIN PRESENT: ICD-10-PCS | Mod: CPTII,S$GLB,, | Performed by: INTERNAL MEDICINE

## 2021-12-22 PROCEDURE — 99204 PR OFFICE/OUTPT VISIT, NEW, LEVL IV, 45-59 MIN: ICD-10-PCS | Mod: S$GLB,,, | Performed by: INTERNAL MEDICINE

## 2021-12-22 PROCEDURE — 82105 ALPHA-FETOPROTEIN SERUM: CPT | Performed by: INTERNAL MEDICINE

## 2021-12-22 PROCEDURE — 99999 PR PBB SHADOW E&M-EST. PATIENT-LVL IV: CPT | Mod: PBBFAC,,, | Performed by: INTERNAL MEDICINE

## 2021-12-22 PROCEDURE — 3288F PR FALLS RISK ASSESSMENT DOCUMENTED: ICD-10-PCS | Mod: CPTII,S$GLB,, | Performed by: INTERNAL MEDICINE

## 2021-12-22 PROCEDURE — 99499 RISK ADDL DX/OHS AUDIT: ICD-10-PCS | Mod: S$GLB,,, | Performed by: INTERNAL MEDICINE

## 2021-12-22 PROCEDURE — 99999 PR PBB SHADOW E&M-EST. PATIENT-LVL IV: ICD-10-PCS | Mod: PBBFAC,,, | Performed by: INTERNAL MEDICINE

## 2021-12-22 PROCEDURE — 1126F AMNT PAIN NOTED NONE PRSNT: CPT | Mod: CPTII,S$GLB,, | Performed by: INTERNAL MEDICINE

## 2021-12-22 PROCEDURE — 1101F PT FALLS ASSESS-DOCD LE1/YR: CPT | Mod: CPTII,S$GLB,, | Performed by: INTERNAL MEDICINE

## 2021-12-22 PROCEDURE — 3288F FALL RISK ASSESSMENT DOCD: CPT | Mod: CPTII,S$GLB,, | Performed by: INTERNAL MEDICINE

## 2021-12-22 PROCEDURE — 3079F PR MOST RECENT DIASTOLIC BLOOD PRESSURE 80-89 MM HG: ICD-10-PCS | Mod: CPTII,S$GLB,, | Performed by: INTERNAL MEDICINE

## 2021-12-22 PROCEDURE — 80053 COMPREHEN METABOLIC PANEL: CPT | Mod: PO | Performed by: INTERNAL MEDICINE

## 2021-12-23 LAB — CANCER AG19-9 SERPL-ACNC: 277.4 U/ML (ref 0–40)

## 2022-01-03 ENCOUNTER — TELEPHONE (OUTPATIENT)
Dept: HEMATOLOGY/ONCOLOGY | Facility: CLINIC | Age: 77
End: 2022-01-03
Payer: MEDICARE

## 2022-01-04 ENCOUNTER — HOSPITAL ENCOUNTER (OUTPATIENT)
Dept: RADIOLOGY | Facility: HOSPITAL | Age: 77
Discharge: HOME | End: 2022-01-04
Attending: INTERNAL MEDICINE
Payer: MEDICARE

## 2022-01-04 ENCOUNTER — TELEPHONE (OUTPATIENT)
Dept: HEMATOLOGY/ONCOLOGY | Facility: CLINIC | Age: 77
End: 2022-01-04
Payer: MEDICARE

## 2022-01-04 DIAGNOSIS — C78.7 SECONDARY MALIGNANT NEOPLASM OF LIVER AND INTRAHEPATIC BILE DUCT: ICD-10-CM

## 2022-01-04 DIAGNOSIS — R16.0 MASS OF MULTIPLE SITES OF LIVER: ICD-10-CM

## 2022-01-04 LAB — GLUCOSE SERPL-MCNC: 134 MG/DL (ref 70–110)

## 2022-01-04 PROCEDURE — 78815 PET IMAGE W/CT SKULL-THIGH: CPT | Mod: 26,PI,, | Performed by: RADIOLOGY

## 2022-01-04 PROCEDURE — 78815 NM PET CT ROUTINE: ICD-10-PCS | Mod: 26,PI,, | Performed by: RADIOLOGY

## 2022-01-04 PROCEDURE — A9552 F18 FDG: HCPCS | Mod: PN

## 2022-01-04 NOTE — TELEPHONE ENCOUNTER
Assisted in scheduling on different day due to conflict----- Message from Anali Gamez sent at 1/4/2022 12:28 PM CST -----  Regarding: re  Type: Needs Medical Advice  Who Called:  patient   Best Call Back Number: [phone]  Additional Information: patient request call back regarding rescheduling appt., on 1/13/22, please advise.

## 2022-01-04 NOTE — TELEPHONE ENCOUNTER
See previous note----- Message from Amrita Saeed, Patient Care Assistant sent at 1/4/2022  3:30 PM CST -----  Regarding: r/s  Contact: mejia  Type: Needs Medical Advice  Who Called:  mejia  Symptoms (please be specific):  na  How long has patient had these symptoms:  dillan  Pharmacy name and phone #:  na  Best Call Back Number: [phone]    Additional Information: mejia needs to reschedule  the 1/13 appointment , patient would like a  sooner appointments , please call to set up thanks

## 2022-01-04 NOTE — PROGRESS NOTES
PET Imaging Questionnaire    1. Are you a Diabetic? Recent Blood Sugar level? No    2. Are you anemic? Bone Marrow Stimulation Meds? No    3. Have you had a CT Scan, if so when & where was your last one? Yes - 11./3/21    4. Have you had a PET Scan, if so when & where was your last one? No    5. Chemotherapy or currently on Chemotherapy? No    6. Radiation therapy? No    Surgical History:   Past Surgical History:   Procedure Laterality Date    CORONARY ANGIOGRAPHY  1/6/2020    Procedure: ANGIOGRAM, CORONARY ARTERY;  Surgeon: Uday Tripp MD;  Location: [facility] CATH;  Service: Cardiology;;    HERNIA REPAIR      KNEE SURGERY      right; 3 separate surgeries to repair patellar fracture    stapendectomy Bilateral 1985    bilateraly    WISDOM TOOTH EXTRACTION     7.      8. Have you been fasting for at least 6 hours? Yes    9. Is there any chance you may be pregnant or breastfeeding? No    Assay: 10.67 MCi@:10.01   Injection Site:LT AC    Residual: .432 mCi@: 10:04   Technologist: Ravinder Rincon Injected:10.24mCi

## 2022-01-11 NOTE — PROGRESS NOTES
PATIENT: Evan Garcia  MRN: [id number]  DATE: 1/12/2022      Diagnosis:   1. Adenocarcinoma of liver    2. Pancreatic lesion    3. Left renal mass    4. Hypertension, unspecified type    5. Fatty liver    6. Hyperlipidemia, unspecified hyperlipidemia type    7. Coronary artery disease, unspecified vessel or lesion type, unspecified whether angina present, unspecified whether native or transplanted heart        Chief Complaint: Follow-up (Adenocarcinoma)    Subjective:    Initial History: Mr. Garcia is a 76 y.o. male with HLD, HTN, fatty liver, DDD, BPH who presents for work up of liver masses.  Since the last clinic visit the patient underwent PET-CT on 01/04/2022 showing a poorly defined somewhat rounded subcapsular mass in the anterior segment of the right hepatic lobe measuring 4 x 3.2 cm, absence of the 1.7 cm lesion previously measured on MRI, exophytic 2.3 cm nodule arising from the left kidney, and no visualization of the small cystic nodule in the body of the pancreas seen on prior MRI.  The patient underwent biopsy of the liver on 01/05/2022 showing adenocarcinoma with molecular features concerning for primary cholangiocarcinoma versus pancreatic origin.    Currently the patient states he feels well.  The patient denies CP, cough, SOB, abdominal pain, N/V, constipation, diarrhea.  The patient denies fever, chills, night sweats, weight loss, new lumps or bumps, easy bruising or bleeding.    Prior History:  The patient states he be can have having lower left pelvic pain and went to see his urologist for concern of kidney stones.  The patient underwent a CT of the abdomen on 11/03/2021 showing a lesion in the right lobe of the liver measuring 6 cm, diffuse hepatic steatosis, a 1.7 x 1.2 lesion in the right lobe of the liver, bilateral nonobstructing renal calculi, and well defined low-density nodules in the left adrenal gland measuring 1.1 cm and 0.9 cm.  The patient then underwent an MRI of the abdomen on  12/14/2021 showing retraction of the hepatic capsule, diffuse hepatic steatosis, 1.1 cm lesion in the left kidney concerning for RCC, and a 1.4 cm abnormality in the pancreatic neck concerning for IPMN.    Past Medical History:   Past Medical History:   Diagnosis Date    Ankylosing spondylitis     thoracic;     BPH (benign prostatic hypertrophy)     CAD (coronary artery disease)     Cholelithiases     DDD (degenerative disc disease), lumbar     treated with FILOMENA with success    Fatty liver     History of colonic polyps     HTN (hypertension)     Hypertriglyceridemia     Left kidney mass     followed by urology, Dr. De Guzman    Tinnitus of both ears     he consulted with ENT; He uses Xanax PRn    Wears glasses        Past Surgical HIstory:   Past Surgical History:   Procedure Laterality Date    CORONARY ANGIOGRAPHY  1/6/2020    Procedure: ANGIOGRAM, CORONARY ARTERY;  Surgeon: Uday Tripp MD;  Location: [facility];  Service: Cardiology;;    HERNIA REPAIR      KNEE SURGERY      right; 3 separate surgeries to repair patellar fracture    stapendectomy Bilateral 1985    bilateraly    WISDOM TOOTH EXTRACTION         Family History:   Family History   Problem Relation Age of Onset    Aneurysm Father     Cancer Mother         Lymphoma    Diabetes Mother     Melanoma Mother     Cancer Brother         salivary    Cancer Brother         Lymphoma       Social History:  reports that he has quit smoking. His smoking use included cigars and cigarettes. He started smoking about 7 years ago. He has a 10.00 pack-year smoking history. He has never used smokeless tobacco. He reports current alcohol use. He reports that he does not use drugs.    Allergies:  Review of patient's allergies indicates:   Allergen Reactions    Sulfa (sulfonamide antibiotics) Hives and Other (See Comments)     Decreased white blood count per pt       Medications:  Current Outpatient Medications   Medication Sig Dispense Refill     "acetaminophen (TYLENOL) 325 MG tablet Take 1 tablet (325 mg total) by mouth every 6 (six) hours as needed for Pain.      ALPRAZolam (XANAX) 0.5 MG tablet Take 1 tablet (0.5 mg total) by mouth 2 (two) times daily as needed (tinitus). 30 tablet 5    amLODIPine (NORVASC) 10 MG tablet TAKE 1 TABLET BY MOUTH EVERY DAY 90 tablet 1    aspirin (ECOTRIN) 81 MG EC tablet Take 81 mg by mouth once daily.      atenoloL (TENORMIN) 50 MG tablet Take 1 tablet (50 mg total) by mouth once daily. 90 tablet 3    chlorthalidone (HYGROTEN) 25 MG Tab One-half tablet po daily 45 tablet 3    clopidogreL (PLAVIX) 75 mg tablet Take 1 tablet (75 mg total) by mouth once daily. 90 tablet 3    finasteride (PROSCAR) 5 mg tablet Take 1 tablet (5 mg total) by mouth once daily. 90 tablet 3    isosorbide mononitrate (IMDUR) 30 MG 24 hr tablet Take 1 tablet (30 mg total) by mouth once daily. 30 tablet 11     No current facility-administered medications for this visit.       Review of Systems   Constitutional: Negative for chills, diaphoresis, fatigue, fever and unexpected weight change.   Respiratory: Negative for cough and shortness of breath.    Cardiovascular: Negative for chest pain and palpitations.   Gastrointestinal: Negative for abdominal pain, constipation, diarrhea, nausea and vomiting.   Skin: Negative for color change and rash.   Neurological: Negative for headaches.   Hematological: Negative for adenopathy. Does not bruise/bleed easily.       ECOG Performance Status: 0   Objective:      Vitals:   Vitals:    01/12/22 1429   BP: 134/70   BP Location: Right arm   Patient Position: Sitting   BP Method: Medium (Manual)   Pulse: 75   Temp: 97.5 °F (36.4 °C)   TempSrc: Temporal   SpO2: 98%   Weight: 86.7 kg (191 lb 2.2 oz)   Height: 5' 9" (1.753 m)       Physical Exam  Constitutional:       General: He is not in acute distress.     Appearance: He is well-developed. He is not diaphoretic.   HENT:      Head: Normocephalic and atraumatic. "   Cardiovascular:      Rate and Rhythm: Normal rate and regular rhythm.      Heart sounds: Normal heart sounds. No murmur heard.  No friction rub. No gallop.    Pulmonary:      Effort: Pulmonary effort is normal. No respiratory distress.      Breath sounds: Normal breath sounds. No wheezing or rales.   Chest:      Chest wall: No tenderness.   Breasts:      Right: No axillary adenopathy or supraclavicular adenopathy.      Left: No axillary adenopathy or supraclavicular adenopathy.       Abdominal:      General: Bowel sounds are normal. There is no distension.      Palpations: Abdomen is soft. There is no mass.      Tenderness: There is no abdominal tenderness. There is no rebound.   Musculoskeletal:      Cervical back: Normal range of motion.   Lymphadenopathy:      Cervical: No cervical adenopathy.      Upper Body:      Right upper body: No supraclavicular or axillary adenopathy.      Left upper body: No supraclavicular or axillary adenopathy.   Skin:     General: Skin is warm and dry.      Findings: No erythema or rash.   Neurological:      Mental Status: He is alert and oriented to person, place, and time.   Psychiatric:         Behavior: Behavior normal.         Laboratory Data:  No visits with results within 1 Week(s) from this visit.   Latest known visit with results is:   Hospital Outpatient Visit on 01/05/2022   Component Date Value Ref Range Status    WBC 01/05/2022 7.91  3.90 - 12.70 K/uL Final    RBC 01/05/2022 5.53  4.60 - 6.20 M/uL Final    Hemoglobin 01/05/2022 15.9  14.0 - 18.0 g/dL Final    Hematocrit 01/05/2022 47.7  40.0 - 54.0 % Final    MCV 01/05/2022 86  82 - 98 fL Final    MCH 01/05/2022 28.8  27.0 - 31.0 pg Final    MCHC 01/05/2022 33.3  32.0 - 36.0 g/dL Final    RDW 01/05/2022 12.6  11.5 - 14.5 % Final    Platelets 01/05/2022 229  150 - 450 K/uL Final    MPV 01/05/2022 10.7  9.2 - 12.9 fL Final    PT 01/05/2022 13.1  11.8 - 14.7 sec Final    PT normal range is not established for  pediatrics.    INR 01/05/2022 1.0   Final    aPTT 01/05/2022 32.2  24.6 - 36.7 sec Final    PTT normal range is not established for pediatrics.    BUN 01/05/2022 20  9 - 21 mg/dL Final    Creatinine 01/05/2022 1.00  0.50 - 1.40 mg/dL Final    eGFR if African American 01/05/2022 >60  >60 mL/min/1.73 m^2 Final    eGFR if non African American 01/05/2022 >60  >60 mL/min/1.73 m^2 Final    Comment: Calculation used to obtain the estimated glomerular filtration  rate (eGFR) is the CKD-EPI equation.            Imaging:     PET/CT 1/04/22    Mediastinal blood pool max SUV: 3.2     Normal background liver max SUV: 3.4     Head/neck:     No significant abnormal hypermetabolic foci are identified within the head and neck region.  No lymphadenopathy is present.     Chest:     No significant abnormal hypermetabolic foci are identified within the chest.  No hypermetabolic pulmonary nodules, lymphadenopathy, pleural effusion, or pericardial effusion are present.     Abdomen/pelvis:     There is a poorly defined somewhat rounded subcapsular mass in the anterior segment of the right hepatic lobe corresponding to an enhancing mass on the prior MRI study.  The mass causes capsular retraction and a portion of the subcapsular aspect of the mass exhibits hypermetabolism.  The mass is poorly defined on this modality, but on image 136 measures approximately 4.0 x 3.2 cm and is hypermetabolic with a max SUV of 5.5.  An enhancing rounded 1.7 cm nodule in the posterior segment of the right hepatic lobe visible on the prior MRI study is not visible on this examination and no hypermetabolic focus is present in this location.  There is an exophytic 2.3 cm nodule arising from the mid left kidney laterally which exhibits no significant hypermetabolism and has attenuation values in the range of fat.  This nodule exhibits no significant enhancement on the prior MRI study and may represent an atypical angiomyolipoma.  A small cystic nodule in  the body of the pancreas visible on the prior MRI study is not visible on this examination probably due to the differences in modality.  No hypermetabolic foci are identified within the pancreas on this examination.  Based on the MRI peer it is, however, the nodule could represent a side branch IPMN.  No hypermetabolic lymphadenopathy or ascites are present.  The adrenal glands appear normal.  Gallstones and nonobstructing bilateral renal calculi are again noted.     Skeleton:     No significant abnormal hypermetabolic foci are identified within the skeleton.  There are no findings to suggest osseous neoplastic disease.     Assessment:       1. Adenocarcinoma of liver    2. Pancreatic lesion    3. Left renal mass    4. Hypertension, unspecified type    5. Fatty liver    6. Hyperlipidemia, unspecified hyperlipidemia type    7. Coronary artery disease, unspecified vessel or lesion type, unspecified whether angina present, unspecified whether native or transplanted heart           Plan:     Adenocarcinoma- the patient was found to have a 6 cm hepatic lesion on CT abdomen 11/03/2021 confirmed on MRI 12/14/2020  -PET/CT on 1/04/22 showed an avid mass in the liver but no pancreatic lesion  -Biopsy of the lesion on 1/05/22 showed adenocarcinoma of pancreatic or biliary origin  -Will have the patient see Dr Bean with Gastroenterology for possible EUS and biopsy of the pancreas to rule out pancreatic origin  -Will have the patient see Dr Wright to determine resectability if no pancreatic lesion appreciated on EUS  -Ca19-9 on 12/22/21 elevated at 277.4 makign p[ancreatic origin more likely    Pancreatic Lesion - pt with 1.4cm lesion concerning for IPMN on MRI 12/14/21  -This was not appreciated on PET/CT  -Pt to undergo possible EUS with Dr Bean    Left Renal Lesion - PT with an exophytic 2.3cm lesion in the left kidney seen on PET/CT  -Concern is for a second primary renal cancer   -Will work up further pending EUS  results.    HTN - pt on amlodipine, atenolol, imdur  -Management per cardiology    Fatty Liver - seen on CT and MRI  -Puts pt at risk for HCC    HLD - Pt no longer on meds  -Management per cardiology    CAD - pt with ENRIQUE placed on 1st Obtuse marginal on 1/06/20  -PT follows with Dr Chandra in cardiology  -Pt on ASA and plavix    Follow Up - Pt to see Dr Wright; appt with Dr Bean for EUS; return appt with me once he has seen both providers    Victor M Littlejohn MD  [facility]  Hematology and Oncology  [facility]   [address] [place] [address]   O: [phone]  F: [phone]

## 2022-01-12 ENCOUNTER — OFFICE VISIT (OUTPATIENT)
Dept: HEMATOLOGY/ONCOLOGY | Facility: CLINIC | Age: 77
End: 2022-01-12
Payer: MEDICARE

## 2022-01-12 VITALS
DIASTOLIC BLOOD PRESSURE: 70 MMHG | WEIGHT: 191.13 LBS | HEIGHT: 69 IN | HEART RATE: 75 BPM | BODY MASS INDEX: 28.31 KG/M2 | SYSTOLIC BLOOD PRESSURE: 134 MMHG | TEMPERATURE: 98 F | OXYGEN SATURATION: 98 %

## 2022-01-12 DIAGNOSIS — E78.5 HYPERLIPIDEMIA, UNSPECIFIED HYPERLIPIDEMIA TYPE: ICD-10-CM

## 2022-01-12 DIAGNOSIS — I10 HYPERTENSION, UNSPECIFIED TYPE: ICD-10-CM

## 2022-01-12 DIAGNOSIS — K86.9 PANCREATIC LESION: ICD-10-CM

## 2022-01-12 DIAGNOSIS — C22.9 ADENOCARCINOMA OF LIVER: Primary | ICD-10-CM

## 2022-01-12 DIAGNOSIS — N28.89 LEFT RENAL MASS: ICD-10-CM

## 2022-01-12 DIAGNOSIS — K76.0 FATTY LIVER: ICD-10-CM

## 2022-01-12 DIAGNOSIS — I25.10 CORONARY ARTERY DISEASE, UNSPECIFIED VESSEL OR LESION TYPE, UNSPECIFIED WHETHER ANGINA PRESENT, UNSPECIFIED WHETHER NATIVE OR TRANSPLANTED HEART: ICD-10-CM

## 2022-01-12 PROCEDURE — 1126F AMNT PAIN NOTED NONE PRSNT: CPT | Mod: CPTII,S$GLB,, | Performed by: INTERNAL MEDICINE

## 2022-01-12 PROCEDURE — 99999 PR PBB SHADOW E&M-EST. PATIENT-LVL IV: ICD-10-PCS | Mod: PBBFAC,,, | Performed by: INTERNAL MEDICINE

## 2022-01-12 PROCEDURE — 1159F MED LIST DOCD IN RCRD: CPT | Mod: CPTII,S$GLB,, | Performed by: INTERNAL MEDICINE

## 2022-01-12 PROCEDURE — 3288F FALL RISK ASSESSMENT DOCD: CPT | Mod: CPTII,S$GLB,, | Performed by: INTERNAL MEDICINE

## 2022-01-12 PROCEDURE — 1159F PR MEDICATION LIST DOCUMENTED IN MEDICAL RECORD: ICD-10-PCS | Mod: CPTII,S$GLB,, | Performed by: INTERNAL MEDICINE

## 2022-01-12 PROCEDURE — 1101F PT FALLS ASSESS-DOCD LE1/YR: CPT | Mod: CPTII,S$GLB,, | Performed by: INTERNAL MEDICINE

## 2022-01-12 PROCEDURE — 3078F PR MOST RECENT DIASTOLIC BLOOD PRESSURE < 80 MM HG: ICD-10-PCS | Mod: CPTII,S$GLB,, | Performed by: INTERNAL MEDICINE

## 2022-01-12 PROCEDURE — 3288F PR FALLS RISK ASSESSMENT DOCUMENTED: ICD-10-PCS | Mod: CPTII,S$GLB,, | Performed by: INTERNAL MEDICINE

## 2022-01-12 PROCEDURE — 99214 PR OFFICE/OUTPT VISIT, EST, LEVL IV, 30-39 MIN: ICD-10-PCS | Mod: S$GLB,,, | Performed by: INTERNAL MEDICINE

## 2022-01-12 PROCEDURE — 3075F SYST BP GE 130 - 139MM HG: CPT | Mod: CPTII,S$GLB,, | Performed by: INTERNAL MEDICINE

## 2022-01-12 PROCEDURE — 3078F DIAST BP <80 MM HG: CPT | Mod: CPTII,S$GLB,, | Performed by: INTERNAL MEDICINE

## 2022-01-12 PROCEDURE — 3075F PR MOST RECENT SYSTOLIC BLOOD PRESS GE 130-139MM HG: ICD-10-PCS | Mod: CPTII,S$GLB,, | Performed by: INTERNAL MEDICINE

## 2022-01-12 PROCEDURE — 99214 OFFICE O/P EST MOD 30 MIN: CPT | Mod: S$GLB,,, | Performed by: INTERNAL MEDICINE

## 2022-01-12 PROCEDURE — 1126F PR PAIN SEVERITY QUANTIFIED, NO PAIN PRESENT: ICD-10-PCS | Mod: CPTII,S$GLB,, | Performed by: INTERNAL MEDICINE

## 2022-01-12 PROCEDURE — 1101F PR PT FALLS ASSESS DOC 0-1 FALLS W/OUT INJ PAST YR: ICD-10-PCS | Mod: CPTII,S$GLB,, | Performed by: INTERNAL MEDICINE

## 2022-01-12 PROCEDURE — 99999 PR PBB SHADOW E&M-EST. PATIENT-LVL IV: CPT | Mod: PBBFAC,,, | Performed by: INTERNAL MEDICINE

## 2022-01-12 NOTE — Clinical Note
The patient needs to see Dr Kyle SO.  He is willing to go to [facility] if that is the earliest appt.  The patient also needs an urgent appt with Dr Bean for EUS.  The patient will need a return appt with me once he has seen both providers.  He can be double booked to see me if needed.

## 2022-01-13 ENCOUNTER — TELEPHONE (OUTPATIENT)
Dept: HEMATOLOGY/ONCOLOGY | Facility: CLINIC | Age: 77
End: 2022-01-13
Payer: MEDICARE

## 2022-01-13 NOTE — TELEPHONE ENCOUNTER
Rec'd message from Dr. Littlejohn's nurse that the patient needs to see Dr Kyle SO. He is willing to go to [facility] if that is the earliest appt. The patient also needs an urgent appt with Dr Bean for EUS.    Sent message to Katrina in Dr. Bean's office with referral attached for pt to get scheduled for EUS. She has given pt's records to Dr. Bean for review.     Spoke with Dr. Wright's nurse who was able to schedule pt for 1/19 at 9:30 at [facility]. LVM for pt to provide and confirm appt info. Provided call back number for pt to confirm.

## 2022-01-13 NOTE — TELEPHONE ENCOUNTER
Pt called back to inform he has to cancel the appt with Dr. Wright on 1/19 because he has another appt that day. Offered to help pt get rescheduled with Dr. Wright, but pt states he doesn't know right now, he has too many appts. States he will call back to reschedule. Pt states he has another appt on 1/19 that he will also have to reschedule. Asked pt if that is his appt with Dr. Bean. Pt states he wasn't sure. Encouraged pt to keep his appt with Dr. Bean, as Dr. Littlejohn wanted him to be seen asap. Pt stated he will have to call back when he is ready to reschedule the appts.

## 2022-01-21 ENCOUNTER — TELEPHONE (OUTPATIENT)
Dept: HEMATOLOGY/ONCOLOGY | Facility: CLINIC | Age: 77
End: 2022-01-21
Payer: MEDICARE

## 2022-01-21 ENCOUNTER — TELEPHONE (OUTPATIENT)
Dept: CARDIOLOGY | Facility: CLINIC | Age: 77
End: 2022-01-21
Payer: MEDICARE

## 2022-01-21 NOTE — TELEPHONE ENCOUNTER
Dr. Littlejohn informed patient cancelled his appts with Dr. Bean and Dr. Wright and he has no further follow ups with us at this time. Per Dr. Littlejohn patient may be seeking second opionon

## 2022-01-21 NOTE — TELEPHONE ENCOUNTER
Attempted to return call to Shubham to notify patient hasn't been seen in 1 yr. Needs appt prior to clearance. Left voicemail

## 2022-01-21 NOTE — TELEPHONE ENCOUNTER
----- Message from Wendy Sellers LPN sent at 1/20/2022  5:47 PM CST -----    ----- Message -----  From: Wendy Sellers LPN  Sent: 1/20/2022   4:56 PM CST  To: Wendy Sellers LPN      ----- Message -----  From: Johanne Fajardo  Sent: 1/20/2022   9:18 AM CST  To: [facility]el HENDRICK[facility] Staff    Type: Needs Medical Advice  Who Called:  Katrina from Dr. Jemal Bean  Symptoms (please be specific):  On jan 18, faxed request for holding plavix 5 days prior to EUS    Best Call Back Number: [phone] ext [phone]  Additional Information: Please fax back [phone]

## 2022-02-09 ENCOUNTER — PATIENT OUTREACH (OUTPATIENT)
Dept: ADMINISTRATIVE | Facility: OTHER | Age: 77
End: 2022-02-09
Payer: MEDICARE

## 2022-02-09 NOTE — PROGRESS NOTES
LINKS immunization registry updated  Care Everywhere updated  Health Maintenance updated  Chart reviewed for overdue Proactive [facility] (CAR[facility]) health maintenance testing (CRS, Breast Ca, Diabetic Eye Exam)   Orders entered:N/A

## 2022-02-10 ENCOUNTER — TELEPHONE (OUTPATIENT)
Dept: HEMATOLOGY/ONCOLOGY | Facility: CLINIC | Age: 77
End: 2022-02-10
Payer: MEDICARE

## 2022-02-10 NOTE — TELEPHONE ENCOUNTER
Pt has cancel both appts for Dr. Bean and Dr. Wright he said he will seek a second opinion elsewhere.

## 2022-02-11 ENCOUNTER — OFFICE VISIT (OUTPATIENT)
Dept: CARDIOLOGY | Facility: CLINIC | Age: 77
End: 2022-02-11
Payer: MEDICARE

## 2022-02-11 VITALS
HEIGHT: 69 IN | WEIGHT: 188.25 LBS | HEART RATE: 63 BPM | DIASTOLIC BLOOD PRESSURE: 73 MMHG | SYSTOLIC BLOOD PRESSURE: 133 MMHG | BODY MASS INDEX: 27.88 KG/M2

## 2022-02-11 DIAGNOSIS — I10 ESSENTIAL HYPERTENSION: ICD-10-CM

## 2022-02-11 DIAGNOSIS — I25.118 CORONARY ARTERY DISEASE OF NATIVE ARTERY OF NATIVE HEART WITH STABLE ANGINA PECTORIS: Primary | ICD-10-CM

## 2022-02-11 PROCEDURE — 93010 EKG 12-LEAD: ICD-10-PCS | Mod: S$GLB,,, | Performed by: INTERNAL MEDICINE

## 2022-02-11 PROCEDURE — 99999 PR PBB SHADOW E&M-EST. PATIENT-LVL III: CPT | Mod: PBBFAC,,, | Performed by: PHYSICIAN ASSISTANT

## 2022-02-11 PROCEDURE — 3288F FALL RISK ASSESSMENT DOCD: CPT | Mod: CPTII,S$GLB,, | Performed by: PHYSICIAN ASSISTANT

## 2022-02-11 PROCEDURE — 99499 UNLISTED E&M SERVICE: CPT | Mod: S$GLB,,, | Performed by: PHYSICIAN ASSISTANT

## 2022-02-11 PROCEDURE — 99499 RISK ADDL DX/OHS AUDIT: ICD-10-PCS | Mod: S$GLB,,, | Performed by: PHYSICIAN ASSISTANT

## 2022-02-11 PROCEDURE — 99214 OFFICE O/P EST MOD 30 MIN: CPT | Mod: S$GLB,,, | Performed by: PHYSICIAN ASSISTANT

## 2022-02-11 PROCEDURE — 1101F PR PT FALLS ASSESS DOC 0-1 FALLS W/OUT INJ PAST YR: ICD-10-PCS | Mod: CPTII,S$GLB,, | Performed by: PHYSICIAN ASSISTANT

## 2022-02-11 PROCEDURE — 3078F PR MOST RECENT DIASTOLIC BLOOD PRESSURE < 80 MM HG: ICD-10-PCS | Mod: CPTII,S$GLB,, | Performed by: PHYSICIAN ASSISTANT

## 2022-02-11 PROCEDURE — 3288F PR FALLS RISK ASSESSMENT DOCUMENTED: ICD-10-PCS | Mod: CPTII,S$GLB,, | Performed by: PHYSICIAN ASSISTANT

## 2022-02-11 PROCEDURE — 1126F PR PAIN SEVERITY QUANTIFIED, NO PAIN PRESENT: ICD-10-PCS | Mod: CPTII,S$GLB,, | Performed by: PHYSICIAN ASSISTANT

## 2022-02-11 PROCEDURE — 3075F SYST BP GE 130 - 139MM HG: CPT | Mod: CPTII,S$GLB,, | Performed by: PHYSICIAN ASSISTANT

## 2022-02-11 PROCEDURE — 1126F AMNT PAIN NOTED NONE PRSNT: CPT | Mod: CPTII,S$GLB,, | Performed by: PHYSICIAN ASSISTANT

## 2022-02-11 PROCEDURE — 1159F MED LIST DOCD IN RCRD: CPT | Mod: CPTII,S$GLB,, | Performed by: PHYSICIAN ASSISTANT

## 2022-02-11 PROCEDURE — 1159F PR MEDICATION LIST DOCUMENTED IN MEDICAL RECORD: ICD-10-PCS | Mod: CPTII,S$GLB,, | Performed by: PHYSICIAN ASSISTANT

## 2022-02-11 PROCEDURE — 93005 ELECTROCARDIOGRAM TRACING: CPT | Mod: PO

## 2022-02-11 PROCEDURE — 3078F DIAST BP <80 MM HG: CPT | Mod: CPTII,S$GLB,, | Performed by: PHYSICIAN ASSISTANT

## 2022-02-11 PROCEDURE — 99999 PR PBB SHADOW E&M-EST. PATIENT-LVL III: ICD-10-PCS | Mod: PBBFAC,,, | Performed by: PHYSICIAN ASSISTANT

## 2022-02-11 PROCEDURE — 1101F PT FALLS ASSESS-DOCD LE1/YR: CPT | Mod: CPTII,S$GLB,, | Performed by: PHYSICIAN ASSISTANT

## 2022-02-11 PROCEDURE — 99214 PR OFFICE/OUTPT VISIT, EST, LEVL IV, 30-39 MIN: ICD-10-PCS | Mod: S$GLB,,, | Performed by: PHYSICIAN ASSISTANT

## 2022-02-11 PROCEDURE — 93010 ELECTROCARDIOGRAM REPORT: CPT | Mod: S$GLB,,, | Performed by: INTERNAL MEDICINE

## 2022-02-11 PROCEDURE — 3075F PR MOST RECENT SYSTOLIC BLOOD PRESS GE 130-139MM HG: ICD-10-PCS | Mod: CPTII,S$GLB,, | Performed by: PHYSICIAN ASSISTANT

## 2022-02-11 NOTE — PROGRESS NOTES
"Subjective:    Patient ID:  Evan Garcia is a 76 y.o. male who presents for follow-up of CAD.       HPI  Mr. Garcia is a delightful gentleman who follows with Dr. Chandra. He has a hx of CAD s/p LCx and OM1 ENRIQUE in 2020. He was unfortunately diagnosed with liver cancer recently and has surgery planned at [facility] with Dr. Pepito Dawn on 2/17. He presents today for preoperative evaluation. He is without cardiovascular complaints. No CP, HYMAN, or change in his exercise tolerance. He can easily achieve > 4 METS without difficulty (recently lifted multiple bags of concrete).     Review of Systems   Constitutional: Negative for chills, diaphoresis, fever, weight gain and weight loss.   HENT: Negative for sore throat.    Eyes: Negative for blurred vision, vision loss in left eye, vision loss in right eye and visual disturbance.   Cardiovascular: Negative for chest pain, claudication, dyspnea on exertion, leg swelling, near-syncope, orthopnea, palpitations, paroxysmal nocturnal dyspnea and syncope.   Respiratory: Negative for cough, hemoptysis, shortness of breath, sputum production and wheezing.    Endocrine: Negative for cold intolerance and heat intolerance.   Hematologic/Lymphatic: Negative for adenopathy. Does not bruise/bleed easily.   Skin: Negative for rash.   Musculoskeletal: Negative for falls, muscle weakness and myalgias.   Gastrointestinal: Negative for abdominal pain, change in bowel habit, constipation, diarrhea, melena and nausea.   Genitourinary: Negative for bladder incontinence.   Neurological: Negative for dizziness, focal weakness, headaches, light-headedness, numbness and weakness.   Psychiatric/Behavioral: Negative for altered mental status.         Vitals:    02/11/22 1444   BP: 133/73   BP Location: Left arm   Patient Position: Sitting   BP Method: Large (Automatic)   Pulse: 63   Weight: 85.4 kg (188 lb 4.4 oz)   Height: 5' 9" (1.753 m)   Body mass index is 27.8 kg/m².    Objective:    Physical " Exam  Constitutional:       Appearance: He is well-developed and well-nourished.   HENT:      Head: Normocephalic and atraumatic.   Eyes:      Extraocular Movements: EOM normal.      Pupils: Pupils are equal, round, and reactive to light.   Neck:      Thyroid: No thyromegaly.      Vascular: No JVD.      Trachea: No tracheal deviation.   Cardiovascular:      Rate and Rhythm: Normal rate and regular rhythm.      Chest Wall: PMI is not displaced.      Pulses: Normal pulses and intact distal pulses.      Heart sounds: S1 normal and S2 normal. No murmur heard.  No friction rub. No gallop.    Pulmonary:      Effort: Pulmonary effort is normal. No respiratory distress.      Breath sounds: Normal breath sounds. No wheezing or rales.   Chest:      Chest wall: No tenderness.   Abdominal:      General: Bowel sounds are normal. There is no distension.      Palpations: Abdomen is soft. There is no mass.      Tenderness: There is no abdominal tenderness.   Musculoskeletal:         General: No tenderness or edema. Normal range of motion.      Cervical back: Neck supple.   Skin:     General: Skin is warm and dry.      Findings: No rash.   Neurological:      Mental Status: He is alert and oriented to person, place, and time.   Psychiatric:         Mood and Affect: Mood and affect normal.         Behavior: Behavior normal.     EKG: NSR with 1st degree AVB, no change from previous dated 5/22/20    Last angiogram (1/6/20):  Coronary angiographic findings:  Left main coronary-normal size vessel normal in appearance.  Left anterior descending-normal size vessel with a 40-50% proximal and mid lesion remainder appears normal.  The 1st diagonal long branching bifurcating vessel with the 50% proximal lesion.  Ramus intermedius-small vessel diffuse disease all less than 20%.  Circumflex-normal size vessel with a 70% proximal lesion extending into large branching 1st obtuse marginal.  First obtuse marginal is a 90% proximal lesion.  Right  coronary-normal size dominant vessel diffuse 60% lesions in its proximal portion distally has 50%.     Fractional flow reserve of the LAD was 0.85.  Fraction flow reserve 1st diagonal 0.86.  Fractional reserve of the right coronary 0.84.  Intervention:  Successful angioplasty and stenting of circumflex into the 1st obtuse marginal using a Synergy 2.25 x 20 post dilated to 2 5 was 0% residual stenosis.  Interesting patient recurrence of his anginal with angioplasty and stenting of the circumflex obtuse marginal.     Impression:  Moderate disease seen LAD, diagonal, RCA verified FFR.  Significant lesions in the circumflex into the 1st obtuse marginal successful drug-eluting stenting as above.      Nuclear stress test (12/21/20):  Normal myocardial perfusion scan. There is no evidence of myocardial ischemia or infarction.    There is a  mild to moderate intensity fixed defect in the  wall of the left ventricle secondary to diaphragm attenuation.    The gated perfusion images showed an ejection fraction of 62% at rest. The gated perfusion images showed an ejection fraction of % post stress. Normal ejection fraction is greater than %.    The EKG portion of this study is negative for ischemia.    The patient reported no chest pain during the stress test.    There were no arrhythmias during stress.    Assessment:       Problem List Items Addressed This Visit        Cardiology Problems    Coronary artery disease of native artery of native heart with stable angina pectoris - Primary    Essential hypertension           Plan:       This is a 76 year old gentleman scheduled for liver resection for adenocarcinoma, who is referred for preoperative evaluation.    The patient has no major cardiac contraindications to non-emergent surgery and is scheduled for a time sensitive, low- risk procedure with intermediate (RCRI = 1)-risk clinical cardiac predictors and good (4-10 METs) functional capacity.     Therefore, the cardiac  risk is moderate with 2-7% anticipated cardiovascular events.    The patient requires no further cardiac evaluation  and is already on appropriate medical therapy to reduce the cardiac risk.     Given CAD, would prefer he remain on ASA perioperatively, but, if bleeding risk is too great, hold aspirin 5 days preoperatively and resume postoperatively at 81 mg daily for cardiac and stroke protection once low risk for bleeding.  Hold Plavix 5 days preoperatively and resume postoperatively for cardiac protection as soon as possible once bleeding risk is acceptable.    Continue other cardiac medications.   F/U with Dr. Chandra in 6 months.       Faxed to Dr. Pepito Dawn  Attn: Johanne  [phone]

## 2022-02-14 DIAGNOSIS — I10 ESSENTIAL HYPERTENSION: Primary | ICD-10-CM

## 2022-03-10 ENCOUNTER — PES CALL (OUTPATIENT)
Dept: ADMINISTRATIVE | Facility: CLINIC | Age: 77
End: 2022-03-10
Payer: MEDICARE

## 2022-03-16 NOTE — TELEPHONE ENCOUNTER
No new care gaps identified.  Powered by [employer]. Reference number: [id number].   3/16/2022 12:14:32 AM CDT

## 2022-03-19 NOTE — TELEPHONE ENCOUNTER
This Rx Request does not qualify for assessment with the ORC   Please review protocol details and the Care Due Message for extra clinical information    Reasons Rx Request may be deferred:  Patient has been seen in the ED/Hospital since the last PCP visit    Note composed:12:35 PM 03/19/2022

## 2022-03-20 RX ORDER — ISOSORBIDE MONONITRATE 30 MG/1
TABLET, EXTENDED RELEASE ORAL
Qty: 90 TABLET | Refills: 3 | Status: SHIPPED | OUTPATIENT
Start: 2022-03-20 | End: 2023-02-13

## 2022-04-04 ENCOUNTER — OFFICE VISIT (OUTPATIENT)
Dept: HOME HEALTH SERVICES | Facility: CLINIC | Age: 77
End: 2022-04-04
Payer: MEDICARE

## 2022-04-04 VITALS
OXYGEN SATURATION: 97 % | WEIGHT: 160 LBS | HEART RATE: 72 BPM | DIASTOLIC BLOOD PRESSURE: 70 MMHG | HEIGHT: 69 IN | SYSTOLIC BLOOD PRESSURE: 126 MMHG | BODY MASS INDEX: 23.7 KG/M2

## 2022-04-04 DIAGNOSIS — Z00.00 ENCOUNTER FOR PREVENTIVE HEALTH EXAMINATION: Primary | ICD-10-CM

## 2022-04-04 DIAGNOSIS — N40.0 BENIGN PROSTATIC HYPERPLASIA WITHOUT LOWER URINARY TRACT SYMPTOMS: ICD-10-CM

## 2022-04-04 DIAGNOSIS — M51.36 DDD (DEGENERATIVE DISC DISEASE), LUMBAR: ICD-10-CM

## 2022-04-04 DIAGNOSIS — I25.118 CORONARY ARTERY DISEASE OF NATIVE ARTERY OF NATIVE HEART WITH STABLE ANGINA PECTORIS: ICD-10-CM

## 2022-04-04 DIAGNOSIS — N18.31 STAGE 3A CHRONIC KIDNEY DISEASE: ICD-10-CM

## 2022-04-04 DIAGNOSIS — I10 ESSENTIAL HYPERTENSION: ICD-10-CM

## 2022-04-04 DIAGNOSIS — C22.1 INTRAHEPATIC CHOLANGIOCARCINOMA: ICD-10-CM

## 2022-04-04 PROCEDURE — G0439 PPPS, SUBSEQ VISIT: HCPCS | Mod: S$GLB,,, | Performed by: NURSE PRACTITIONER

## 2022-04-04 PROCEDURE — 3288F FALL RISK ASSESSMENT DOCD: CPT | Mod: CPTII,S$GLB,, | Performed by: NURSE PRACTITIONER

## 2022-04-04 PROCEDURE — 3288F PR FALLS RISK ASSESSMENT DOCUMENTED: ICD-10-PCS | Mod: CPTII,S$GLB,, | Performed by: NURSE PRACTITIONER

## 2022-04-04 PROCEDURE — 1160F PR REVIEW ALL MEDS BY PRESCRIBER/CLIN PHARMACIST DOCUMENTED: ICD-10-PCS | Mod: CPTII,S$GLB,, | Performed by: NURSE PRACTITIONER

## 2022-04-04 PROCEDURE — 3078F PR MOST RECENT DIASTOLIC BLOOD PRESSURE < 80 MM HG: ICD-10-PCS | Mod: CPTII,S$GLB,, | Performed by: NURSE PRACTITIONER

## 2022-04-04 PROCEDURE — 1100F PR PT FALLS ASSESS DOC 2+ FALLS/FALL W/INJURY/YR: ICD-10-PCS | Mod: CPTII,S$GLB,, | Performed by: NURSE PRACTITIONER

## 2022-04-04 PROCEDURE — 3074F PR MOST RECENT SYSTOLIC BLOOD PRESSURE < 130 MM HG: ICD-10-PCS | Mod: CPTII,S$GLB,, | Performed by: NURSE PRACTITIONER

## 2022-04-04 PROCEDURE — 1100F PTFALLS ASSESS-DOCD GE2>/YR: CPT | Mod: CPTII,S$GLB,, | Performed by: NURSE PRACTITIONER

## 2022-04-04 PROCEDURE — 3074F SYST BP LT 130 MM HG: CPT | Mod: CPTII,S$GLB,, | Performed by: NURSE PRACTITIONER

## 2022-04-04 PROCEDURE — 1159F MED LIST DOCD IN RCRD: CPT | Mod: CPTII,S$GLB,, | Performed by: NURSE PRACTITIONER

## 2022-04-04 PROCEDURE — 99499 UNLISTED E&M SERVICE: CPT | Mod: S$GLB,,, | Performed by: NURSE PRACTITIONER

## 2022-04-04 PROCEDURE — 3078F DIAST BP <80 MM HG: CPT | Mod: CPTII,S$GLB,, | Performed by: NURSE PRACTITIONER

## 2022-04-04 PROCEDURE — 99499 RISK ADDL DX/OHS AUDIT: ICD-10-PCS | Mod: S$GLB,,, | Performed by: NURSE PRACTITIONER

## 2022-04-04 PROCEDURE — 1160F RVW MEDS BY RX/DR IN RCRD: CPT | Mod: CPTII,S$GLB,, | Performed by: NURSE PRACTITIONER

## 2022-04-04 PROCEDURE — 1159F PR MEDICATION LIST DOCUMENTED IN MEDICAL RECORD: ICD-10-PCS | Mod: CPTII,S$GLB,, | Performed by: NURSE PRACTITIONER

## 2022-04-04 PROCEDURE — G0439 PR MEDICARE ANNUAL WELLNESS SUBSEQUENT VISIT: ICD-10-PCS | Mod: S$GLB,,, | Performed by: NURSE PRACTITIONER

## 2022-04-05 PROBLEM — N18.31 STAGE 3A CHRONIC KIDNEY DISEASE: Status: ACTIVE | Noted: 2022-04-05

## 2022-04-05 PROBLEM — C22.1 INTRAHEPATIC CHOLANGIOCARCINOMA: Status: ACTIVE | Noted: 2022-03-22

## 2022-04-05 PROBLEM — N40.0 BENIGN PROSTATIC HYPERPLASIA WITHOUT LOWER URINARY TRACT SYMPTOMS: Status: ACTIVE | Noted: 2022-04-05

## 2022-04-05 NOTE — PATIENT INSTRUCTIONS
Counseling and Referral of Other Preventative  (Italic type indicates deductible and co-insurance are waived)    Patient Name: Evan Garcia  Today's Date: 4/5/2022    Health Maintenance       Date Due Completion Date    Colorectal Cancer Screening 12/31/2020 12/31/2010 (Done)    Override on 12/31/2010: Done (two polyps)    Shingles Vaccine (3 of 3) 01/20/2021 11/25/2020    Aspirin/Antiplatelet Therapy 04/04/2023 4/4/2022    Lipid Panel 11/17/2026 11/17/2021    TETANUS VACCINE 03/07/2027 3/7/2017        No orders of the defined types were placed in this encounter.    The following information is provided to all patients.  This information is to help you find resources for any of the problems found today that may be affecting your health:                Living healthy guide: www.[facility].gov      Understanding Diabetes: www.diabetes.org      Eating healthy: www.cdc.gov/healthyweight      [place] home safety checklist: www.cdc.gov/steadi/patient.html      Agency on Aging: www.goea.[place].[facility]      Alcoholics anonymous (AA): www.aa.org      Physical Activity: www.piter.nih.gov/kz2exhx      Tobacco use: www.quitwithusla.org

## 2022-04-05 NOTE — PROGRESS NOTES
"  Evan Garcia presented for a  Medicare AWV and comprehensive Health Risk Assessment today. The following components were reviewed and updated:    · Medical history  · Family History  · Social history  · Allergies and Current Medications  · Health Risk Assessment  · Health Maintenance  · Care Team         ** See Completed Assessments for Annual Wellness Visit within the encounter summary.**         The following assessments were completed:  · Living Situation  · CAGE  · Depression Screening  · Timed Get Up and Go  · Whisper Test  · Cognitive Function Screening  · Nutrition Screening  · ADL Screening  · PAQ Screening        Vitals:    04/04/22 0901   BP: 126/70   Pulse: 72   SpO2: 97%   Weight: 72.6 kg (160 lb)   Height: 5' 9" (1.753 m)     Body mass index is 23.63 kg/m².  Physical Exam  Constitutional:       Appearance: Normal appearance.   HENT:      Head: Normocephalic and atraumatic.      Nose: Nose normal.   Eyes:      Extraocular Movements: Extraocular movements intact.      Pupils: Pupils are equal, round, and reactive to light.   Cardiovascular:      Rate and Rhythm: Normal rate and regular rhythm.      Pulses: Normal pulses.      Heart sounds: Normal heart sounds.   Pulmonary:      Effort: Pulmonary effort is normal.      Breath sounds: Normal breath sounds.   Musculoskeletal:      Cervical back: Normal range of motion and neck supple.   Neurological:      General: No focal deficit present.      Mental Status: He is alert and oriented to person, place, and time.               Diagnoses and health risks identified today and associated recommendations/orders:    1. Encounter for preventive health examination  awv completed      2. Stage 3a chronic kidney disease  Chronic and stable. Continue current treatment. Follow with PCP.  Stable - followed with labs     3. Intrahepatic cholangiocarcinoma  Chronic and stable. Continue current treatment. Follow with PCP.  Recently resected mass   Follows with " heme/onc    4. Coronary artery disease of native artery of native heart with stable angina pectoris  Chronic and stable. Continue current treatment. Follow with PCP.  Follows with cardiology     5. Essential hypertension  Chronic and stable. Continue current treatment. Follow with PCP.  On medications      6. DDD (degenerative disc disease), lumbar  Chronic and stable. Continue current treatment. Follow with PCP.      7. Benign prostatic hyperplasia without lower urinary tract symptoms  Chronic and stable. Continue current treatment. Follow with PCP.  On finesteride- stable      Provided Evan with a 5-10 year written screening schedule and personal prevention plan. Recommendations were developed using the USPSTF age appropriate recommendations. Education, counseling, and referrals were provided as needed. After Visit Summary printed and given to patient which includes a list of additional screenings\tests needed.    Fu in 1 yr for JASVIR Judge NP  I offered to discuss advanced care planning, including how to pick a person who would make decisions for you if you were unable to make them for yourself, called a health care power of , and what kind of decisions you might make such as use of life sustaining treatments such as ventilators and tube feeding when faced with a life limiting illness recorded on a living will that they will need to know. (How you want to be cared for as you near the end of your natural life)     X Patient is interested in learning more about how to make advanced directives.  I provided them paperwork and offered to discuss this with them.

## 2022-04-13 ENCOUNTER — TELEPHONE (OUTPATIENT)
Dept: NEUROLOGY | Facility: HOSPITAL | Age: 77
End: 2022-04-13
Payer: MEDICARE

## 2022-04-13 ENCOUNTER — PATIENT OUTREACH (OUTPATIENT)
Dept: ADMINISTRATIVE | Facility: CLINIC | Age: 77
End: 2022-04-13
Payer: MEDICARE

## 2022-04-13 ENCOUNTER — EXTERNAL HOSPITAL ADMISSION (OUTPATIENT)
Dept: ADMINISTRATIVE | Facility: CLINIC | Age: 77
End: 2022-04-13
Payer: MEDICARE

## 2022-04-13 DIAGNOSIS — K75.0 LIVER ABSCESS: Primary | ICD-10-CM

## 2022-04-13 NOTE — TELEPHONE ENCOUNTER
----- Message from Hal Barajas sent at 4/13/2022 11:21 AM CDT -----  Contact: [phone]/self  Who Called: PT  Regarding: pt needs tube out was released from hospital   Would the patient rather a call back or a response via [employer]? Call back  Best Call Back Number: [phone]  Additional Information: n/a

## 2022-04-13 NOTE — PROGRESS NOTES
C3 nurse spoke with Evan Garcia for a [facility]- post hospital discharge follow up call. The patient does not have a scheduled HOSFU appointment with Zion Yao MD within 7 days post hospital discharge date 4/12/2022 From [facility]. C3 nurse was unable to schedule HOSFU appointment in [facility] with Zion Yao MD.  Referral to [facility] placed.  [facility] referral placed.    Medication from [facility] discharge:  Saccharomyces boulardii (FLORASTOR) 250 mg capsule    Indications: Liver abscess and sequelae of chronic liver disease Take 1 capsule 2 (two) times daily by mouth for 10 days   piperacillin-tazobactam (ZOSYN) 3.375 g/ 50 mL (ADD-a-vial)    Indications: liver abscess Inject 3.375 g every 8 (eight) hours into the vein for 10 days Indications: collection of pus or abscess on or in the liver   fluconazole (DIFLUCAN) 200 MG tablet    Indications: prevention of disseminated candidiasis Take 1 tablet daily by mouth for 10 days Indications: treatment for prevention of disseminated candidiasis   ciprofloxacin HCl (CIPRO) 750 MG tablet    Indications: infectious disease of abdomen Take 1 tablet every 12 (twelve) hours by mouth for 10 days Indications: infection within the abdomen       potassium chloride (KLOR-CON 10) 10 MEQ CR tablet    Indications: Hypokalemia Take 2 tablets daily by mouth

## 2022-04-13 NOTE — PATIENT INSTRUCTIONS
Stacy teaching reviewed with Evan Garcia . He verbalized understanding.    Education was provided based on the patient's discharge diagnosis using the attached Stacy patient education as a reference.

## 2022-04-13 NOTE — TELEPHONE ENCOUNTER
----- Message from Hal Barajas sent at 4/13/2022 11:21 AM CDT -----  Contact: [phone] /self  Who Called: PT  Regarding: schedule appt   Would the patient rather a call back or a response via [employer]? Call back  Best Call Back Number: [phone]   Additional Information: n/a

## 2022-04-13 NOTE — TELEPHONE ENCOUNTER
Pt was seen in [facility].  Discharged yesterday with drain.  Needs edwin in 1 wk.  Appointment made.

## 2022-04-13 NOTE — TELEPHONE ENCOUNTER
I spoke with Marti Evan and have him scheduled for 4/28/22 for a hospital follow up from  hospital Discharged 4/12/22

## 2022-04-20 ENCOUNTER — PES CALL (OUTPATIENT)
Dept: ADMINISTRATIVE | Facility: CLINIC | Age: 77
End: 2022-04-20
Payer: MEDICARE

## 2022-04-21 ENCOUNTER — PES CALL (OUTPATIENT)
Dept: ADMINISTRATIVE | Facility: CLINIC | Age: 77
End: 2022-04-21
Payer: MEDICARE

## 2022-04-28 ENCOUNTER — OFFICE VISIT (OUTPATIENT)
Dept: FAMILY MEDICINE | Facility: CLINIC | Age: 77
End: 2022-04-28
Payer: MEDICARE

## 2022-04-28 VITALS
BODY MASS INDEX: 24.75 KG/M2 | HEART RATE: 71 BPM | WEIGHT: 167.13 LBS | DIASTOLIC BLOOD PRESSURE: 70 MMHG | TEMPERATURE: 98 F | HEIGHT: 69 IN | SYSTOLIC BLOOD PRESSURE: 122 MMHG | OXYGEN SATURATION: 97 %

## 2022-04-28 DIAGNOSIS — I25.10 CORONARY ARTERY DISEASE INVOLVING NATIVE CORONARY ARTERY OF NATIVE HEART WITHOUT ANGINA PECTORIS: ICD-10-CM

## 2022-04-28 DIAGNOSIS — N40.0 BENIGN NON-NODULAR PROSTATIC HYPERPLASIA WITHOUT LOWER URINARY TRACT SYMPTOMS: ICD-10-CM

## 2022-04-28 DIAGNOSIS — H93.13 TINNITUS OF BOTH EARS: ICD-10-CM

## 2022-04-28 DIAGNOSIS — C22.1 INTRAHEPATIC CHOLANGIOCARCINOMA: Primary | ICD-10-CM

## 2022-04-28 DIAGNOSIS — I10 ESSENTIAL HYPERTENSION: ICD-10-CM

## 2022-04-28 PROCEDURE — 3074F PR MOST RECENT SYSTOLIC BLOOD PRESSURE < 130 MM HG: ICD-10-PCS | Mod: CPTII,S$GLB,, | Performed by: FAMILY MEDICINE

## 2022-04-28 PROCEDURE — 1126F AMNT PAIN NOTED NONE PRSNT: CPT | Mod: CPTII,S$GLB,, | Performed by: FAMILY MEDICINE

## 2022-04-28 PROCEDURE — 1159F PR MEDICATION LIST DOCUMENTED IN MEDICAL RECORD: ICD-10-PCS | Mod: CPTII,S$GLB,, | Performed by: FAMILY MEDICINE

## 2022-04-28 PROCEDURE — 99214 OFFICE O/P EST MOD 30 MIN: CPT | Mod: S$GLB,,, | Performed by: FAMILY MEDICINE

## 2022-04-28 PROCEDURE — 1159F MED LIST DOCD IN RCRD: CPT | Mod: CPTII,S$GLB,, | Performed by: FAMILY MEDICINE

## 2022-04-28 PROCEDURE — 1126F PR PAIN SEVERITY QUANTIFIED, NO PAIN PRESENT: ICD-10-PCS | Mod: CPTII,S$GLB,, | Performed by: FAMILY MEDICINE

## 2022-04-28 PROCEDURE — 3078F DIAST BP <80 MM HG: CPT | Mod: CPTII,S$GLB,, | Performed by: FAMILY MEDICINE

## 2022-04-28 PROCEDURE — 1101F PT FALLS ASSESS-DOCD LE1/YR: CPT | Mod: CPTII,S$GLB,, | Performed by: FAMILY MEDICINE

## 2022-04-28 PROCEDURE — 3288F FALL RISK ASSESSMENT DOCD: CPT | Mod: CPTII,S$GLB,, | Performed by: FAMILY MEDICINE

## 2022-04-28 PROCEDURE — 99214 PR OFFICE/OUTPT VISIT, EST, LEVL IV, 30-39 MIN: ICD-10-PCS | Mod: S$GLB,,, | Performed by: FAMILY MEDICINE

## 2022-04-28 PROCEDURE — 1101F PR PT FALLS ASSESS DOC 0-1 FALLS W/OUT INJ PAST YR: ICD-10-PCS | Mod: CPTII,S$GLB,, | Performed by: FAMILY MEDICINE

## 2022-04-28 PROCEDURE — 99999 PR PBB SHADOW E&M-EST. PATIENT-LVL III: CPT | Mod: PBBFAC,,, | Performed by: FAMILY MEDICINE

## 2022-04-28 PROCEDURE — 99999 PR PBB SHADOW E&M-EST. PATIENT-LVL III: ICD-10-PCS | Mod: PBBFAC,,, | Performed by: FAMILY MEDICINE

## 2022-04-28 PROCEDURE — 3288F PR FALLS RISK ASSESSMENT DOCUMENTED: ICD-10-PCS | Mod: CPTII,S$GLB,, | Performed by: FAMILY MEDICINE

## 2022-04-28 PROCEDURE — 3078F PR MOST RECENT DIASTOLIC BLOOD PRESSURE < 80 MM HG: ICD-10-PCS | Mod: CPTII,S$GLB,, | Performed by: FAMILY MEDICINE

## 2022-04-28 PROCEDURE — 3074F SYST BP LT 130 MM HG: CPT | Mod: CPTII,S$GLB,, | Performed by: FAMILY MEDICINE

## 2022-04-28 RX ORDER — POTASSIUM CHLORIDE 750 MG/1
10 TABLET, FILM COATED, EXTENDED RELEASE ORAL DAILY
COMMUNITY
Start: 2022-04-08 | End: 2023-08-04 | Stop reason: CLARIF

## 2022-04-28 RX ORDER — ALPRAZOLAM 0.5 MG/1
0.5 TABLET ORAL 2 TIMES DAILY PRN
Qty: 30 TABLET | Refills: 5 | Status: SHIPPED | OUTPATIENT
Start: 2022-04-28 | End: 2022-10-28 | Stop reason: SDUPTHER

## 2022-04-28 NOTE — PROGRESS NOTES
Subjective:       Patient ID: Eavn Garcia is a 76 y.o. male.    Chief Complaint: Follow-up (Hosp f/u )    HPI Comments: Here today for f/u on recent hospitalization to [facility]    He was admitted after liver abscesses developed as complication of partial liver resection for liver cancer.  He completed the home antibiotic regimen through the PICC.  Home health has stopped.  He is following with surgical oncology, Dr. Kaminski  He has a f/u with Dr. Johnson, infectious disease next week.  Denies fever  Lost 25 pounds in the last 3 months    Main complaint since hospitalization is feeling solid foods getting stuck on swallowing  This has been consistent since the hospitalization      Liver cancer - following with oncology at [facility].  HTN - tolerating amlodipine, atenolol, chlorthalidone. BPs at home within normal range.  BPH - tolerating Proscar daily; urine flow has been good  Tinnitus- using Xanax at bedtime to sleep. This is continuous.  Vertigo still happens most day when looking upwards and after getting up in AM  CAD s/p stent (stenting of circumflex into the 1st obtuse marginal) - asa 81mg, plavix 75mg daily  Cannot tolerate statins due to myopathy      Past Medical History:   Diagnosis Date    Ankylosing spondylitis     thoracic;     BPH (benign prostatic hypertrophy)     CAD (coronary artery disease)     Cholelithiases     DDD (degenerative disc disease), lumbar     treated with FILOMENA with success    Fatty liver     History of colonic polyps     HTN (hypertension)     Hypertriglyceridemia     Intrahepatic cholangiocarcinoma 3/22/2022    Left kidney mass     followed by urology, Dr. De Guzman    Tinnitus of both ears     he consulted with ENT; He uses Xanax PRn    Wears glasses        Past Surgical History:   Procedure Laterality Date    CHOLECYSTECTOMY      CORONARY ANGIOGRAPHY  01/06/2020    Procedure: ANGIOGRAM, CORONARY ARTERY;  Surgeon: Uday Tripp MD;  Location: [facility] C[facility];  Service:  Cardiology;;    coronary artery stent  01/06/2020    HERNIA REPAIR      KNEE SURGERY      right; 3 separate surgeries to repair patellar fracture    partial liver resection  due to liver mass      stapendectomy Bilateral 1985    bilateraly    WISDOM TOOTH EXTRACTION         Allergies   Allergen Reactions    Sulfa (Sulfonamide Antibiotics) Hives and Other (See Comments)     Decreased white blood count per pt       Social History     Socioeconomic History    Marital status:     Number of children: 3   Occupational History    Occupation: retired refinery   Tobacco Use    Smoking status: Former Smoker     Packs/day: 0.50     Years: 20.00     Pack years: 10.00     Types: Cigars, Cigarettes     Start date: 4/24/2014    Smokeless tobacco: Never Used   Substance and Sexual Activity    Alcohol use: Not Currently    Drug use: No   Social History Narrative    Hobbies: fish, hunt        Exercise: [employer] 3 times a week with treadmill         Social Determinants of Health     Financial Resource Strain: Low Risk     Difficulty of Paying Living Expenses: Not hard at all   Food Insecurity: No Food Insecurity    Worried About Running Out of Food in the Last Year: Never true    Ran Out of Food in the Last Year: Never true   Transportation Needs: No Transportation Needs    Lack of Transportation (Medical): No    Lack of Transportation (Non-Medical): No   Physical Activity: Insufficiently Active    Days of Exercise per Week: 3 days    Minutes of Exercise per Session: 10 min   Stress: No Stress Concern Present    Feeling of Stress : Only a little   Social Connections: Moderately Isolated    Frequency of Communication with Friends and Family: Three times a week    Frequency of Social Gatherings with Friends and Family: Once a week    Attends Mormon Services: More than 4 times per year    Active Member of Clubs or Organizations: No    Attends Club or Organization Meetings: Never    Marital Status:     Housing Stability: Unknown    Unstable Housing in the Last Year: No       Current Outpatient Medications on File Prior to Visit   Medication Sig Dispense Refill    amLODIPine (NORVASC) 10 MG tablet TAKE 1 TABLET BY MOUTH EVERY DAY 90 tablet 3    aspirin (ECOTRIN) 81 MG EC tablet Take 81 mg by mouth once daily.      atenoloL (TENORMIN) 50 MG tablet Take 1 tablet (50 mg total) by mouth once daily. 90 tablet 3    chlorthalidone (HYGROTEN) 25 MG Tab One-half tablet po daily 45 tablet 3    clopidogreL (PLAVIX) 75 mg tablet Take 1 tablet (75 mg total) by mouth once daily. 90 tablet 3    finasteride (PROSCAR) 5 mg tablet Take 1 tablet (5 mg total) by mouth once daily. 90 tablet 3    isosorbide mononitrate (IMDUR) 30 MG 24 hr tablet TAKE 1 TABLET BY MOUTH EVERY DAY (Patient taking differently: 30 mg. He is taking as needed) 90 tablet 3    KLOR-CON 10 10 mEq TbSR TAKE 2 TABLETS ONCE BY MOUTH FOR 1 DOSE      [DISCONTINUED] ALPRAZolam (XANAX) 0.5 MG tablet Take 1 tablet (0.5 mg total) by mouth 2 (two) times daily as needed (tinitus). 30 tablet 5    [DISCONTINUED] acetaminophen (TYLENOL) 325 MG tablet Take 1 tablet (325 mg total) by mouth every 6 (six) hours as needed for Pain. (Patient not taking: Reported on 4/28/2022)       No current facility-administered medications on file prior to visit.       Family History   Problem Relation Age of Onset    Aneurysm Father     Cancer Mother         Lymphoma    Diabetes Mother     Melanoma Mother     Cancer Brother         salivary    Cancer Brother         Lymphoma     Review of Systems   Constitutional: Negative for fever, appetite change, fatigue and unexpected weight change.   HENT: Negative for ear pain, nosebleeds, congestion, sore throat, rhinorrhea, trouble swallowing, neck pain, postnasal drip, sinus pressure and ear discharge.  + dizziness  Respiratory: Negative for apnea, cough, chest tightness, shortness of breath and wheezing.    Cardiovascular:  "Negative for palpitations and leg swelling.   Gastrointestinal: Negative for nausea, vomiting, abdominal pain, diarrhea, constipation, blood in stool and abdominal distention.   Genitourinary: Negative for dysuria, urgency, frequency, hematuria, flank pain, scrotal swelling, difficulty urinating and testicular pain.   Skin: Negative for color change, rash and wound.   Neurological: Negative for dizziness, tremors, seizures, syncope, speech difficulty, weakness, light-headedness, numbness and headaches.   Hematological: Negative for adenopathy. Does not bruise/bleed easily.   Psychiatric/Behavioral: Negative for suicidal ideas, hallucinations, behavioral problems and confusion.       Objective:      /70   Pulse 71   Temp 98.1 °F (36.7 °C) (Oral)   Ht 5' 9" (1.753 m)   Wt 75.8 kg (167 lb 1.7 oz)   SpO2 97%   BMI 24.68 kg/m²   Physical Exam   Constitutional: He is oriented to person, place, and time. He appears well-developed and well-nourished. He is active and cooperative.   Nose: Nose normal.   Eyelid hypertrophy bilaterally  Mouth/Throat: Oropharynx is clear and moist. No oropharyngeal exudate.   Eyes: Conjunctivae and EOM are normal. Pupils are equal, round, and reactive to light. Right eye exhibits no discharge. Left eye exhibits no discharge. No scleral icterus.   Neck: Trachea normal, normal range of motion and full passive range of motion without pain. Neck supple. Normal carotid pulses and no JVD present. Carotid bruit is not present. No tracheal deviation present. No mass and no thyromegaly present.   Cardiovascular: Normal rate, regular rhythm, S1 normal, S2 normal, normal heart sounds and intact distal pulses.  Exam reveals no gallop and no friction rub.  No murmur heard.  Pulses:       Carotid pulses are 2+ on the right side, and 2+ on the left side.       Radial pulses are 2+ on the right side, and 2+ on the left side.        Dorsalis pedis pulses are 2+ on the right side, and 2+ on the left " side.   Pulmonary/Chest: Effort normal and breath sounds normal. No respiratory distress. He has no wheezes. He has no rales.   ABD: benign; no palpable masses  Lymphadenopathy:        Head (right side): No tonsillar adenopathy present.        Head (left side): No tonsillar adenopathy present.     He has no cervical adenopathy.   Neurological: He is alert and oriented to person, place, and time. He has normal strength. No cranial nerve deficit. Coordination normal.   Psychiatric: He has a normal mood and affect. His speech is normal and behavior is normal. Judgment and thought content normal.           Results for orders placed or performed in visit on 04/18/22   CBC Without Differential   Result Value Ref Range    WBC 7.54 3.90 - 12.70 K/uL    RBC 4.30 (L) 4.60 - 6.20 M/uL    Hemoglobin 12.0 (L) 14.0 - 18.0 g/dL    Hematocrit 38.0 (L) 40.0 - 54.0 %    MCV 88 82 - 98 fL    MCH 27.9 27.0 - 31.0 pg    MCHC 31.6 (L) 32.0 - 36.0 g/dL    RDW 13.9 11.5 - 14.5 %    Platelets 279 150 - 450 K/uL    MPV 10.8 9.2 - 12.9 fL   C-reactive protein   Result Value Ref Range    CRP 0.90 0.00 - 0.90 mg/dL   Comprehensive Metabolic Panel   Result Value Ref Range    Sodium 142 136 - 145 mmol/L    Potassium 3.7 3.5 - 5.1 mmol/L    Chloride 109 95 - 110 mmol/L    CO2 25 22 - 31 mmol/L    Glucose 106 70 - 110 mg/dL    BUN 16 9 - 21 mg/dL    Creatinine 1.15 0.50 - 1.40 mg/dL    Calcium 9.3 8.4 - 10.2 mg/dL    Total Protein 8.6 (H) 6.0 - 8.4 g/dL    Albumin 4.2 3.5 - 5.2 g/dL    Total Bilirubin 0.5 0.2 - 1.3 mg/dL    Alkaline Phosphatase 60 38 - 145 U/L    AST 55 17 - 59 U/L    ALT 29 0 - 50 U/L    Anion Gap 8 8 - 16 mmol/L    eGFR if African American >60 >60 mL/min/1.73 m^2    eGFR if non African American >60 >60 mL/min/1.73 m^2     Outside CT abdomen reviewed    Assessment:       1. Intrahepatic cholangiocarcinoma    2. Benign non-nodular prostatic hyperplasia without lower urinary tract symptoms    3. Coronary artery disease involving  native coronary artery of native heart without angina pectoris    4. Essential hypertension    5. Tinnitus of both ears        Plan:       Intrahepatic cholangiocarcinoma    Benign non-nodular prostatic hyperplasia without lower urinary tract symptoms    Coronary artery disease involving native coronary artery of native heart without angina pectoris    Essential hypertension    Tinnitus of both ears  -     ALPRAZolam (XANAX) 0.5 MG tablet; Take 1 tablet (0.5 mg total) by mouth 2 (two) times daily as needed (tinitus).  Dispense: 30 tablet; Refill: 5         Discussed EGD and he will discuss this with his surgical oncologist to coordinate at [facility]  Continue present meds  Counseled on regular exercise, maintenance of a healthy weight, balanced diet rich in fruits/vegetables and lean protein, and avoidance of unhealthy habits like smoking and excessive alcohol intake.  Continue low fat diet  Xanax at bedtime for tinnitus  F/u 6 months to refill xanax or PRN

## 2022-07-11 ENCOUNTER — PATIENT OUTREACH (OUTPATIENT)
Dept: ADMINISTRATIVE | Facility: OTHER | Age: 77
End: 2022-07-11
Payer: MEDICARE

## 2022-07-11 NOTE — PROGRESS NOTES
CHW - Case Closure    This Community Health Worker spoke to patient via telephone today.   Pt/Caregiver reported: pt stated they don't need any resources right now.  Pt/Caregiver denied any additional needs at this time and agrees with episode closure at this time.  Provided patient with Community Health Worker's contact information and encouraged him/her to contact this Community Health Worker if additional needs arise.

## 2022-07-15 ENCOUNTER — PATIENT OUTREACH (OUTPATIENT)
Dept: ADMINISTRATIVE | Facility: HOSPITAL | Age: 77
End: 2022-07-15
Payer: MEDICARE

## 2022-07-15 NOTE — LETTER
July 15, 2022    Evan Garcia  [address] [place] [address]             [facility]  [address]  [facility] [address]  Phone: [phone] Dear Leon Cacibauda [facility] is committed to your overall health and regular health care screening is essential to maintaining good health.     Our records indicate you may be due for a Colon Cancer Screening.  A Colonoscopy is a test to view the lower digestive tract (colon and rectum) to look for colon cancer.  Colorectal cancer (cancer in the colon or rectum) is a leading cause of cancer deaths in the [place]  Because colorectal cancer rarely causes symptoms in its early stages, screening for the disease is important.  I will be able to assist you in getting this recommended procedure started.     If you have had a Colon Cancer Screening performed at another facility, please let us know so that we may request your records and update your chart.    I will be happy to assist you with scheduling any necessary appointments or you may contact the [facility] appointment desk at [phone] to schedule at your convenience.     Sincerely,    Zion Yao MD and your [facility]

## 2022-07-30 DIAGNOSIS — N40.0 BENIGN NON-NODULAR PROSTATIC HYPERPLASIA WITHOUT LOWER URINARY TRACT SYMPTOMS: ICD-10-CM

## 2022-07-30 NOTE — TELEPHONE ENCOUNTER
No new care gaps identified.  [facility] Embedded Care Gaps. Reference number: [id number]. 7/30/2022   11:41:18 AM JIMMYT

## 2022-08-01 RX ORDER — FINASTERIDE 5 MG/1
TABLET, FILM COATED ORAL
Qty: 90 TABLET | Refills: 2 | Status: SHIPPED | OUTPATIENT
Start: 2022-08-01 | End: 2023-04-28 | Stop reason: SDUPTHER

## 2022-08-01 NOTE — TELEPHONE ENCOUNTER
Refill Decision Note   Evan Garcia  is requesting a refill authorization.  Brief Assessment and Rationale for Refill:  Approve     Medication Therapy Plan:       Medication Reconciliation Completed: No   Comments:     No Care Gaps recommended.     Note composed:10:56 AM 08/01/2022

## 2022-10-28 ENCOUNTER — OFFICE VISIT (OUTPATIENT)
Dept: FAMILY MEDICINE | Facility: CLINIC | Age: 77
End: 2022-10-28
Payer: MEDICARE

## 2022-10-28 VITALS
DIASTOLIC BLOOD PRESSURE: 60 MMHG | HEART RATE: 63 BPM | HEIGHT: 69 IN | RESPIRATION RATE: 18 BRPM | TEMPERATURE: 98 F | WEIGHT: 180.13 LBS | SYSTOLIC BLOOD PRESSURE: 132 MMHG | OXYGEN SATURATION: 95 % | BODY MASS INDEX: 26.68 KG/M2

## 2022-10-28 DIAGNOSIS — I25.10 CORONARY ARTERY DISEASE INVOLVING NATIVE CORONARY ARTERY OF NATIVE HEART WITHOUT ANGINA PECTORIS: ICD-10-CM

## 2022-10-28 DIAGNOSIS — H93.13 TINNITUS OF BOTH EARS: ICD-10-CM

## 2022-10-28 DIAGNOSIS — I10 ESSENTIAL HYPERTENSION: ICD-10-CM

## 2022-10-28 DIAGNOSIS — R10.32 LLQ ABDOMINAL PAIN: Primary | ICD-10-CM

## 2022-10-28 PROCEDURE — 1160F RVW MEDS BY RX/DR IN RCRD: CPT | Mod: CPTII,S$GLB,, | Performed by: FAMILY MEDICINE

## 2022-10-28 PROCEDURE — 1159F MED LIST DOCD IN RCRD: CPT | Mod: CPTII,S$GLB,, | Performed by: FAMILY MEDICINE

## 2022-10-28 PROCEDURE — G0008 FLU VACCINE - QUADRIVALENT - ADJUVANTED: ICD-10-PCS | Mod: S$GLB,,, | Performed by: FAMILY MEDICINE

## 2022-10-28 PROCEDURE — 1160F PR REVIEW ALL MEDS BY PRESCRIBER/CLIN PHARMACIST DOCUMENTED: ICD-10-PCS | Mod: CPTII,S$GLB,, | Performed by: FAMILY MEDICINE

## 2022-10-28 PROCEDURE — 99999 PR PBB SHADOW E&M-EST. PATIENT-LVL III: ICD-10-PCS | Mod: PBBFAC,,, | Performed by: FAMILY MEDICINE

## 2022-10-28 PROCEDURE — 99214 OFFICE O/P EST MOD 30 MIN: CPT | Mod: S$GLB,,, | Performed by: FAMILY MEDICINE

## 2022-10-28 PROCEDURE — 3075F SYST BP GE 130 - 139MM HG: CPT | Mod: CPTII,S$GLB,, | Performed by: FAMILY MEDICINE

## 2022-10-28 PROCEDURE — 1159F PR MEDICATION LIST DOCUMENTED IN MEDICAL RECORD: ICD-10-PCS | Mod: CPTII,S$GLB,, | Performed by: FAMILY MEDICINE

## 2022-10-28 PROCEDURE — 1101F PT FALLS ASSESS-DOCD LE1/YR: CPT | Mod: CPTII,S$GLB,, | Performed by: FAMILY MEDICINE

## 2022-10-28 PROCEDURE — 3078F PR MOST RECENT DIASTOLIC BLOOD PRESSURE < 80 MM HG: ICD-10-PCS | Mod: CPTII,S$GLB,, | Performed by: FAMILY MEDICINE

## 2022-10-28 PROCEDURE — 90694 FLU VACCINE - QUADRIVALENT - ADJUVANTED: ICD-10-PCS | Mod: S$GLB,,, | Performed by: FAMILY MEDICINE

## 2022-10-28 PROCEDURE — 99214 PR OFFICE/OUTPT VISIT, EST, LEVL IV, 30-39 MIN: ICD-10-PCS | Mod: S$GLB,,, | Performed by: FAMILY MEDICINE

## 2022-10-28 PROCEDURE — 3288F PR FALLS RISK ASSESSMENT DOCUMENTED: ICD-10-PCS | Mod: CPTII,S$GLB,, | Performed by: FAMILY MEDICINE

## 2022-10-28 PROCEDURE — 3075F PR MOST RECENT SYSTOLIC BLOOD PRESS GE 130-139MM HG: ICD-10-PCS | Mod: CPTII,S$GLB,, | Performed by: FAMILY MEDICINE

## 2022-10-28 PROCEDURE — 1125F AMNT PAIN NOTED PAIN PRSNT: CPT | Mod: CPTII,S$GLB,, | Performed by: FAMILY MEDICINE

## 2022-10-28 PROCEDURE — 1101F PR PT FALLS ASSESS DOC 0-1 FALLS W/OUT INJ PAST YR: ICD-10-PCS | Mod: CPTII,S$GLB,, | Performed by: FAMILY MEDICINE

## 2022-10-28 PROCEDURE — 1125F PR PAIN SEVERITY QUANTIFIED, PAIN PRESENT: ICD-10-PCS | Mod: CPTII,S$GLB,, | Performed by: FAMILY MEDICINE

## 2022-10-28 PROCEDURE — 99999 PR PBB SHADOW E&M-EST. PATIENT-LVL III: CPT | Mod: PBBFAC,,, | Performed by: FAMILY MEDICINE

## 2022-10-28 PROCEDURE — 3288F FALL RISK ASSESSMENT DOCD: CPT | Mod: CPTII,S$GLB,, | Performed by: FAMILY MEDICINE

## 2022-10-28 PROCEDURE — 3078F DIAST BP <80 MM HG: CPT | Mod: CPTII,S$GLB,, | Performed by: FAMILY MEDICINE

## 2022-10-28 PROCEDURE — G0008 ADMIN INFLUENZA VIRUS VAC: HCPCS | Mod: S$GLB,,, | Performed by: FAMILY MEDICINE

## 2022-10-28 PROCEDURE — 90694 VACC AIIV4 NO PRSRV 0.5ML IM: CPT | Mod: S$GLB,,, | Performed by: FAMILY MEDICINE

## 2022-10-28 RX ORDER — CLOPIDOGREL BISULFATE 75 MG/1
75 TABLET ORAL DAILY
Qty: 90 TABLET | Refills: 3 | Status: SHIPPED | OUTPATIENT
Start: 2022-10-28 | End: 2023-04-28 | Stop reason: SDUPTHER

## 2022-10-28 RX ORDER — CHLORTHALIDONE 25 MG/1
TABLET ORAL
Qty: 45 TABLET | Refills: 1 | OUTPATIENT
Start: 2022-10-28

## 2022-10-28 RX ORDER — ATENOLOL 50 MG/1
50 TABLET ORAL DAILY
Qty: 90 TABLET | Refills: 3 | Status: SHIPPED | OUTPATIENT
Start: 2022-10-28 | End: 2023-04-28 | Stop reason: SDUPTHER

## 2022-10-28 RX ORDER — ALPRAZOLAM 0.5 MG/1
0.5 TABLET ORAL 2 TIMES DAILY PRN
Qty: 30 TABLET | Refills: 5 | Status: SHIPPED | OUTPATIENT
Start: 2022-10-28 | End: 2023-04-28 | Stop reason: SDUPTHER

## 2022-10-28 RX ORDER — CHLORTHALIDONE 25 MG/1
TABLET ORAL
Qty: 45 TABLET | Refills: 3 | Status: SHIPPED | OUTPATIENT
Start: 2022-10-28 | End: 2023-04-28 | Stop reason: SDUPTHER

## 2022-10-28 NOTE — TELEPHONE ENCOUNTER
No new care gaps identified.  [facility] Embedded Care Gaps. Reference number: [id number]. 10/28/2022   12:17:30 AM CDT

## 2022-10-28 NOTE — TELEPHONE ENCOUNTER
Refill Routing Note   Medication(s) are not appropriate for processing by [facility] for the following reason(s):      - Drug-Disease Interaction (CKD stage 3)    [facility] action(s):  Defer Medication-related problems identified: Drug-disease interaction     Medication Therapy Plan: CKD stage 3 reported 4/5/22  Medication reconciliation completed: No     Appointments  past 12m or future 3m with PCP    Date Provider   Last Visit   4/28/2022 Zion Yao MD   Next Visit   10/28/2022 Zion Yao MD   ED visits in past 90 days: 0        Note composed:9:37 AM 10/28/2022

## 2022-10-28 NOTE — PROGRESS NOTES
Subjective:       Patient ID: Evan Garcia is a 77 y.o. male.    Chief Complaint: Hypertension (6 month follow up) and Abdominal Pain    HPI Comments: Here today for f/u     C/o some intermittent LLQ pain at site of previous hernia.  No bulging or swelling  Bms normal    Liver cancer - following with oncology at . Will be getting labd and CT in December  HTN - tolerating amlodipine, atenolol, chlorthalidone. BPs at home within normal range.  BPH - tolerating Proscar daily; urine flow has been good  Tinnitus- using Xanax at bedtime to sleep. This is continuous.  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 De Guzman    Tinnitus of both ears     he consulted with ENT; He uses Xanax PRn    Wears glasses        Past Surgical History:   Procedure Laterality Date    CHOLECYSTECTOMY      CORONARY ANGIOGRAPHY  01/06/2020    Procedure: ANGIOGRAM, CORONARY ARTERY;  Surgeon: Uday Tripp MD;  Location: [facility];  Service: Cardiology;;    coronary artery stent  01/06/2020    HERNIA REPAIR      KNEE SURGERY      right; 3 separate surgeries to repair patellar fracture    partial liver resection  due to liver mass      stapendectomy Bilateral 1985    bilateraly    WISDOM TOOTH EXTRACTION         Allergies   Allergen Reactions    Sulfa (Sulfonamide Antibiotics) Hives and Other (See Comments)     Decreased white blood count  per pt       Social History     Socioeconomic History    Marital status:     Number of children: 3   Occupational History    Occupation: retired [employer]   Tobacco Use    Smoking status: Former     Packs/day: 0.50     Years: 20.00     Pack years: 10.00     Types: Cigars, Cigarettes     Start date: 4/24/2014    Smokeless tobacco: Never   Substance and Sexual Activity    Alcohol use: Not Currently    Drug use: No   Social History Narrative    Hobbies: fish, hunt        Exercise: [employer] 3 times a week with treadmill         Social Determinants of Health     Financial Resource Strain: Low Risk     Difficulty of Paying Living Expenses: Not very hard   Food Insecurity: No Food Insecurity    Worried About Running Out of Food in the Last Year: Never true    Ran Out of Food in the Last Year: Never true   Transportation Needs: No Transportation Needs    Lack of Transportation (Medical): No    Lack of Transportation (Non-Medical): No   Physical Activity: Insufficiently Active    Days of Exercise per Week: 7 days    Minutes of Exercise per Session: 20 min   Stress: No Stress Concern Present    Feeling of Stress : Not at all   Social Connections: Moderately Integrated    Frequency of Communication with Friends and Family: More than three times a week    Frequency of Social Gatherings with Friends and Family: More than three times a week    Attends Rastafari Services: More than 4 times per year    Active Member of Clubs or Organizations: Yes    Attends Club or Organization Meetings: More than 4 times per year    Marital Status:    Housing Stability: Unknown    Unable to Pay for Housing in the Last Year: No    Unstable Housing in the Last Year: No       Current Outpatient Medications on File Prior to Visit   Medication Sig Dispense Refill    amLODIPine (NORVASC) 10 MG tablet TAKE 1 TABLET BY MOUTH EVERY DAY 90 tablet 3    aspirin (ECOTRIN) 81 MG EC tablet Take 81 mg by mouth once daily.      finasteride (PROSCAR)  "5 mg tablet TAKE 1 TABLET BY MOUTH EVERY DAY 90 tablet 2    isosorbide mononitrate (IMDUR) 30 MG 24 hr tablet TAKE 1 TABLET BY MOUTH EVERY DAY (Patient taking differently: 30 mg. He is taking as needed) 90 tablet 3    KLOR-CON 10 10 mEq TbSR TAKE 2 TABLETS ONCE BY MOUTH FOR 1 DOSE       No current facility-administered medications on file prior to visit.       Family History   Problem Relation Age of Onset    Aneurysm Father     Cancer Mother         Lymphoma    Diabetes Mother     Melanoma Mother     Cancer Brother         salivary    Cancer Brother         Lymphoma     Review of Systems   Constitutional: Negative for fever, appetite change, fatigue and unexpected weight change.   HENT: Negative for ear pain, nosebleeds, congestion, sore throat, rhinorrhea, trouble swallowing, neck pain, postnasal drip, sinus pressure and ear discharge.  + dizziness  Respiratory: Negative for apnea, cough, chest tightness, shortness of breath and wheezing.    Cardiovascular: Negative for palpitations and leg swelling.   Gastrointestinal: Negative for nausea, vomiting, abdominal pain, diarrhea, constipation, blood in stool and abdominal distention.   Genitourinary: Negative for dysuria, urgency, frequency, hematuria, flank pain, scrotal swelling, difficulty urinating and testicular pain.   Skin: Negative for color change, rash and wound.   Neurological: Negative for dizziness, tremors, seizures, syncope, speech difficulty, weakness, light-headedness, numbness and headaches.   Hematological: Negative for adenopathy. Does not bruise/bleed easily.   Psychiatric/Behavioral: Negative for suicidal ideas, hallucinations, behavioral problems and confusion.       Objective:      /60   Pulse 63   Temp 97.9 °F (36.6 °C) (Oral)   Resp 18   Ht 5' 9" (1.753 m)   Wt 81.7 kg (180 lb 1.9 oz)   SpO2 95%   BMI 26.60 kg/m²   Physical Exam   Constitutional: He is oriented to person, place, and time. He appears well-developed and " well-nourished. He is active and cooperative.   Nose: Nose normal.   Eyelid hypertrophy bilaterally  Mouth/Throat: Oropharynx is clear and moist. No oropharyngeal exudate.   Eyes: Conjunctivae and EOM are normal. Pupils are equal, round, and reactive to light. Right eye exhibits no discharge. Left eye exhibits no discharge. No scleral icterus.   Neck: Trachea normal, normal range of motion and full passive range of motion without pain. Neck supple. Normal carotid pulses and no JVD present. Carotid bruit is not present. No tracheal deviation present. No mass and no thyromegaly present.   Cardiovascular: Normal rate, regular rhythm, S1 normal, S2 normal, normal heart sounds and intact distal pulses.  Exam reveals no gallop and no friction rub.  No murmur heard.  Pulses:       Carotid pulses are 2+ on the right side, and 2+ on the left side.       Radial pulses are 2+ on the right side, and 2+ on the left side.        Dorsalis pedis pulses are 2+ on the right side, and 2+ on the left side.   Pulmonary/Chest: Effort normal and breath sounds normal. No respiratory distress. He has no wheezes. He has no rales.   ABD: benign; no palpable masses  Lymphadenopathy:        Head (right side): No tonsillar adenopathy present.        Head (left side): No tonsillar adenopathy present.     He has no cervical adenopathy.   Neurological: He is alert and oriented to person, place, and time. He has normal strength. No cranial nerve deficit. Coordination normal.   Psychiatric: He has a normal mood and affect. His speech is normal and behavior is normal. Judgment and thought content normal.           Results for orders placed or performed in visit on 04/18/22   CBC Without Differential   Result Value Ref Range    WBC 7.54 3.90 - 12.70 K/uL    RBC 4.30 (L) 4.60 - 6.20 M/uL    Hemoglobin 12.0 (L) 14.0 - 18.0 g/dL    Hematocrit 38.0 (L) 40.0 - 54.0 %    MCV 88 82 - 98 fL    MCH 27.9 27.0 - 31.0 pg    MCHC 31.6 (L) 32.0 - 36.0 g/dL    RDW  13.9 11.5 - 14.5 %    Platelets 279 150 - 450 K/uL    MPV 10.8 9.2 - 12.9 fL   C-reactive protein   Result Value Ref Range    CRP 0.90 0.00 - 0.90 mg/dL   Comprehensive Metabolic Panel   Result Value Ref Range    Sodium 142 136 - 145 mmol/L    Potassium 3.7 3.5 - 5.1 mmol/L    Chloride 109 95 - 110 mmol/L    CO2 25 22 - 31 mmol/L    Glucose 106 70 - 110 mg/dL    BUN 16 9 - 21 mg/dL    Creatinine 1.15 0.50 - 1.40 mg/dL    Calcium 9.3 8.4 - 10.2 mg/dL    Total Protein 8.6 (H) 6.0 - 8.4 g/dL    Albumin 4.2 3.5 - 5.2 g/dL    Total Bilirubin 0.5 0.2 - 1.3 mg/dL    Alkaline Phosphatase 60 38 - 145 U/L    AST 55 17 - 59 U/L    ALT 29 0 - 50 U/L    Anion Gap 8 8 - 16 mmol/L    eGFR if African American >60 >60 mL/min/1.73 m^2    eGFR if non African American >60 >60 mL/min/1.73 m^2     Outside records reviewed    Assessment:       1. LLQ abdominal pain    2. Essential hypertension    3. Coronary artery disease involving native coronary artery of native heart without angina pectoris    4. Tinnitus of both ears          Plan:       LLQ abdominal pain    Essential hypertension  -     chlorthalidone (HYGROTEN) 25 MG Tab; One-half tablet po daily  Dispense: 45 tablet; Refill: 3  -     atenoloL (TENORMIN) 50 MG tablet; Take 1 tablet (50 mg total) by mouth once daily.  Dispense: 90 tablet; Refill: 3    Coronary artery disease involving native coronary artery of native heart without angina pectoris  -     clopidogreL (PLAVIX) 75 mg tablet; Take 1 tablet (75 mg total) by mouth once daily.  Dispense: 90 tablet; Refill: 3    Tinnitus of both ears  -     ALPRAZolam (XANAX) 0.5 MG tablet; Take 1 tablet (0.5 mg total) by mouth 2 (two) times daily as needed (tinitus).  Dispense: 30 tablet; Refill: 5    Other orders  -     Influenza - Quadrivalent (Adjuvanted)       Suspect likely adhesion as source of pain  Continue present meds  Counseled on regular exercise, maintenance of a healthy weight, balanced diet rich in fruits/vegetables and lean  protein, and avoidance of unhealthy habits like smoking and excessive alcohol intake.  Continue low fat diet  Xanax at bedtime for tinnitus  F/u 6 months to refill xanax or PRN

## 2023-02-01 ENCOUNTER — PES CALL (OUTPATIENT)
Dept: ADMINISTRATIVE | Facility: CLINIC | Age: 78
End: 2023-02-01
Payer: MEDICARE

## 2023-04-06 ENCOUNTER — TELEPHONE (OUTPATIENT)
Dept: FAMILY MEDICINE | Facility: CLINIC | Age: 78
End: 2023-04-06
Payer: MEDICARE

## 2023-04-06 NOTE — TELEPHONE ENCOUNTER
Spoke to Sofya Crump confirming that Medical Clearance has been faxed to [facility] Radiology Department. Sofya verbalized understanding.

## 2023-04-06 NOTE — TELEPHONE ENCOUNTER
----- Message from Diane Allen sent at 4/6/2023  2:59 PM CDT -----  .Type:  Patient Call Back    Who Called: MACHO FROM [facility]       Does the patient know what this is regarding?: PT NEEDS A SURGERY CLEARANCE 4/14 PT NEEDS TO BE TAKEN OFF THE PLAVIX STATING NEXT WEEK LAST DAY TO TAKE IT WILL BE  4/9/2023 PLEASE REACH OUT TO HER TODAY PLEASE     Would the patient rather a call back YES     Best Call Back Number: [phone]    Additional Information: Thank You

## 2023-04-28 ENCOUNTER — OFFICE VISIT (OUTPATIENT)
Dept: FAMILY MEDICINE | Facility: CLINIC | Age: 78
End: 2023-04-28
Payer: MEDICARE

## 2023-04-28 VITALS
WEIGHT: 184.06 LBS | TEMPERATURE: 98 F | HEART RATE: 73 BPM | HEIGHT: 69 IN | SYSTOLIC BLOOD PRESSURE: 134 MMHG | RESPIRATION RATE: 18 BRPM | BODY MASS INDEX: 27.26 KG/M2 | OXYGEN SATURATION: 95 % | DIASTOLIC BLOOD PRESSURE: 66 MMHG

## 2023-04-28 DIAGNOSIS — J47.9 BRONCHIECTASIS, UNCOMPLICATED: ICD-10-CM

## 2023-04-28 DIAGNOSIS — I10 ESSENTIAL HYPERTENSION: Primary | ICD-10-CM

## 2023-04-28 DIAGNOSIS — E43 UNSPECIFIED SEVERE PROTEIN-CALORIE MALNUTRITION: ICD-10-CM

## 2023-04-28 DIAGNOSIS — F13.20 SEDATIVE, HYPNOTIC OR ANXIOLYTIC DEPENDENCE, UNCOMPLICATED: ICD-10-CM

## 2023-04-28 DIAGNOSIS — I25.10 CORONARY ARTERY DISEASE INVOLVING NATIVE CORONARY ARTERY OF NATIVE HEART WITHOUT ANGINA PECTORIS: ICD-10-CM

## 2023-04-28 DIAGNOSIS — I25.118 CORONARY ARTERY DISEASE OF NATIVE ARTERY OF NATIVE HEART WITH STABLE ANGINA PECTORIS: ICD-10-CM

## 2023-04-28 DIAGNOSIS — N40.0 BENIGN NON-NODULAR PROSTATIC HYPERPLASIA WITHOUT LOWER URINARY TRACT SYMPTOMS: ICD-10-CM

## 2023-04-28 DIAGNOSIS — H93.13 TINNITUS OF BOTH EARS: ICD-10-CM

## 2023-04-28 DIAGNOSIS — I77.1 STRICTURE OF ARTERY: ICD-10-CM

## 2023-04-28 DIAGNOSIS — K74.60 HEPATIC CIRRHOSIS, UNSPECIFIED HEPATIC CIRRHOSIS TYPE, UNSPECIFIED WHETHER ASCITES PRESENT: ICD-10-CM

## 2023-04-28 DIAGNOSIS — N18.31 STAGE 3A CHRONIC KIDNEY DISEASE: ICD-10-CM

## 2023-04-28 PROCEDURE — 1101F PT FALLS ASSESS-DOCD LE1/YR: CPT | Mod: CPTII,S$GLB,, | Performed by: FAMILY MEDICINE

## 2023-04-28 PROCEDURE — 3288F FALL RISK ASSESSMENT DOCD: CPT | Mod: CPTII,S$GLB,, | Performed by: FAMILY MEDICINE

## 2023-04-28 PROCEDURE — 99214 PR OFFICE/OUTPT VISIT, EST, LEVL IV, 30-39 MIN: ICD-10-PCS | Mod: S$GLB,,, | Performed by: FAMILY MEDICINE

## 2023-04-28 PROCEDURE — 99499 UNLISTED E&M SERVICE: CPT | Mod: S$GLB,,, | Performed by: FAMILY MEDICINE

## 2023-04-28 PROCEDURE — 3078F PR MOST RECENT DIASTOLIC BLOOD PRESSURE < 80 MM HG: ICD-10-PCS | Mod: CPTII,S$GLB,, | Performed by: FAMILY MEDICINE

## 2023-04-28 PROCEDURE — 99999 PR PBB SHADOW E&M-EST. PATIENT-LVL III: ICD-10-PCS | Mod: PBBFAC,,, | Performed by: FAMILY MEDICINE

## 2023-04-28 PROCEDURE — 1160F PR REVIEW ALL MEDS BY PRESCRIBER/CLIN PHARMACIST DOCUMENTED: ICD-10-PCS | Mod: CPTII,S$GLB,, | Performed by: FAMILY MEDICINE

## 2023-04-28 PROCEDURE — 99499 RISK ADDL DX/OHS AUDIT: ICD-10-PCS | Mod: S$GLB,,, | Performed by: FAMILY MEDICINE

## 2023-04-28 PROCEDURE — 1159F MED LIST DOCD IN RCRD: CPT | Mod: CPTII,S$GLB,, | Performed by: FAMILY MEDICINE

## 2023-04-28 PROCEDURE — 1126F AMNT PAIN NOTED NONE PRSNT: CPT | Mod: CPTII,S$GLB,, | Performed by: FAMILY MEDICINE

## 2023-04-28 PROCEDURE — 1159F PR MEDICATION LIST DOCUMENTED IN MEDICAL RECORD: ICD-10-PCS | Mod: CPTII,S$GLB,, | Performed by: FAMILY MEDICINE

## 2023-04-28 PROCEDURE — 3078F DIAST BP <80 MM HG: CPT | Mod: CPTII,S$GLB,, | Performed by: FAMILY MEDICINE

## 2023-04-28 PROCEDURE — 3075F PR MOST RECENT SYSTOLIC BLOOD PRESS GE 130-139MM HG: ICD-10-PCS | Mod: CPTII,S$GLB,, | Performed by: FAMILY MEDICINE

## 2023-04-28 PROCEDURE — 1126F PR PAIN SEVERITY QUANTIFIED, NO PAIN PRESENT: ICD-10-PCS | Mod: CPTII,S$GLB,, | Performed by: FAMILY MEDICINE

## 2023-04-28 PROCEDURE — 99999 PR PBB SHADOW E&M-EST. PATIENT-LVL III: CPT | Mod: PBBFAC,,, | Performed by: FAMILY MEDICINE

## 2023-04-28 PROCEDURE — 1160F RVW MEDS BY RX/DR IN RCRD: CPT | Mod: CPTII,S$GLB,, | Performed by: FAMILY MEDICINE

## 2023-04-28 PROCEDURE — 3075F SYST BP GE 130 - 139MM HG: CPT | Mod: CPTII,S$GLB,, | Performed by: FAMILY MEDICINE

## 2023-04-28 PROCEDURE — 1101F PR PT FALLS ASSESS DOC 0-1 FALLS W/OUT INJ PAST YR: ICD-10-PCS | Mod: CPTII,S$GLB,, | Performed by: FAMILY MEDICINE

## 2023-04-28 PROCEDURE — 99214 OFFICE O/P EST MOD 30 MIN: CPT | Mod: S$GLB,,, | Performed by: FAMILY MEDICINE

## 2023-04-28 PROCEDURE — 3288F PR FALLS RISK ASSESSMENT DOCUMENTED: ICD-10-PCS | Mod: CPTII,S$GLB,, | Performed by: FAMILY MEDICINE

## 2023-04-28 RX ORDER — ALPRAZOLAM 0.5 MG/1
0.5 TABLET ORAL 2 TIMES DAILY PRN
Qty: 30 TABLET | Refills: 5 | Status: ON HOLD | OUTPATIENT
Start: 2023-04-28 | End: 2023-09-08

## 2023-04-28 RX ORDER — LIDOCAINE AND PRILOCAINE 25; 25 MG/G; MG/G
CREAM TOPICAL DAILY PRN
Status: ON HOLD | COMMUNITY
Start: 2023-04-17 | End: 2023-09-08

## 2023-04-28 RX ORDER — FINASTERIDE 5 MG/1
5 TABLET, FILM COATED ORAL DAILY
Qty: 90 TABLET | Refills: 3 | Status: SHIPPED | OUTPATIENT
Start: 2023-04-28 | End: 2023-08-04 | Stop reason: CLARIF

## 2023-04-28 RX ORDER — CLOPIDOGREL BISULFATE 75 MG/1
75 TABLET ORAL DAILY
Qty: 90 TABLET | Refills: 3 | Status: ON HOLD | OUTPATIENT
Start: 2023-04-28 | End: 2023-09-08

## 2023-04-28 RX ORDER — CHLORTHALIDONE 25 MG/1
TABLET ORAL
Qty: 45 TABLET | Refills: 3 | Status: ON HOLD | OUTPATIENT
Start: 2023-04-28 | End: 2023-08-08 | Stop reason: HOSPADM

## 2023-04-28 RX ORDER — ATENOLOL 50 MG/1
50 TABLET ORAL DAILY
Qty: 90 TABLET | Refills: 3 | Status: ON HOLD | OUTPATIENT
Start: 2023-04-28 | End: 2023-08-08 | Stop reason: HOSPADM

## 2023-04-28 NOTE — PROGRESS NOTES
Subjective:       Patient ID: Evan Garcia is a 77 y.o. male.    Chief Complaint: Hypertension (6 month follow up)      HPI Comments: Here today for 6 month f/u       Liver cancer - this has recorrued; following with oncology at [facility]. On chemo presently; using medical marijuana.  HTN - tolerating amlodipine, atenolol, chlorthalidone. BPs at home within normal range.  BPH - tolerating Proscar daily; urine flow has been good  Tinnitus- using Xanax at bedtime to sleep. This is continuous.  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 De Guzman    Tinnitus of both ears     he consulted with ENT; He uses Xanax PRn    Wears glasses        Past Surgical History:   Procedure Laterality Date    CHOLECYSTECTOMY      CORONARY ANGIOGRAPHY  01/06/2020    Procedure: ANGIOGRAM, CORONARY ARTERY;  Surgeon: Uday Tripp MD;  Location: [facility] CATH;  Service: Cardiology;;    coronary artery stent  01/06/2020    HERNIA REPAIR      KNEE SURGERY      right; 3 separate surgeries to repair patellar fracture    LIVER BIOPSY      NEEDLE LOCALIZATION N/A 4/14/2023    Procedure: NEEDLE LOCALIZATION - port placement in cath lab;  Surgeon: Nehemias Miles MD;  Location: [facility] CATH;  Service: Radiology;  Laterality: N/A;  port placement in cath lab    partial liver resection  due to liver mass  02/2021    stapendectomy Bilateral 1985     bilateraly    WISDOM TOOTH EXTRACTION         Allergies   Allergen Reactions    Sulfa (Sulfonamide Antibiotics) Hives and Other (See Comments)     Decreased white blood count per pt       Social History     Socioeconomic History    Marital status:     Number of children: 3   Occupational History    Occupation: retired ref[employer]   Tobacco Use    Smoking status: Former     Packs/day: 0.50     Years: 20.00     Pack years: 10.00     Types: Cigars, Cigarettes     Start date: 4/24/2014    Smokeless tobacco: Never   Substance and Sexual Activity    Alcohol use: Not Currently    Drug use: No   Social History Narrative    Hobbies: fish, hunt        Exercise: [employer] 3 times a week with treadmill         Social Determinants of Health     Financial Resource Strain: Low Risk     Difficulty of Paying Living Expenses: Not very hard   Food Insecurity: No Food Insecurity    Worried About Running Out of Food in the Last Year: Never true    Ran Out of Food in the Last Year: Never true   Transportation Needs: No Transportation Needs    Lack of Transportation (Medical): No    Lack of Transportation (Non-Medical): No   Physical Activity: Insufficiently Active    Days of Exercise per Week: 7 days    Minutes of Exercise per Session: 20 min   Stress: No Stress Concern Present    Feeling of Stress : Not at all   Social Connections: Moderately Integrated    Frequency of Communication with Friends and Family: More than three times a week    Frequency of Social Gatherings with Friends and Family: More than three times a week    Attends Mormon Services: More than 4 times per year    Active Member of Clubs or Organizations: Yes    Attends Club or Organization Meetings: More than 4 times per year    Marital Status:    Housing Stability: Unknown    Unable to Pay for Housing in the Last Year: No    Unstable Housing in the Last Year: No       Current Outpatient Medications on File Prior to Visit   Medication Sig Dispense Refill     "amLODIPine (NORVASC) 10 MG tablet TAKE 1 TABLET BY MOUTH EVERY DAY 90 tablet 3    aspirin (ECOTRIN) 81 MG EC tablet Take 81 mg by mouth once daily.      isosorbide mononitrate (IMDUR) 30 MG 24 hr tablet TAKE 1 TABLET BY MOUTH EVERY DAY 90 tablet 2    LIDOcaine-prilocaine (EMLA) cream Apply topically.      KLOR-CON 10 10 mEq TbSR TAKE 2 TABLETS ONCE BY MOUTH FOR 1 DOSE       No current facility-administered medications on file prior to visit.       Family History   Problem Relation Age of Onset    Aneurysm Father     Cancer Mother         Lymphoma    Diabetes Mother     Melanoma Mother     Cancer Brother         salivary    Cancer Brother         Lymphoma     Review of Systems   Constitutional: Negative for fever, appetite change, fatigue and unexpected weight change.   HENT: Negative for ear pain, nosebleeds, congestion, sore throat, rhinorrhea, trouble swallowing, neck pain, postnasal drip, sinus pressure and ear discharge.  + dizziness  Respiratory: Negative for apnea, cough, chest tightness, shortness of breath and wheezing.    Cardiovascular: Negative for palpitations and leg swelling.   Gastrointestinal: Negative for nausea, vomiting, abdominal pain, diarrhea, constipation, blood in stool and abdominal distention.   Genitourinary: Negative for dysuria, urgency, frequency, hematuria, flank pain, scrotal swelling, difficulty urinating and testicular pain.   Skin: Negative for color change, rash and wound.   Neurological: Negative for dizziness, tremors, seizures, syncope, speech difficulty, weakness, light-headedness, numbness and headaches.   Hematological: Negative for adenopathy. Does not bruise/bleed easily.   Psychiatric/Behavioral: Negative for suicidal ideas, hallucinations, behavioral problems and confusion.       Objective:      /66   Pulse 73   Temp 97.7 °F (36.5 °C) (Oral)   Resp 18   Ht 5' 9" (1.753 m)   Wt 83.5 kg (184 lb 1.4 oz)   SpO2 95%   BMI 27.18 kg/m²   Physical Exam "   Constitutional: He is oriented to person, place, and time. He appears well-developed and well-nourished. He is active and cooperative.   Nose: Nose normal.   Eyelid hypertrophy bilaterally  Mouth/Throat: Oropharynx is clear and moist. No oropharyngeal exudate.   Eyes: Conjunctivae and EOM are normal. Pupils are equal, round, and reactive to light. Right eye exhibits no discharge. Left eye exhibits no discharge. No scleral icterus.   Neck: Trachea normal, normal range of motion and full passive range of motion without pain. Neck supple. Normal carotid pulses and no JVD present. Carotid bruit is not present. No tracheal deviation present. No mass and no thyromegaly present.   Cardiovascular: Normal rate, regular rhythm, S1 normal, S2 normal, normal heart sounds and intact distal pulses.  Exam reveals no gallop and no friction rub.  No murmur heard.  Pulses:       Carotid pulses are 2+ on the right side, and 2+ on the left side.       Radial pulses are 2+ on the right side, and 2+ on the left side.        Dorsalis pedis pulses are 2+ on the right side, and 2+ on the left side.   Pulmonary/Chest: Effort normal and breath sounds normal. No respiratory distress. He has no wheezes. He has no rales.   ABD: benign; no palpable masses  Lymphadenopathy:        Head (right side): No tonsillar adenopathy present.        Head (left side): No tonsillar adenopathy present.     He has no cervical adenopathy.   Neurological: He is alert and oriented to person, place, and time. He has normal strength. No cranial nerve deficit. Coordination normal.   Psychiatric: He has a normal mood and affect. His speech is normal and behavior is normal. Judgment and thought content normal.           Results for orders placed or performed during the hospital encounter of 04/14/23   Protime-INR   Result Value Ref Range    PT 14.4 11.8 - 14.7 sec    INR 1.1    Basic metabolic panel   Result Value Ref Range    Sodium 142 136 - 145 mmol/L    Potassium  3.6 3.5 - 5.1 mmol/L    Chloride 104 95 - 110 mmol/L    CO2 29 22 - 31 mmol/L    Glucose 138 (H) 70 - 110 mg/dL    BUN 16 9 - 21 mg/dL    Creatinine 1.12 0.50 - 1.40 mg/dL    Calcium 9.1 8.4 - 10.2 mg/dL    Anion Gap 9 8 - 16 mmol/L    eGFR >60 >60 mL/min/1.73 m^2       Assessment:       1. Essential hypertension    2. Benign non-nodular prostatic hyperplasia without lower urinary tract symptoms    3. Coronary artery disease involving native coronary artery of native heart without angina pectoris    4. Tinnitus of both ears    5. Unspecified severe protein-calorie malnutrition    6. Hepatic cirrhosis, unspecified hepatic cirrhosis type, unspecified whether ascites present    7. Sedative, hypnotic or anxiolytic dependence, uncomplicated    8. Stricture of artery    9. Bronchiectasis, uncomplicated    10. Coronary artery disease of native artery of native heart with stable angina pectoris    11. Stage 3a chronic kidney disease            Plan:       Essential hypertension  -     atenoloL (TENORMIN) 50 MG tablet; Take 1 tablet (50 mg total) by mouth once daily.  Dispense: 90 tablet; Refill: 3  -     chlorthalidone (HYGROTEN) 25 MG Tab; One-half tablet po daily  Dispense: 45 tablet; Refill: 3    Benign non-nodular prostatic hyperplasia without lower urinary tract symptoms  -     finasteride (PROSCAR) 5 mg tablet; Take 1 tablet (5 mg total) by mouth once daily.  Dispense: 90 tablet; Refill: 3    Coronary artery disease involving native coronary artery of native heart without angina pectoris  -     clopidogreL (PLAVIX) 75 mg tablet; Take 1 tablet (75 mg total) by mouth once daily.  Dispense: 90 tablet; Refill: 3    Tinnitus of both ears  -     ALPRAZolam (XANAX) 0.5 MG tablet; Take 1 tablet (0.5 mg total) by mouth 2 (two) times daily as needed (tinitus).  Dispense: 30 tablet; Refill: 5    Unspecified severe protein-calorie malnutrition    Hepatic cirrhosis, unspecified hepatic cirrhosis type, unspecified whether ascites  present    Sedative, hypnotic or anxiolytic dependence, uncomplicated    Stricture of artery    Bronchiectasis, uncomplicated    Coronary artery disease of native artery of native heart with stable angina pectoris    Stage 3a chronic kidney disease         Reassurance  F/u with oncology as planned  Continue present meds  Counseled on regular exercise, maintenance of a healthy weight, balanced diet rich in fruits/vegetables and lean protein, and avoidance of unhealthy habits like smoking and excessive alcohol intake.  Continue low fat diet  Xanax at bedtime for tinnitus  F/u 6 months to refill xanax or PRN

## 2023-04-29 PROBLEM — F13.20 SEDATIVE, HYPNOTIC OR ANXIOLYTIC DEPENDENCE, UNCOMPLICATED: Status: ACTIVE | Noted: 2023-04-29

## 2023-04-29 PROBLEM — E43 UNSPECIFIED SEVERE PROTEIN-CALORIE MALNUTRITION: Status: ACTIVE | Noted: 2023-04-29

## 2023-04-29 PROBLEM — I77.1 STRICTURE OF ARTERY: Status: ACTIVE | Noted: 2023-04-29

## 2023-04-29 PROBLEM — J47.9 BRONCHIECTASIS, UNCOMPLICATED: Status: ACTIVE | Noted: 2023-04-29

## 2023-04-29 PROBLEM — K74.60 HEPATIC CIRRHOSIS, UNSPECIFIED HEPATIC CIRRHOSIS TYPE, UNSPECIFIED WHETHER ASCITES PRESENT: Status: ACTIVE | Noted: 2023-04-29

## 2023-05-02 ENCOUNTER — TELEPHONE (OUTPATIENT)
Dept: FAMILY MEDICINE | Facility: CLINIC | Age: 78
End: 2023-05-02
Payer: MEDICARE

## 2023-05-19 NOTE — TELEPHONE ENCOUNTER
----- Message from Vandana Thacker MA sent at 11/5/2020  1:19 PM CST -----  Regarding: FW: advice  Contact: Kita with [facility]  Has pravastatin been cancelled?  ----- Message -----  From: Farheen Guerrero  Sent: 11/5/2020  12:11 PM CST  To: Corazon MIGUEL Jr Staff  Subject: advice                                           Type: Needs Medical Advice  Who Called:  Kita with [email or web address]  Symptoms (please be specific):   How long has patient had these symptoms:    Pharmacy name and phone #:    Best Call Back Number: [phone]  Additional Information: Kita needs to know if the Pravastatin has been discontinued for the patient. Please call Kita. Thanks!         no

## 2023-06-13 ENCOUNTER — PES CALL (OUTPATIENT)
Dept: ADMINISTRATIVE | Facility: CLINIC | Age: 78
End: 2023-06-13
Payer: MEDICARE

## 2023-08-04 PROBLEM — D72.829 LEUKOCYTOSIS: Status: ACTIVE | Noted: 2023-08-04

## 2023-08-04 PROBLEM — E87.20 LACTIC ACIDOSIS: Status: ACTIVE | Noted: 2023-08-04
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
## 2023-08-04 PROBLEM — E87.20 LACTIC ACIDOSIS: Status: RESOLVED | Noted: 2023-08-04 | Resolved: 2023-08-04

## 2023-08-09 PROCEDURE — G0180 PR HOME HEALTH MD CERTIFICATION: ICD-10-PCS | Mod: ,,, | Performed by: FAMILY MEDICINE

## 2023-08-09 PROCEDURE — G0180 MD CERTIFICATION HHA PATIENT: HCPCS | Mod: ,,, | Performed by: FAMILY MEDICINE

## 2023-08-17 ENCOUNTER — EXTERNAL HOME HEALTH (OUTPATIENT)
Dept: HOME HEALTH SERVICES | Facility: HOSPITAL | Age: 78
End: 2023-08-17
Payer: MEDICARE

## 2023-08-18 ENCOUNTER — OFFICE VISIT (OUTPATIENT)
Dept: FAMILY MEDICINE | Facility: CLINIC | Age: 78
End: 2023-08-18
Payer: MEDICARE

## 2023-08-18 VITALS
BODY MASS INDEX: 25.73 KG/M2 | HEIGHT: 69 IN | TEMPERATURE: 98 F | SYSTOLIC BLOOD PRESSURE: 116 MMHG | WEIGHT: 173.75 LBS | HEART RATE: 88 BPM | DIASTOLIC BLOOD PRESSURE: 56 MMHG | RESPIRATION RATE: 18 BRPM | OXYGEN SATURATION: 95 %

## 2023-08-18 DIAGNOSIS — C22.1 INTRAHEPATIC CHOLANGIOCARCINOMA: ICD-10-CM

## 2023-08-18 DIAGNOSIS — N45.1 EPIDIDYMITIS: Primary | ICD-10-CM

## 2023-08-18 DIAGNOSIS — I25.10 CORONARY ARTERY DISEASE INVOLVING NATIVE CORONARY ARTERY OF NATIVE HEART WITHOUT ANGINA PECTORIS: ICD-10-CM

## 2023-08-18 DIAGNOSIS — I10 ESSENTIAL HYPERTENSION: ICD-10-CM

## 2023-08-18 PROCEDURE — 99214 PR OFFICE/OUTPT VISIT, EST, LEVL IV, 30-39 MIN: ICD-10-PCS | Mod: S$GLB,,, | Performed by: FAMILY MEDICINE

## 2023-08-18 PROCEDURE — 3288F FALL RISK ASSESSMENT DOCD: CPT | Mod: CPTII,S$GLB,, | Performed by: FAMILY MEDICINE

## 2023-08-18 PROCEDURE — 1125F AMNT PAIN NOTED PAIN PRSNT: CPT | Mod: CPTII,S$GLB,, | Performed by: FAMILY MEDICINE

## 2023-08-18 PROCEDURE — 99999 PR PBB SHADOW E&M-EST. PATIENT-LVL III: ICD-10-PCS | Mod: PBBFAC,,, | Performed by: FAMILY MEDICINE

## 2023-08-18 PROCEDURE — 3074F PR MOST RECENT SYSTOLIC BLOOD PRESSURE < 130 MM HG: ICD-10-PCS | Mod: CPTII,S$GLB,, | Performed by: FAMILY MEDICINE

## 2023-08-18 PROCEDURE — 1111F PR DISCHARGE MEDS RECONCILED W/ CURRENT OUTPATIENT MED LIST: ICD-10-PCS | Mod: CPTII,S$GLB,, | Performed by: FAMILY MEDICINE

## 2023-08-18 PROCEDURE — 99999 PR PBB SHADOW E&M-EST. PATIENT-LVL III: CPT | Mod: PBBFAC,,, | Performed by: FAMILY MEDICINE

## 2023-08-18 PROCEDURE — 99214 OFFICE O/P EST MOD 30 MIN: CPT | Mod: S$GLB,,, | Performed by: FAMILY MEDICINE

## 2023-08-18 PROCEDURE — 3078F PR MOST RECENT DIASTOLIC BLOOD PRESSURE < 80 MM HG: ICD-10-PCS | Mod: CPTII,S$GLB,, | Performed by: FAMILY MEDICINE

## 2023-08-18 PROCEDURE — 1101F PR PT FALLS ASSESS DOC 0-1 FALLS W/OUT INJ PAST YR: ICD-10-PCS | Mod: CPTII,S$GLB,, | Performed by: FAMILY MEDICINE

## 2023-08-18 PROCEDURE — 3074F SYST BP LT 130 MM HG: CPT | Mod: CPTII,S$GLB,, | Performed by: FAMILY MEDICINE

## 2023-08-18 PROCEDURE — 1111F DSCHRG MED/CURRENT MED MERGE: CPT | Mod: CPTII,S$GLB,, | Performed by: FAMILY MEDICINE

## 2023-08-18 PROCEDURE — 3288F PR FALLS RISK ASSESSMENT DOCUMENTED: ICD-10-PCS | Mod: CPTII,S$GLB,, | Performed by: FAMILY MEDICINE

## 2023-08-18 PROCEDURE — 3078F DIAST BP <80 MM HG: CPT | Mod: CPTII,S$GLB,, | Performed by: FAMILY MEDICINE

## 2023-08-18 PROCEDURE — 1125F PR PAIN SEVERITY QUANTIFIED, PAIN PRESENT: ICD-10-PCS | Mod: CPTII,S$GLB,, | Performed by: FAMILY MEDICINE

## 2023-08-18 PROCEDURE — 1159F MED LIST DOCD IN RCRD: CPT | Mod: CPTII,S$GLB,, | Performed by: FAMILY MEDICINE

## 2023-08-18 PROCEDURE — 1101F PT FALLS ASSESS-DOCD LE1/YR: CPT | Mod: CPTII,S$GLB,, | Performed by: FAMILY MEDICINE

## 2023-08-18 PROCEDURE — 1159F PR MEDICATION LIST DOCUMENTED IN MEDICAL RECORD: ICD-10-PCS | Mod: CPTII,S$GLB,, | Performed by: FAMILY MEDICINE

## 2023-08-18 RX ORDER — NAPROXEN 500 MG/1
500 TABLET ORAL 2 TIMES DAILY PRN
Qty: 60 TABLET | Refills: 2 | Status: ON HOLD | OUTPATIENT
Start: 2023-08-18 | End: 2023-09-08

## 2023-08-18 RX ORDER — CIPROFLOXACIN 500 MG/1
500 TABLET ORAL 2 TIMES DAILY
Qty: 20 TABLET | Refills: 0 | Status: SHIPPED | OUTPATIENT
Start: 2023-08-18 | End: 2023-08-28

## 2023-08-18 RX ORDER — ISOSORBIDE MONONITRATE 60 MG/1
60 TABLET, EXTENDED RELEASE ORAL DAILY
Status: ON HOLD | COMMUNITY
Start: 2023-08-17 | End: 2023-09-08

## 2023-08-18 NOTE — PROGRESS NOTES
Subjective:       Patient ID: Evan Garcia is a 77 y.o. male.    Chief Complaint: Hospital Follow up [facility] (Dehydration)      HPI Comments: Here today for ER followup.    He was seen at [facility] on 8/4  He was treated for dehydration.  In the hospital he developed some left scrotal swelling and pain after he rolled over on his testicle in the hospital.    Next oncology appt is Monday.  He is seriously contemplating not doing any chemo due to lack of increase life expectancy.    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 Coleman,  at [facility]. using medical marijuana. He has not tolerated chemotherapy.  HTN - tolerating amlodipine, atenolol, chlorthalidone. BPs at home within normal range.  BPH - tolerating Proscar daily; urine flow has been good  Tinnitus- using Xanax at bedtime to sleep. This is continuous.  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 De Guzman    Tinnitus of both ears     he consulted with ENT; He uses Xanax PRn    Wears glasses        Past Surgical History:   Procedure Laterality Date    CHOLECYSTECTOMY      CORONARY ANGIOGRAPHY  01/06/2020    Procedure: ANGIOGRAM, CORONARY ARTERY;  Surgeon: Uday Tripp MD;  Location: [facility] CATH;  Service: Cardiology;;    coronary artery stent  01/06/2020    HERNIA REPAIR      KNEE SURGERY      right; 3 separate surgeries to  repair patellar fracture    LIVER BIOPSY      NEEDLE LOCALIZATION N/A 4/14/2023    Procedure: NEEDLE LOCALIZATION - port placement in cath lab;  Surgeon: Nehemias Miles MD;  Location: [facility] CATH;  Service: Radiology;  Laterality: N/A;  port placement in cath lab    partial liver resection  due to liver mass  02/2021    stapendectomy Bilateral 1985    bilateraly    WISDOM TOOTH EXTRACTION         Allergies   Allergen Reactions    Sulfa (Sulfonamide Antibiotics) Hives and Other (See Comments)     Decreased white blood count per pt       Social History     Socioeconomic History    Marital status:     Number of children: 3   Occupational History    Occupation: retired [employer]   Tobacco Use    Smoking status: Former     Current packs/day: 0.50     Average packs/day: 0.5 packs/day for 20.0 years (10.0 ttl pk-yrs)     Types: Cigars, Cigarettes     Start date: 4/24/2014    Smokeless tobacco: Never   Substance and Sexual Activity    Alcohol use: Not Currently    Drug use: No   Social History Narrative    Hobbies: fish, hunt        Exercise: [employer] 3 times a week with treadmill         Social Determinants of Health     Financial Resource Strain: Low Risk  (7/11/2022)    Overall Financial Resource Strain (CARDIA)     Difficulty of Paying Living Expenses: Not very hard   Food Insecurity: No Food Insecurity (7/11/2022)    Hunger Vital Sign     Worried About Running Out of Food in the Last Year: Never true     Ran Out of Food in the Last Year: Never true   Transportation Needs: No Transportation Needs (7/11/2022)    PRAPARE - Transportation     Lack of Transportation (Medical): No     Lack of Transportation (Non-Medical): No   Physical Activity: Insufficiently Active (7/11/2022)    Exercise Vital Sign     Days of Exercise per Week: 7 days     Minutes of Exercise per Session: 20 min   Stress: No Stress Concern Present (7/11/2022)    [place] [employer] - Occupational Stress Questionnaire      Feeling of Stress : Not at all   Social Connections: Moderately Integrated (7/11/2022)    Social Connection and Isolation Panel [NHANES]     Frequency of Communication with Friends and Family: More than three times a week     Frequency of Social Gatherings with Friends and Family: More than three times a week     Attends Druze Services: More than 4 times per year     Active Member of Clubs or Organizations: Yes     Attends Club or Organization Meetings: More than 4 times per year     Marital Status:    Housing Stability: Unknown (7/11/2022)    Housing Stability Vital Sign     Unable to Pay for Housing in the Last Year: No     Unstable Housing in the Last Year: No       Current Outpatient Medications on File Prior to Visit   Medication Sig Dispense Refill    ALPRAZolam (XANAX) 0.5 MG tablet Take 1 tablet (0.5 mg total) by mouth 2 (two) times daily as needed (tinitus). 30 tablet 5    amLODIPine (NORVASC) 10 MG tablet TAKE 1 TABLET BY MOUTH EVERY DAY (Patient taking differently: Take 10 mg by mouth once daily.) 90 tablet 3    aspirin (ECOTRIN) 81 MG EC tablet Take 81 mg by mouth once daily.      clopidogreL (PLAVIX) 75 mg tablet Take 1 tablet (75 mg total) by mouth once daily. 90 tablet 3    HYDROcodone-acetaminophen (NORCO)  mg per tablet Take 1 tablet by mouth every 4 (four) hours as needed for Pain. 30 tablet 0    isosorbide mononitrate (IMDUR) 60 MG 24 hr tablet Take 60 mg by mouth.      LIDOcaine-prilocaine (EMLA) cream Apply topically daily as needed (port access).      metoprolol succinate (TOPROL-XL) 25 MG 24 hr tablet Take 1 tablet (25 mg total) by mouth once daily. 30 tablet 2    ondansetron (ZOFRAN-ODT) 8 MG TbDL Take 8 mg by mouth every 8 (eight) hours as needed.      prochlorperazine (COMPAZINE) 10 MG tablet Take 10 mg by mouth 3 (three) times daily as needed (nausea).      megestroL (MEGACE) 400 mg/10 mL (40 mg/mL) Susp Take 5 mLs (200 mg total) by mouth once daily. 150 mL 2     No  "current facility-administered medications on file prior to visit.       Family History   Problem Relation Age of Onset    Aneurysm Father     Cancer Mother         Lymphoma    Diabetes Mother     Melanoma Mother     Cancer Brother         salivary    Cancer Brother         Lymphoma     Review of Systems   Constitutional: Negative for fever, appetite change, fatigue and unexpected weight change.   HENT: Negative for ear pain, nosebleeds, congestion, sore throat, rhinorrhea, trouble swallowing, neck pain, postnasal drip, sinus pressure and ear discharge.  + dizziness  Respiratory: Negative for apnea, cough, chest tightness, shortness of breath and wheezing.    Cardiovascular: Negative for palpitations and leg swelling.   Gastrointestinal: Negative for nausea, vomiting, abdominal pain, diarrhea, constipation, blood in stool and abdominal distention.   Genitourinary: Negative for dysuria, urgency, frequency, hematuria, flank pain, scrotal swelling, difficulty urinating and testicular pain.   Skin: Negative for color change, rash and wound.   Neurological: Negative for dizziness, tremors, seizures, syncope, speech difficulty, weakness, light-headedness, numbness and headaches.   Hematological: Negative for adenopathy. Does not bruise/bleed easily.   Psychiatric/Behavioral: Negative for suicidal ideas, hallucinations, behavioral problems and confusion.       Objective:      BP (!) 116/56   Pulse 88   Temp 98.1 °F (36.7 °C) (Oral)   Resp 18   Ht 5' 9" (1.753 m)   Wt 78.8 kg (173 lb 11.6 oz)   SpO2 95%   BMI 25.65 kg/m²   Physical Exam   Constitutional: He is oriented to person, place, and time. He appears well-developed and well-nourished. He is active and cooperative.   Nose: Nose normal.   Eyelid hypertrophy bilaterally  Mouth/Throat: Oropharynx is clear and moist. No oropharyngeal exudate.   Eyes: Conjunctivae and EOM are normal. Pupils are equal, round, and reactive to light. Right eye exhibits no discharge. " Left eye exhibits no discharge. No scleral icterus.   Neck: Trachea normal, normal range of motion and full passive range of motion without pain. Neck supple. Normal carotid pulses and no JVD present. Carotid bruit is not present. No tracheal deviation present. No mass and no thyromegaly present.   Cardiovascular: Normal rate, regular rhythm, S1 normal, S2 normal, normal heart sounds and intact distal pulses.  Exam reveals no gallop and no friction rub.  No murmur heard.  Pulses:       Carotid pulses are 2+ on the right side, and 2+ on the left side.       Radial pulses are 2+ on the right side, and 2+ on the left side.        Dorsalis pedis pulses are 2+ on the right side, and 2+ on the left side.   Pulmonary/Chest: Effort normal and breath sounds normal. No respiratory distress. He has no wheezes. He has no rales.   ABD: benign; no palpable masses  Lymphadenopathy:        Head (right side): No tonsillar adenopathy present.        Head (left side): No tonsillar adenopathy present.     He has no cervical adenopathy.   Neurological: He is alert and oriented to person, place, and time. He has normal strength. No cranial nerve deficit. Coordination normal.   Psychiatric: He has a normal mood and affect. His speech is normal and behavior is normal. Judgment and thought content normal.   Bilateral testicles normal. Some tenderness and fullness in left epididimis        Results for orders placed or performed during the hospital encounter of 08/04/23   Blood culture #1 **CANNOT BE ORDERED STAT**    Specimen: Blood   Result Value Ref Range    Blood Culture, Routine No growth after 5 days.    Blood culture #2 **CANNOT BE ORDERED STAT**    Specimen: Blood   Result Value Ref Range    Blood Culture, Routine No growth after 5 days.    C Diff Toxin by PCR    Specimen: Stool   Result Value Ref Range    C. diff PCR Negative Negative   CBC auto differential   Result Value Ref Range    WBC 37.98 (H) 3.90 - 12.70 K/uL    RBC 4.52 (L)  4.60 - 6.20 M/uL    Hemoglobin 13.1 (L) 14.0 - 18.0 g/dL    Hematocrit 38.8 (L) 40.0 - 54.0 %    MCV 86 82 - 98 fL    MCH 29.0 27.0 - 31.0 pg    MCHC 33.8 32.0 - 36.0 g/dL    RDW 15.6 (H) 11.5 - 14.5 %    Platelets 166 150 - 450 K/uL    MPV 10.6 9.2 - 12.9 fL    Immature Granulocytes CANCELED 0.0 - 0.5 %    Gran # (ANC) 35.7 (H) 1.8 - 7.7 K/uL    Immature Grans (Abs) CANCELED 0.00 - 0.04 K/uL    nRBC 0 0 /100 WBC    Gran % 86.0 (H) 38.0 - 73.0 %    Lymph % 3.0 (L) 18.0 - 48.0 %    Mono % 2.0 (L) 4.0 - 15.0 %    Eosinophil % 1.0 0.0 - 8.0 %    Basophil % 0.0 0.0 - 1.9 %    Bands 8.0 %    Platelet Estimate Appears normal     Aniso Slight     Differential Method Manual    Comprehensive metabolic panel (CMP)   Result Value Ref Range    Sodium 139 136 - 145 mmol/L    Potassium 3.4 (L) 3.5 - 5.1 mmol/L    Chloride 100 95 - 110 mmol/L    CO2 27 22 - 31 mmol/L    Glucose 126 (H) 70 - 110 mg/dL    BUN 28 (H) 9 - 21 mg/dL    Creatinine 1.26 0.50 - 1.40 mg/dL    Calcium 9.0 8.4 - 10.2 mg/dL    Total Protein 7.4 6.0 - 8.4 g/dL    Albumin 4.1 3.5 - 5.2 g/dL    Total Bilirubin 2.1 (H) 0.2 - 1.3 mg/dL    Alkaline Phosphatase 78 38 - 145 U/L    AST 46 17 - 59 U/L    ALT 42 0 - 50 U/L    Anion Gap 12 5 - 12 mmol/L    eGFR 59 (A) >60 mL/min/1.73 m^2   Troponin   Result Value Ref Range    Troponin I 0.018 0.012 - 0.034 ng/mL   D dimer, quantitative   Result Value Ref Range    D-Dimer 4.55 (H) <0.50 ug/mL FEU   Troponin in 2 Hours   Result Value Ref Range    Troponin I 0.018 0.012 - 0.034 ng/mL   Lactic acid, plasma   Result Value Ref Range    Lactate (Lactic Acid) 2.6 (H) 0.5 - 2.2 mmol/L   Urinalysis, Reflex to Urine Culture Urine, Clean Catch    Specimen: Urine   Result Value Ref Range    Specimen UA Urine, Clean Catch     Color, UA Yellow Yellow, Straw, Xiomara    Appearance, UA Clear Clear    pH, UA 6.5 5.0 - 8.0    Specific Gravity, UA >=1.030 (A) 1.005 - 1.030    Protein, UA Trace (A) Negative    Glucose, UA Negative Negative     Ketones, UA Trace (A) Negative    Bilirubin (UA) Negative Negative    Occult Blood UA Negative Negative    Nitrite, UA Negative Negative    Urobilinogen, UA 1.0 <2.0 EU/dL    Leukocytes, UA Negative Negative   Urinalysis, Reflex to Urine Culture Urine, Clean Catch    Specimen: Urine   Result Value Ref Range    Specimen UA Urine, Clean Catch     Color, UA Yellow Yellow, Straw, Xiomara    Appearance, UA Clear Clear    pH, UA 6.5 5.0 - 8.0    Specific Gravity, UA >=1.030 (A) 1.005 - 1.030    Protein, UA Trace (A) Negative    Glucose, UA Negative Negative    Ketones, UA Trace (A) Negative    Bilirubin (UA) Negative Negative    Occult Blood UA Negative Negative    Nitrite, UA Negative Negative    Urobilinogen, UA 1.0 <2.0 EU/dL    Leukocytes, UA Negative Negative   APTT   Result Value Ref Range    aPTT 32.8 24.6 - 36.7 sec   Protime-INR   Result Value Ref Range    PT 15.2 (H) 11.8 - 14.7 sec    INR 1.2    Lactic acid, plasma in 4 Hours   Result Value Ref Range    Lactate (Lactic Acid) 1.8 0.5 - 2.2 mmol/L   Bahena's Stain, Urine Random   Result Value Ref Range    Bahena's Stain, Ur No eosinophils seen No eosinophils seen   Sodium, Random Urine   Result Value Ref Range    Sodium, Urine 145 20 - 250 mmol/L   Protein/Creatinine Ratio, Urine   Result Value Ref Range    Protein, Urine Random 17 (H) 0 - 11 mg/dL    Creatinine, Urine 64.6 23.0 - 375.0 mg/dL    Prot/Creat Ratio, Urine 263.2 (H) 15.0 - 68.0 mg/g   Potassium, Random Urine   Result Value Ref Range    Potassium, Urine 25 15 - 95 mmol/L   Osmolality, Urine   Result Value Ref Range    Osmolality, Urine 540 50 - 1200 mOsm/kg   Creatinine, Random Urine   Result Value Ref Range    Creatinine, Urine 64.6 23.0 - 375.0 mg/dL   Microalbumin/Creatinine Ratio, Urine   Result Value Ref Range    Microalbumin, Urine 15.4 ug/mL    Creatinine, Urine 64.6 23.0 - 375.0 mg/dL    Microalb/Creat Ratio 23.8 0.0 - 30.0 ug/mg   Phosphorus   Result Value Ref Range    Phosphorus 2.5 (L) 2.7 -  4.5 mg/dL   Magnesium   Result Value Ref Range    Magnesium 1.8 1.6 - 2.6 mg/dL   Osmolality, Serum   Result Value Ref Range    Osmolality 306 (H) 280 - 300 mOsm/kg   Renal Function Panel   Result Value Ref Range    Glucose 74 70 - 110 mg/dL    Sodium 139 136 - 145 mmol/L    Potassium 3.0 (L) 3.5 - 5.1 mmol/L    Chloride 104 95 - 110 mmol/L    CO2 27 22 - 31 mmol/L    BUN 20 9 - 21 mg/dL    Calcium 8.2 (L) 8.4 - 10.2 mg/dL    Creatinine 1.02 0.50 - 1.40 mg/dL    Albumin 3.4 (L) 3.5 - 5.2 g/dL    Phosphorus 3.8 2.7 - 4.5 mg/dL    eGFR >60 >60 mL/min/1.73 m^2    Anion Gap 8 5 - 12 mmol/L   CBC auto differential   Result Value Ref Range    WBC 15.74 (H) 3.90 - 12.70 K/uL    RBC 3.94 (L) 4.60 - 6.20 M/uL    Hemoglobin 11.2 (L) 14.0 - 18.0 g/dL    Hematocrit 35.1 (L) 40.0 - 54.0 %    MCV 89 82 - 98 fL    MCH 28.4 27.0 - 31.0 pg    MCHC 31.9 (L) 32.0 - 36.0 g/dL    RDW 15.4 (H) 11.5 - 14.5 %    Platelets 132 (L) 150 - 450 K/uL    MPV 10.2 9.2 - 12.9 fL    Immature Granulocytes 2.8 (H) 0.0 - 0.5 %    Gran # (ANC) 12.1 (H) 1.8 - 7.7 K/uL    Immature Grans (Abs) 0.44 (H) 0.00 - 0.04 K/uL    Lymph # 1.3 1.0 - 4.8 K/uL    Mono # 1.6 (H) 0.3 - 1.0 K/uL    Eos # 0.3 0.0 - 0.5 K/uL    Baso # 0.05 0.00 - 0.20 K/uL    nRBC 0 0 /100 WBC    Gran % 76.7 (H) 38.0 - 73.0 %    Lymph % 8.3 (L) 18.0 - 48.0 %    Mono % 10.1 4.0 - 15.0 %    Eosinophil % 1.8 0.0 - 8.0 %    Basophil % 0.3 0.0 - 1.9 %    Platelet Estimate Clumped (A)     Aniso Slight     Poik Slight     Poly Occasional     Ovalocytes Occasional     Tear Drop Cells Occasional     Sharron Cells Occasional     Dohle Bodies Present     Toxic Granulation Present     Large/Giant Platelets Present     Differential Method Automated    Basic Metabolic Panel   Result Value Ref Range    Sodium 140 136 - 145 mmol/L    Potassium 3.5 3.5 - 5.1 mmol/L    Chloride 104 95 - 110 mmol/L    CO2 27 22 - 31 mmol/L    Glucose 108 70 - 110 mg/dL    BUN 13 9 - 21 mg/dL    Creatinine 0.93 0.50 - 1.40 mg/dL     Calcium 8.6 8.4 - 10.2 mg/dL    Anion Gap 9 5 - 12 mmol/L    eGFR >60 >60 mL/min/1.73 m^2   Basic Metabolic Panel   Result Value Ref Range    Sodium 141 136 - 145 mmol/L    Potassium 3.5 3.5 - 5.1 mmol/L    Chloride 107 95 - 110 mmol/L    CO2 27 22 - 31 mmol/L    Glucose 96 70 - 110 mg/dL    BUN 15 9 - 21 mg/dL    Creatinine 1.01 0.50 - 1.40 mg/dL    Calcium 8.5 8.4 - 10.2 mg/dL    Anion Gap 7 5 - 12 mmol/L    eGFR >60 >60 mL/min/1.73 m^2       Assessment:       1. Epididymitis    2. Essential hypertension    3. Coronary artery disease involving native coronary artery of native heart without angina pectoris    4. Intrahepatic cholangiocarcinoma              Plan:       Epididymitis  -     naproxen (NAPROSYN) 500 MG tablet; Take 1 tablet (500 mg total) by mouth 2 (two) times daily as needed (testicular pain).  Dispense: 60 tablet; Refill: 2  -     ciprofloxacin HCl (CIPRO) 500 MG tablet; Take 1 tablet (500 mg total) by mouth 2 (two) times daily. for 10 days  Dispense: 20 tablet; Refill: 0    Essential hypertension    Coronary artery disease involving native coronary artery of native heart without angina pectoris    Intrahepatic cholangiocarcinoma        Complete course of cipro; naproxen PRN scrotal pain/swelling; ice PRN  F/u with oncology as planned; We had a long discussion, and I think his intent is to not continue chemo.  Continue other present meds  Counseled on regular exercise, maintenance of a healthy weight, balanced diet rich in fruits/vegetables and lean protein, and avoidance of unhealthy habits like smoking and excessive alcohol intake.  Continue low fat diet  Xanax at bedtime for tinnitus  F/u as planned

## 2023-08-24 ENCOUNTER — DOCUMENT SCAN (OUTPATIENT)
Dept: HOME HEALTH SERVICES | Facility: HOSPITAL | Age: 78
End: 2023-08-24
Payer: MEDICARE

## 2023-09-07 ENCOUNTER — DOCUMENT SCAN (OUTPATIENT)
Dept: HOME HEALTH SERVICES | Facility: HOSPITAL | Age: 78
End: 2023-09-07
Payer: MEDICARE

## 2023-09-08 ENCOUNTER — HOSPITAL ENCOUNTER (OUTPATIENT)
Facility: HOSPITAL | Age: 78
Discharge: HOSPICE | End: 2023-09-09
Attending: EMERGENCY MEDICINE | Admitting: STUDENT IN AN ORGANIZED HEALTH CARE EDUCATION/TRAINING PROGRAM
Payer: MEDICARE

## 2023-09-08 DIAGNOSIS — Z85.09 HISTORY OF CHOLANGIOCARCINOMA: ICD-10-CM

## 2023-09-08 DIAGNOSIS — Z51.5 COMFORT MEASURES ONLY STATUS: ICD-10-CM

## 2023-09-08 DIAGNOSIS — Z51.5 END OF LIFE CARE: Primary | ICD-10-CM

## 2023-09-08 DIAGNOSIS — C22.1 INTRAHEPATIC CHOLANGIOCARCINOMA: ICD-10-CM

## 2023-09-08 PROCEDURE — 63600175 PHARM REV CODE 636 W HCPCS: Performed by: STUDENT IN AN ORGANIZED HEALTH CARE EDUCATION/TRAINING PROGRAM

## 2023-09-08 PROCEDURE — 63600175 PHARM REV CODE 636 W HCPCS: Performed by: EMERGENCY MEDICINE

## 2023-09-08 PROCEDURE — 96372 THER/PROPH/DIAG INJ SC/IM: CPT | Mod: 59 | Performed by: EMERGENCY MEDICINE

## 2023-09-08 PROCEDURE — 96372 THER/PROPH/DIAG INJ SC/IM: CPT | Performed by: STUDENT IN AN ORGANIZED HEALTH CARE EDUCATION/TRAINING PROGRAM

## 2023-09-08 PROCEDURE — G0378 HOSPITAL OBSERVATION PER HR: HCPCS

## 2023-09-08 PROCEDURE — 25000003 PHARM REV CODE 250: Performed by: STUDENT IN AN ORGANIZED HEALTH CARE EDUCATION/TRAINING PROGRAM

## 2023-09-08 PROCEDURE — 63600175 PHARM REV CODE 636 W HCPCS: Performed by: INTERNAL MEDICINE

## 2023-09-08 PROCEDURE — 63600175 PHARM REV CODE 636 W HCPCS: Performed by: FAMILY MEDICINE

## 2023-09-08 PROCEDURE — 96372 THER/PROPH/DIAG INJ SC/IM: CPT | Performed by: INTERNAL MEDICINE

## 2023-09-08 PROCEDURE — 25000003 PHARM REV CODE 250: Performed by: INTERNAL MEDICINE

## 2023-09-08 PROCEDURE — 96375 TX/PRO/DX INJ NEW DRUG ADDON: CPT

## 2023-09-08 PROCEDURE — 96374 THER/PROPH/DIAG INJ IV PUSH: CPT

## 2023-09-08 PROCEDURE — 99285 EMERGENCY DEPT VISIT HI MDM: CPT | Mod: 25

## 2023-09-08 RX ORDER — SODIUM CHLORIDE 0.9 % (FLUSH) 0.9 %
10 SYRINGE (ML) INJECTION EVERY 12 HOURS PRN
Status: DISCONTINUED | OUTPATIENT
Start: 2023-09-08 | End: 2023-09-09 | Stop reason: HOSPADM

## 2023-09-08 RX ORDER — DIAZEPAM 10 MG/2ML
5 INJECTION INTRAMUSCULAR
Status: COMPLETED | OUTPATIENT
Start: 2023-09-08 | End: 2023-09-08

## 2023-09-08 RX ORDER — MORPHINE SULFATE 4 MG/ML
4 INJECTION, SOLUTION INTRAMUSCULAR; INTRAVENOUS
Status: DISCONTINUED | OUTPATIENT
Start: 2023-09-08 | End: 2023-09-09 | Stop reason: HOSPADM

## 2023-09-08 RX ORDER — ONDANSETRON 4 MG/1
8 TABLET, ORALLY DISINTEGRATING ORAL EVERY 8 HOURS PRN
Status: DISCONTINUED | OUTPATIENT
Start: 2023-09-08 | End: 2023-09-09 | Stop reason: HOSPADM

## 2023-09-08 RX ORDER — LORAZEPAM 2 MG/ML
2 INJECTION INTRAMUSCULAR
Status: DISCONTINUED | OUTPATIENT
Start: 2023-09-08 | End: 2023-09-09 | Stop reason: HOSPADM

## 2023-09-08 RX ORDER — DIAZEPAM 10 MG/2ML
5 INJECTION INTRAMUSCULAR
Status: DISCONTINUED | OUTPATIENT
Start: 2023-09-08 | End: 2023-09-09 | Stop reason: HOSPADM

## 2023-09-08 RX ORDER — HALOPERIDOL 5 MG/ML
5 INJECTION INTRAMUSCULAR
Status: DISCONTINUED | OUTPATIENT
Start: 2023-09-08 | End: 2023-09-09 | Stop reason: HOSPADM

## 2023-09-08 RX ORDER — HALOPERIDOL 5 MG/ML
5 INJECTION INTRAMUSCULAR EVERY 6 HOURS PRN
Status: DISCONTINUED | OUTPATIENT
Start: 2023-09-08 | End: 2023-09-08

## 2023-09-08 RX ORDER — MORPHINE SULFATE 20 MG/ML
10 SOLUTION ORAL
Status: ON HOLD | COMMUNITY
End: 2023-09-08

## 2023-09-08 RX ORDER — MAG HYDROX/ALUMINUM HYD/SIMETH 200-200-20
30 SUSPENSION, ORAL (FINAL DOSE FORM) ORAL 4 TIMES DAILY PRN
Status: DISCONTINUED | OUTPATIENT
Start: 2023-09-08 | End: 2023-09-09 | Stop reason: HOSPADM

## 2023-09-08 RX ORDER — HYDROMORPHONE HYDROCHLORIDE 4 MG/1
4 TABLET ORAL
Status: ON HOLD | COMMUNITY
End: 2023-09-08

## 2023-09-08 RX ORDER — SCOLOPAMINE TRANSDERMAL SYSTEM 1 MG/1
1 PATCH, EXTENDED RELEASE TRANSDERMAL
Status: DISCONTINUED | OUTPATIENT
Start: 2023-09-08 | End: 2023-09-09 | Stop reason: HOSPADM

## 2023-09-08 RX ORDER — MORPHINE SULFATE ORAL SOLUTION 10 MG/5ML
10 SOLUTION ORAL
Status: DISCONTINUED | OUTPATIENT
Start: 2023-09-08 | End: 2023-09-09 | Stop reason: HOSPADM

## 2023-09-08 RX ORDER — OLANZAPINE 5 MG/1
10 TABLET, ORALLY DISINTEGRATING ORAL 2 TIMES DAILY
Status: DISCONTINUED | OUTPATIENT
Start: 2023-09-08 | End: 2023-09-09 | Stop reason: HOSPADM

## 2023-09-08 RX ORDER — MORPHINE SULFATE ORAL SOLUTION 10 MG/5ML
10 SOLUTION ORAL
Status: DISCONTINUED | OUTPATIENT
Start: 2023-09-08 | End: 2023-09-08

## 2023-09-08 RX ORDER — TALC
6 POWDER (GRAM) TOPICAL NIGHTLY PRN
Status: DISCONTINUED | OUTPATIENT
Start: 2023-09-08 | End: 2023-09-09 | Stop reason: HOSPADM

## 2023-09-08 RX ORDER — MORPHINE SULFATE ORAL SOLUTION 10 MG/5ML
10 SOLUTION ORAL EVERY 6 HOURS
Status: DISCONTINUED | OUTPATIENT
Start: 2023-09-08 | End: 2023-09-09 | Stop reason: HOSPADM

## 2023-09-08 RX ORDER — MORPHINE SULFATE ORAL SOLUTION 10 MG/5ML
20 SOLUTION ORAL
Status: DISCONTINUED | OUTPATIENT
Start: 2023-09-08 | End: 2023-09-09 | Stop reason: HOSPADM

## 2023-09-08 RX ORDER — LORAZEPAM 2 MG/ML
0.25 CONCENTRATE ORAL EVERY 4 HOURS PRN
Status: ON HOLD | COMMUNITY
End: 2023-09-08

## 2023-09-08 RX ADMIN — SCOPALAMINE 1 PATCH: 1 PATCH, EXTENDED RELEASE TRANSDERMAL at 11:09

## 2023-09-08 RX ADMIN — LORAZEPAM 2 MG: 2 INJECTION INTRAMUSCULAR; INTRAVENOUS at 08:09

## 2023-09-08 RX ADMIN — MORPHINE SULFATE 10 MG: 10 SOLUTION ORAL at 01:09

## 2023-09-08 RX ADMIN — MORPHINE SULFATE 10 MG: 10 SOLUTION ORAL at 06:09

## 2023-09-08 RX ADMIN — MORPHINE SULFATE 4 MG: 4 INJECTION, SOLUTION INTRAMUSCULAR; INTRAVENOUS at 08:09

## 2023-09-08 RX ADMIN — MORPHINE SULFATE 20 MG: 10 SOLUTION ORAL at 04:09

## 2023-09-08 RX ADMIN — HALOPERIDOL LACTATE 5 MG: 5 INJECTION, SOLUTION INTRAMUSCULAR at 06:09

## 2023-09-08 RX ADMIN — DIAZEPAM 5 MG: 5 INJECTION, SOLUTION INTRAMUSCULAR; INTRAVENOUS at 01:09

## 2023-09-08 RX ADMIN — HALOPERIDOL LACTATE 5 MG: 5 INJECTION, SOLUTION INTRAMUSCULAR at 03:09

## 2023-09-08 RX ADMIN — DIAZEPAM 5 MG: 5 INJECTION, SOLUTION INTRAMUSCULAR; INTRAVENOUS at 10:09

## 2023-09-08 RX ADMIN — DIAZEPAM 5 MG: 5 INJECTION, SOLUTION INTRAMUSCULAR; INTRAVENOUS at 06:09

## 2023-09-08 RX ADMIN — DIAZEPAM 5 MG: 5 INJECTION, SOLUTION INTRAMUSCULAR; INTRAVENOUS at 04:09

## 2023-09-08 NOTE — PLAN OF CARE
[facility]  Initial Discharge Assessment       Primary Care Provider: Zion Yao MD    Admission Diagnosis: End of life care [Z51.5]    Admission Date: 9/8/2023  Expected Discharge Date:     Transition of Care Barriers: None    Payor: [employer]ARE / Plan: GoChime 65 / Product Type: Medicare Advantage /     Extended Emergency Contact Information  Primary Emergency Contact: Evan Garcia Jr   [facility]  Home Phone: [phone]  Mobile Phone: [phone]  Relation: Son  Secondary Emergency Contact: Braydon Garcia  Mobile Phone: [phone]  Relation: Son    Discharge Plan A: Inpatient Hospice  Discharge Plan B: Inpatient Hospice      [id number] [place] - [address] [facility]  [address]  [facility] [address]  Phone: [phone] Fax: [phone]    [facility] [place]  [address]  [facility] [address]  Phone: [phone] Fax: [phone]    Pt previously at home with [facility] and family unable to keep caregivers around the clock to care for him at home.  Pt reportedly in distress overnight and hospice nurse unable to manage symptoms at home.  Pt's sons requesting placement with inpatient hospice in [place] and referrals sent via [facility] to [facility] and [facility].      Initial Assessment (most recent)       Adult Discharge Assessment - 09/08/23 1439          Discharge Assessment    Assessment Type Discharge Planning Assessment     Confirmed/corrected address, phone number and insurance Yes     Confirmed Demographics Correct on Facesheet     Source of Information family;health record     People in Home alone     Do you expect to return to your current living situation? No     Walking or Climbing Stairs ambulation difficulty, dependent;stair climbing difficulty, dependent;transferring difficulty, dependent     Dressing/Bathing bathing difficulty,  dependent;dressing difficulty, dependent     Patient currently being followed by outpatient case management? No     Do you currently have service(s) that help you manage your care at home? Yes     Name and Contact number of agency [facility]     Is the pt/caregiver preference to resume services with current agency No     Do you take prescription medications? Yes     Do you have prescription coverage? Yes     Do you have any problems affording any of your prescribed medications? No     Is the patient taking medications as prescribed? yes     Discharge Plan discussed with: Adult children     Transition of Care Barriers None     Discharge Plan A Inpatient Hospice     Discharge Plan B Inpatient Hospice

## 2023-09-08 NOTE — PHARMACY MED REC
"Admission Medication History     The home medication history was taken by Tu Palmer.    You may go to "Admission" then "Reconcile Home Medications" tabs to review and/or act upon these items.     The home medication list has been updated by the Pharmacy department.   Please read ALL comments highlighted in yellow.   Please address this information as you see fit.    Feel free to contact us if you have any questions or require assistance.        Medications listed below were obtained from: Patient/family, Medications brought from home, and Analytic software- [employer]  No current facility-administered medications on file prior to encounter.     Current Outpatient Medications on File Prior to Encounter   Medication Sig Dispense Refill    ALPRAZolam (XANAX) 0.5 MG tablet Take 1 tablet (0.5 mg total) by mouth 2 (two) times daily as needed (tinitus). 30 tablet 5    amLODIPine (NORVASC) 10 MG tablet TAKE 1 TABLET BY MOUTH EVERY DAY (Patient taking differently: Take 10 mg by mouth once daily.) 90 tablet 3    aspirin (ECOTRIN) 81 MG EC tablet Take 81 mg by mouth once daily.      clopidogreL (PLAVIX) 75 mg tablet Take 1 tablet (75 mg total) by mouth once daily. 90 tablet 3    HYDROcodone-acetaminophen (NORCO)  mg per tablet Take 1 tablet by mouth every 4 (four) hours as needed for Pain. 30 tablet 0    HYDROmorphone (DILAUDID) 4 MG tablet Take 4 mg by mouth every 3 (three) hours as needed for Pain.      isosorbide mononitrate (IMDUR) 60 MG 24 hr tablet Take 60 mg by mouth once daily.      LORazepam (ATIVAN) 2 mg/mL Conc Take 0.25 mLs by mouth every 4 (four) hours as needed (anxiety).      metoprolol succinate (TOPROL-XL) 25 MG 24 hr tablet Take 1 tablet (25 mg total) by mouth once daily. 30 tablet 2    morphine 100 mg/5 mL (20 mg/mL) concentrated solution Take 10 mg by mouth every 2 (two) hours as needed for Pain.      ondansetron (ZOFRAN-ODT) 8 MG TbDL Take 8 mg by mouth every 8 (eight) hours as needed (nausea).   "    prochlorperazine (COMPAZINE) 10 MG tablet Take 10 mg by mouth 3 (three) times daily as needed (nausea).      LIDOcaine-prilocaine (EMLA) cream Apply topically daily as needed (port access).      megestroL (MEGACE) 400 mg/10 mL (40 mg/mL) Susp Take 5 mLs (200 mg total) by mouth once daily. (Patient not taking: Reported on 9/8/2023) 150 mL 2    naproxen (NAPROSYN) 500 MG tablet Take 1 tablet (500 mg total) by mouth 2 (two) times daily as needed (testicular pain). 60 tablet 2         Tu Palmer  EXT 1924                 .

## 2023-09-08 NOTE — PLAN OF CARE
Discharge cancelled due to facility having staffing issues overnight.  Spoke to Lupis with [facility] ([phone]) and met with Pt's son Braydon at bedside.  Plan is for son to meet at facility at 0800 and ambulance transportation arranged through [facility] for 1000.  Nurse can call report to [phone].  Dr. Layne made aware and it is not yet known who will be assigned to Pt in the morning.     09/08/23 1713   Post-Acute Status   Post-Acute Authorization Hospice   Hospice Status Set-up Complete/Auth obtained   Discharge Plan   Discharge Plan A Inpatient Hospice   Discharge Plan B Inpatient Hospice

## 2023-09-08 NOTE — PLAN OF CARE
Pt accepted to [facility] in [place].  Met with Pt's lillian Gordon Jr. And Braydon at bedside and they are in agreement with plan.  Pt can admit to day, pending admissions paperwork and consents being signed.  Will request discharge orders.     09/08/23 1448   Discharge Reassessment   Assessment Type Discharge Planning Reassessment   Discharge Plan A Inpatient Hospice   Discharge Plan B Inpatient Hospice   Post-Acute Status   Hospice Status Set-up Complete/Auth obtained   Discharge Delays None known at this time

## 2023-09-08 NOTE — PLAN OF CARE
Per Jyoti, unable to accept patient d/t patient being in restraints at this time. No answer from [facility] in [facility] left with Doreen        09/08/23 1300   Post-Acute Status   Post-Acute Authorization Hospice   Hospice Status Referrals Sent

## 2023-09-08 NOTE — H&P
[facility]  History & Physical    Patient Name: Evan Garcia  MRN: [id number]  Patient Class: OP- Observation  Admission Date: 9/8/2023  Attending Physician: Villa Layne MD  Primary Care Provider: Zion Yao MD         Patient information was obtained from relative(s), past medical records and ER records.     Subjective:     Principal Problem:End of life care    Chief Complaint:   Chief Complaint   Patient presents with    Altered Mental Status     AMS onset this am, per the son the pt was of normal mentation last night. Pt oriented to self at this time. Pt combative with home health this AM, unusual for the pt.         HPI: 77M with significant PMH of cholangiocarcinoma on hospice is brought by son due to terminal agitation unable to be controlled at home. Son is bedside gives all history. Patient becoming more weak and agitated as time passes. Patient has not eaten in 1 week and has drank very little. [facility] has been seeing him and treating his symptoms at home. Son would like patient's symptoms controlled and placed in inpatient hospice. He is confirmed to be DNR and wishes for comfort care. Patient is mostly lethargic at time of interview but intermittent tries to get out of bed and pull at hester. Admitted to hospital medicine to help control symptoms and pursue disposition with assistance of case management and palliative care team.      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 De Guzman    Tinnitus of both ears     he consulted with ENT; He uses Xanax PRn     Wears glasses        Past Surgical History:   Procedure Laterality Date    CHOLECYSTECTOMY      CORONARY ANGIOGRAPHY  01/06/2020    Procedure: ANGIOGRAM, CORONARY ARTERY;  Surgeon: Uday Tripp MD;  Location: [facility] CATH;  Service: Cardiology;;    coronary artery stent  01/06/2020    HERNIA REPAIR      KNEE SURGERY      right; 3 separate surgeries to repair patellar fracture    LIVER BIOPSY      NEEDLE LOCALIZATION N/A 4/14/2023    Procedure: NEEDLE LOCALIZATION - port placement in cath lab;  Surgeon: Nehemias Miles MD;  Location: [facility] CATH;  Service: Radiology;  Laterality: N/A;  port placement in cath lab    partial liver resection  due to liver mass  02/2021    stapendectomy Bilateral 1985    bilateraly    WISDOM TOOTH EXTRACTION         Review of patient's allergies indicates:   Allergen Reactions    Sulfa (sulfonamide antibiotics) Hives and Other (See Comments)     Decreased white blood count per pt       No current facility-administered medications on file prior to encounter.     Current Outpatient Medications on File Prior to Encounter   Medication Sig    ALPRAZolam (XANAX) 0.5 MG tablet Take 1 tablet (0.5 mg total) by mouth 2 (two) times daily as needed (tinitus).    amLODIPine (NORVASC) 10 MG tablet TAKE 1 TABLET BY MOUTH EVERY DAY (Patient taking differently: Take 10 mg by mouth once daily.)    aspirin (ECOTRIN) 81 MG EC tablet Take 81 mg by mouth once daily.    clopidogreL (PLAVIX) 75 mg tablet Take 1 tablet (75 mg total) by mouth once daily.    HYDROcodone-acetaminophen (NORCO)  mg per tablet Take 1 tablet by mouth every 4 (four) hours as needed for Pain.    HYDROmorphone (DILAUDID) 4 MG tablet Take 4 mg by mouth every 3 (three) hours as needed for Pain.    isosorbide mononitrate (IMDUR) 60 MG 24 hr tablet Take 60 mg by mouth once daily.    LORazepam (ATIVAN) 2 mg/mL Conc Take 0.25 mLs by mouth every 4 (four) hours as needed (anxiety).    metoprolol succinate  (TOPROL-XL) 25 MG 24 hr tablet Take 1 tablet (25 mg total) by mouth once daily.    morphine 100 mg/5 mL (20 mg/mL) concentrated solution Take 10 mg by mouth every 2 (two) hours as needed for Pain.    ondansetron (ZOFRAN-ODT) 8 MG TbDL Take 8 mg by mouth every 8 (eight) hours as needed (nausea).    prochlorperazine (COMPAZINE) 10 MG tablet Take 10 mg by mouth 3 (three) times daily as needed (nausea).    LIDOcaine-prilocaine (EMLA) cream Apply topically daily as needed (port access).    megestroL (MEGACE) 400 mg/10 mL (40 mg/mL) Susp Take 5 mLs (200 mg total) by mouth once daily. (Patient not taking: Reported on 9/8/2023)    naproxen (NAPROSYN) 500 MG tablet Take 1 tablet (500 mg total) by mouth 2 (two) times daily as needed (testicular pain).     Family History       Problem Relation (Age of Onset)    Aneurysm Father    Cancer Mother, Brother, Brother    Diabetes Mother    Melanoma Mother          Tobacco Use    Smoking status: Former     Current packs/day: 0.50     Average packs/day: 0.5 packs/day for 20.0 years (10.0 ttl pk-yrs)     Types: Cigars, Cigarettes     Start date: 4/24/2014    Smokeless tobacco: Never   Substance and Sexual Activity    Alcohol use: Not Currently    Drug use: No    Sexual activity: Not on file     Review of Systems   Unable to perform ROS: Acuity of condition     Objective:     Vital Signs (Most Recent):  Temp: 96.2 °F (35.7 °C) (09/08/23 1314)  Pulse: 110 (09/08/23 1314)  Resp: 18 (09/08/23 1314)  BP: (!) 187/77 (09/08/23 1314)  SpO2: 95 % (09/08/23 1314) Vital Signs (24h Range):  Temp:  [96.2 °F (35.7 °C)-98.4 °F (36.9 °C)] 96.2 °F (35.7 °C)  Pulse:  [107-111] 110  Resp:  [18-27] 18  SpO2:  [93 %-96 %] 95 %  BP: (125-187)/(69-85) 187/77     Weight: 75.5 kg (166 lb 7.2 oz)  Body mass index is 24.58 kg/m².     Physical Exam  Vitals reviewed.   Constitutional:       General: He is not in acute distress.     Appearance: He is ill-appearing.   HENT:      Head: Normocephalic and  atraumatic.   Cardiovascular:      Rate and Rhythm: Regular rhythm. Tachycardia present.   Pulmonary:      Effort: Pulmonary effort is normal. No respiratory distress.   Abdominal:      General: Abdomen is protuberant.      Palpations: Abdomen is soft.      Comments: Well-healed scars   Genitourinary:     Comments: hester  Musculoskeletal:      Right lower leg: No edema.      Left lower leg: No edema.   Skin:     General: Skin is warm and dry.      Findings: Lesion (scattered seborrheic keratoses) present.   Neurological:      Mental Status: He is alert.      Comments: Lethargic and disoriented   Psychiatric:         Behavior: Behavior is agitated (intermittent).              No labs or imaging performed due to DNR with comfort care.    Assessment/Plan:     * End of life care  - pursuing inpatient hospice  - consults to case management and palliative care    Comfort measures only status  - scopalamine patch  - prn meds for pain, air hunger, anxiety, agitation, nausea, heartburn: morphine, haldol, diazepam, zofran, maalox    Intrahepatic cholangiocarcinoma  Noted hx      I spent 16 minutes discussing goals of care with son including code status of DNR with comfort care only and continued pursuit of hospice with inpatient goal.       On 09/08/2023, patient should be placed in hospital observation services under my care.        Villa Layne MD  Department of Hospital Medicine  [facility]

## 2023-09-08 NOTE — ED PROVIDER NOTES
Encounter Date: 9/8/2023       History     Chief Complaint   Patient presents with    Altered Mental Status     AMS onset this am, per the son the pt was of normal mentation last night. Pt oriented to self at this time. Pt combative with home health this AM, unusual for the pt.      77-year-old male with history of coronary artery disease, degenerative disc disease, BPH, end-stage intrahepatic cholangiocarcinoma on hospice care.  Patient currently being managed by [facility]  According to patient's son who is his primary caregiver patient has become increasingly confused and combative throughout there stay at home over last 24 hours.  Patient has not had anything to eat or drink in a proximally 1 week.  Currently was trying to be managed throughout the night by hospice nurse.  At the advice of the hospice agency showed come to the emergency department for assistance with sedation.  Patient did receive 80 mg of morphine prior to arrival emergency department.  Per son denies fever, no vomiting, no diarrhea.      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 De Guzman    Tinnitus of both ears     he consulted with ENT; He uses Xanax PRn    Wears glasses      Past Surgical History:   Procedure Laterality Date    CHOLECYSTECTOMY      CORONARY ANGIOGRAPHY  01/06/2020    Procedure: ANGIOGRAM, CORONARY ARTERY;  Surgeon: Uday Tripp MD;  Location: [facility]  CATH;  Service: Cardiology;;    coronary artery stent  01/06/2020    HERNIA REPAIR      KNEE SURGERY      right; 3 separate surgeries to repair patellar fracture    LIVER BIOPSY      NEEDLE LOCALIZATION N/A 4/14/2023    Procedure: NEEDLE LOCALIZATION - port placement in cath lab;  Surgeon: Nehemias Miles MD;  Location: [facility] CATH;  Service: Radiology;  Laterality: N/A;  port placement in cath lab    partial liver resection  due to liver mass  02/2021    stapendectomy Bilateral 1985    bilateraly    WISDOM TOOTH EXTRACTION       Family History   Problem Relation Age of Onset    Aneurysm Father     Cancer Mother         Lymphoma    Diabetes Mother     Melanoma Mother     Cancer Brother         salivary    Cancer Brother         Lymphoma     Social History     Tobacco Use    Smoking status: Former     Current packs/day: 0.50     Average packs/day: 0.5 packs/day for 20.0 years (10.0 ttl pk-yrs)     Types: Cigars, Cigarettes     Start date: 4/24/2014    Smokeless tobacco: Never   Substance Use Topics    Alcohol use: Not Currently    Drug use: No     Review of Systems   Constitutional:  Negative for fatigue and fever.   HENT:  Negative for sore throat.    Respiratory:  Negative for shortness of breath.    Cardiovascular:  Negative for chest pain.   Gastrointestinal:  Negative for nausea.   Genitourinary:  Negative for dysuria.   Musculoskeletal:  Negative for back pain.   Skin:  Negative for rash.   Neurological:  Negative for weakness.   Hematological:  Does not bruise/bleed easily.   Psychiatric/Behavioral:  Positive for agitation and confusion. The patient is nervous/anxious.        Physical Exam     Initial Vitals [09/08/23 0832]   BP Pulse Resp Temp SpO2   125/69 107 20 98.4 °F (36.9 °C) (!) 93 %      MAP       --         Physical Exam    Nursing note and vitals reviewed.  Constitutional: He appears well-developed and well-nourished. He appears distressed.   Patient encephalopathic, slightly agitated   HENT:    Head: Normocephalic and atraumatic.   Nose: Nose normal.   Mouth/Throat: Oropharynx is clear and moist.   Eyes: Conjunctivae and EOM are normal. Pupils are equal, round, and reactive to light. No scleral icterus.   Neck: Neck supple.   Normal range of motion.  Cardiovascular:  Normal rate, regular rhythm, normal heart sounds and intact distal pulses.     Exam reveals no gallop and no friction rub.       No murmur heard.  Pulmonary/Chest: No stridor. No respiratory distress.   Course bilateral breath sounds no adventitious sounds   Abdominal: Abdomen is soft. Bowel sounds are normal. He exhibits no mass. There is no abdominal tenderness. There is no rebound and no guarding.   Musculoskeletal:         General: No edema. Normal range of motion.      Cervical back: Normal range of motion and neck supple.     Lymphadenopathy:     He has no cervical adenopathy.   Neurological: He has normal strength and normal reflexes. He displays normal reflexes. No cranial nerve deficit or sensory deficit. GCS score is 15. GCS eye subscore is 4. GCS verbal subscore is 5. GCS motor subscore is 6.   Skin: Skin is warm and dry. Capillary refill takes less than 2 seconds. No rash noted.   Psychiatric:   Agitated, confused, not following commands         ED Course   Procedures  Labs Reviewed - No data to display       Imaging Results    None          Medications   ondansetron disintegrating tablet 8 mg (has no administration in time range)   aluminum-magnesium hydroxide-simethicone 200-200-20 mg/5 mL suspension 30 mL (has no administration in time range)   morphine 10 mg/5 mL solution 10 mg (has no administration in time range)   haloperidol lactate injection 5 mg (has no administration in time range)   diazePAM injection 5 mg (has no administration in time range)   scopolamine 1.3-1.5 mg (1 mg over 3 days) 1 patch (1 patch Transdermal Patch Applied 9/8/23 1123)   diazePAM injection 5 mg (5 mg Intramuscular Given 9/8/23 1022)     Medical  Decision Making  Risk  Prescription drug management.                          Medical Decision Making:   Initial Assessment:   77-year-old male with history of coronary artery disease, degenerative disc disease, BPH, end-stage intrahepatic cholangiocarcinoma on hospice care.  Patient currently being managed by [facility] ([facility]  According to patient's son who is his primary caregiver patient has become increasingly confused and combative throughout there stay at home over last 24 hours.  Patient has not had anything to eat or drink in a proximally 1 week.  Currently was trying to be managed throughout the night by hospice nurse.  At the advice of the hospice agency showed come to the emergency department for assistance with sedation.  Patient did receive 80 mg of morphine prior to arrival emergency department.  Per son denies fever, no vomiting, no diarrhea.    Differential Diagnosis:   Encephalopathy, electrolyte abnormalities, dehydration, advanced cholangiocarcinoma  ED Management:  Patient seen evaluated emergency department.  Patient found to be acutely encephalopathic.  Was pulling at his Jasso catheter and confused.  Case was discussed with hospice physician ..  At this time feels patient should be transitioned to inpatient hospice management.  At this time grease patient will need inpatient hospice.  Will admit to hospital medicine and case management consulted to arrange further coordination of care.  Patient was given Valium 5 mg IM in emergency department.      Clinical Impression:   Final diagnoses:  [Z51.5] End of life care  [Z85.09] History of cholangiocarcinoma (Primary)        ED Disposition Condition    Observation                 Alonso Escobar MD  09/08/23 0062

## 2023-09-08 NOTE — PROGRESS NOTES
Nurses Note -- 4 Eyes      9/8/2023   4:08 PM      Skin assessed during: Admit      [x] No Altered Skin Integrity Present    []Prevention Measures Documented      [] Yes- Altered Skin Integrity Present or Discovered   [] LDA Added if Not in Epic (Describe Wound)   [] New Altered Skin Integrity was Present on Admit and Documented in LDA   [] Wound Image Taken    Wound Care Consulted? No    Attending Nurse:  Marlena Sotelo RN/Staff Member:  Inocente Abdul RN

## 2023-09-08 NOTE — DISCHARGE SUMMARY
Pending sale to [facility]  Discharge Summary      Patient Name: Evan Garcia  MRN: [id number]  SEBASTIEN: [id number]  Patient Class: OP- Observation  Admission Date: 9/8/2023  Hospital Length of Stay: 0 days  Discharge Date and Time:  09/08/2023 2:59 PM  Attending Physician: Villa Layne MD   Discharging Provider: Villa Layne MD  Primary Care Provider: Zion Yao MD    Primary Care Team: Networked reference to record PCT     HPI:   77M with significant PMH of cholangiocarcinoma on hospice is brought by son due to terminal agitation unable to be controlled at home. Son is bedside gives all history. Patient becoming more weak and agitated as time passes. Patient has not eaten in 1 week and has drank very little. [facility] has been seeing him and treating his symptoms at home. Son would like patient's symptoms controlled and placed in inpatient hospice. He is confirmed to be DNR and wishes for comfort care. Patient is mostly lethargic at time of interview but intermittent tries to get out of bed and pull at hester. Admitted to hospital medicine to help control symptoms and pursue disposition with assistance of case management and palliative care team.      * No surgery found *      Hospital Course:   77M on home hospice due to end-of-life care admitted due to terminal agitation and pain at home uncontrolled. Treated with comfort care measures only. Consulted with case management and palliative care. He was accepted and transferred to [facility] in [place], [place]. Patient seen and examined on day of discharge.    Physical exam on day of discharge:  Constitutional:       General: He is not in acute distress.     Appearance: He is ill-appearing.   HENT:      Head: Normocephalic and atraumatic.   Cardiovascular:      Rate and Rhythm: Regular rhythm. Tachycardia present.   Pulmonary:      Effort: Pulmonary effort is normal. No respiratory distress.    Abdominal:      General: Abdomen is protuberant.      Palpations: Abdomen is soft.      Comments: Well-healed scars   Genitourinary:     Comments: hester  Musculoskeletal:      Right lower leg: No edema.      Left lower leg: No edema.   Skin:     General: Skin is warm and dry.      Findings: Lesion (scattered seborrheic keratoses) present.   Neurological:      Mental Status: He is alert.      Comments: Lethargic and disoriented   Psychiatric:         Behavior: Behavior is agitated (intermittent).          Goals of Care Treatment Preferences:  Code Status: DNR      Consults:   Consults (From admission, onward)        Status Ordering Provider     Inpatient consult to Palliative Care  Once        Provider:  Robinson Mcguire MD    Acknowledged ONEYDA GRIMES     Inpatient consult to Social Work/Case Management  Once        Provider:  (Not yet assigned)    Acknowledged ONEYDA GRIMES new Assessment & Plan notes have been filed under this hospital service since the last note was generated.  Service: Hospital Medicine    Final Active Diagnoses:    Diagnosis Date Noted POA    PRINCIPAL PROBLEM:  End of life care [Z51.5] 09/08/2023 Not Applicable    Comfort measures only status [Z51.5] 09/08/2023 Not Applicable    Intrahepatic cholangiocarcinoma [C22.1] 03/22/2022 Yes      Problems Resolved During this Admission:       Discharged Condition: poor    Disposition: Hospice/Medical Facility    Follow Up:   Follow-up Information     Hospice facility. Go today.                     Patient Instructions:      Diet Adult Regular     Activity as tolerated       Significant Diagnostic Studies: none    Pending Diagnostic Studies:     None         Medications:  Reconciled Home Medications:      Medication List      STOP taking these medications    ALPRAZolam 0.5 MG tablet  Commonly known as: XANAX     amLODIPine 10 MG tablet  Commonly known as: NORVASC     aspirin 81 MG EC tablet  Commonly known as: ECOTRIN      clopidogreL 75 mg tablet  Commonly known as: PLAVIX     HYDROcodone-acetaminophen  mg per tablet  Commonly known as: NORCO     HYDROmorphone 4 MG tablet  Commonly known as: DILAUDID     isosorbide mononitrate 60 MG 24 hr tablet  Commonly known as: IMDUR     LIDOcaine-prilocaine cream  Commonly known as: EMLA     LORazepam 2 mg/mL Conc  Commonly known as: ATIVAN     megestroL 400 mg/10 mL (40 mg/mL) Susp  Commonly known as: MEGACE     metoprolol succinate 25 MG 24 hr tablet  Commonly known as: TOPROL-XL     morphine 100 mg/5 mL (20 mg/mL) concentrated solution     naproxen 500 MG tablet  Commonly known as: NAPROSYN     ondansetron 8 MG Tbdl  Commonly known as: ZOFRAN-ODT     prochlorperazine 10 MG tablet  Commonly known as: COMPAZINE            Indwelling Lines/Drains at time of discharge:   Lines/Drains/Airways     Central Venous Catheter Line  Duration                PowerPort A Cath Single Lumen 08/04/23 0730 Atrial Right 35 days          Drain  Duration                Urethral Catheter 09/08/23 1211 <1 day                Time spent on the discharge of patient: 40 minutes         Villa Layne MD  Department of Hospital Medicine  [facility]

## 2023-09-08 NOTE — SUBJECTIVE & OBJECTIVE
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 De Guzman    Tinnitus of both ears     he consulted with ENT; He uses Xanax PRn    Wears glasses        Past Surgical History:   Procedure Laterality Date    CHOLECYSTECTOMY      CORONARY ANGIOGRAPHY  01/06/2020    Procedure: ANGIOGRAM, CORONARY ARTERY;  Surgeon: Uday Tripp MD;  Location: [facility] CATH;  Service: Cardiology;;    coronary artery stent  01/06/2020    HERNIA REPAIR      KNEE SURGERY      right; 3 separate surgeries to repair patellar fracture    LIVER BIOPSY      NEEDLE LOCALIZATION N/A 4/14/2023    Procedure: NEEDLE LOCALIZATION - port placement in cath lab;  Surgeon: Nehemias Miles MD;  Location: [facility] CATH;  Service: Radiology;  Laterality: N/A;  port placement in cath lab    partial liver resection  due to liver mass  02/2021    stapendectomy Bilateral 1985    bilateraly    WISDOM TOOTH EXTRACTION         Review of patient's allergies indicates:   Allergen Reactions    Sulfa (sulfonamide antibiotics) Hives and Other (See Comments)     Decreased white blood count per pt       No current facility-administered medications on file prior to encounter.     Current Outpatient Medications on File Prior to Encounter   Medication Sig    ALPRAZolam (XANAX) 0.5 MG tablet Take 1 tablet (0.5 mg total) by mouth 2 (two) times daily as needed (tinitus).    amLODIPine (NORVASC) 10 MG tablet TAKE 1 TABLET BY MOUTH EVERY DAY (Patient taking differently: Take 10 mg by mouth once daily.)    aspirin (ECOTRIN) 81 MG EC tablet Take 81 mg by mouth once daily.    clopidogreL (PLAVIX) 75 mg  tablet Take 1 tablet (75 mg total) by mouth once daily.    HYDROcodone-acetaminophen (NORCO)  mg per tablet Take 1 tablet by mouth every 4 (four) hours as needed for Pain.    HYDROmorphone (DILAUDID) 4 MG tablet Take 4 mg by mouth every 3 (three) hours as needed for Pain.    isosorbide mononitrate (IMDUR) 60 MG 24 hr tablet Take 60 mg by mouth once daily.    LORazepam (ATIVAN) 2 mg/mL Conc Take 0.25 mLs by mouth every 4 (four) hours as needed (anxiety).    metoprolol succinate (TOPROL-XL) 25 MG 24 hr tablet Take 1 tablet (25 mg total) by mouth once daily.    morphine 100 mg/5 mL (20 mg/mL) concentrated solution Take 10 mg by mouth every 2 (two) hours as needed for Pain.    ondansetron (ZOFRAN-ODT) 8 MG TbDL Take 8 mg by mouth every 8 (eight) hours as needed (nausea).    prochlorperazine (COMPAZINE) 10 MG tablet Take 10 mg by mouth 3 (three) times daily as needed (nausea).    LIDOcaine-prilocaine (EMLA) cream Apply topically daily as needed (port access).    megestroL (MEGACE) 400 mg/10 mL (40 mg/mL) Susp Take 5 mLs (200 mg total) by mouth once daily. (Patient not taking: Reported on 9/8/2023)    naproxen (NAPROSYN) 500 MG tablet Take 1 tablet (500 mg total) by mouth 2 (two) times daily as needed (testicular pain).     Family History       Problem Relation (Age of Onset)    Aneurysm Father    Cancer Mother, Brother, Brother    Diabetes Mother    Melanoma Mother          Tobacco Use    Smoking status: Former     Current packs/day: 0.50     Average packs/day: 0.5 packs/day for 20.0 years (10.0 ttl pk-yrs)     Types: Cigars, Cigarettes     Start date: 4/24/2014    Smokeless tobacco: Never   Substance and Sexual Activity    Alcohol use: Not Currently    Drug use: No    Sexual activity: Not on file     Review of Systems   Unable to perform ROS: Acuity of condition     Objective:     Vital Signs (Most Recent):  Temp: 96.2 °F (35.7 °C) (09/08/23 1314)  Pulse: 110 (09/08/23 1314)  Resp: 18 (09/08/23 1314)  BP: (!)  187/77 (09/08/23 1314)  SpO2: 95 % (09/08/23 1314) Vital Signs (24h Range):  Temp:  [96.2 °F (35.7 °C)-98.4 °F (36.9 °C)] 96.2 °F (35.7 °C)  Pulse:  [107-111] 110  Resp:  [18-27] 18  SpO2:  [93 %-96 %] 95 %  BP: (125-187)/(69-85) 187/77     Weight: 75.5 kg (166 lb 7.2 oz)  Body mass index is 24.58 kg/m².     Physical Exam  Vitals reviewed.   Constitutional:       General: He is not in acute distress.     Appearance: He is ill-appearing.   HENT:      Head: Normocephalic and atraumatic.   Cardiovascular:      Rate and Rhythm: Regular rhythm. Tachycardia present.   Pulmonary:      Effort: Pulmonary effort is normal. No respiratory distress.   Abdominal:      General: Abdomen is protuberant.      Palpations: Abdomen is soft.      Comments: Well-healed scars   Genitourinary:     Comments: hester  Musculoskeletal:      Right lower leg: No edema.      Left lower leg: No edema.   Skin:     General: Skin is warm and dry.      Findings: Lesion (scattered seborrheic keratoses) present.   Neurological:      Mental Status: He is alert.      Comments: Lethargic and disoriented   Psychiatric:         Behavior: Behavior is agitated (intermittent).              No labs or imaging performed due to DNR with comfort care.

## 2023-09-08 NOTE — HOSPITAL COURSE
77M on home hospice due to end-of-life care admitted due to terminal agitation and pain at home uncontrolled. Treated with comfort care measures only. Consulted with case management and palliative care. He was accepted and transferred to [facility] in [place], [place]. Patient seen and examined on day of discharge.    Physical exam on day of discharge:  Constitutional:       General: He is not in acute distress.     Appearance: He is ill-appearing.   HENT:      Head: Normocephalic and atraumatic.   Cardiovascular:      Rate and Rhythm: Regular rhythm. Tachycardia present.   Pulmonary:      Effort: Pulmonary effort is normal. No respiratory distress.   Abdominal:      General: Abdomen is protuberant.      Palpations: Abdomen is soft.      Comments: Well-healed scars   Genitourinary:     Comments: hester  Musculoskeletal:      Right lower leg: No edema.      Left lower leg: No edema.   Skin:     General: Skin is warm and dry.      Findings: Lesion (scattered seborrheic keratoses) present.   Neurological:      Mental Status: He is alert.      Comments: Lethargic and disoriented   Psychiatric:         Behavior: Behavior is agitated (intermittent).

## 2023-09-08 NOTE — CONSULTS
Consult noted; chart reviewed. Pt is a DNR; no ACP docs noted. Pt lying in bed in no acute distress; unable to participate.   Both Sons Jr Evan and Braydon @ the bedside along with Sister Yesenia. Introduced Palliative care and our services. They were agreeable to speaking.     Pt came from home on [facility] with uncontrollable symptoms that can no longer be managed at home. Son, Evan Armstrong has been staying with the patient. He reports he has been awake for 3 days straight trying to care for his Father properly. Home is no longer a safe option. They are aware pt has liver cancer and has had multiple rounds of treatments which landed him in the hospital with dehydration each time. The pt himself decided to forego treatment and go home with hospice. Son states pt has not had anything significant to eat/drink in about a week. Pt can no longer make decisions for himself. Both Sons wish for the pt to continue hospice and be kept comfortable in a facility. They report his quality of life this entire year has been poor. He has had multiple hospital admissions.     Advance Care Planning     Date: 09/08/2023    [facility]  I engaged the family in a voluntary conversation about advance care planning and we specifically addressed what the goals of care would be moving forward, in light of the patient's change in clinical status, specifically Liver cancer with terminal agitation.  We did specifically address the patient's likely prognosis, which is poor.  We explored the patient's values and preferences for future care.  The family endorses that what is most important right now is to focus on comfort and QOL     Accordingly, we have decided that the best plan to meet the patient's goals includes enrolling in hospice care    I did explain the role for hospice care at this stage of the patient's illness, including its ability to help the patient live with the best quality of life possible.  We will be making an  inpatient hospice referral today. [facility] in [place] has accepted and family has agreed. Dr. Mcguire and nursing staff updated.     I spent a total of 35 minutes engaging the patient in this advance care planning discussion.         Family requests pt to be visited by a . Called [facility] and requested a visit asap.

## 2023-09-08 NOTE — ASSESSMENT & PLAN NOTE
- scopalamine patch  - prn meds for pain, air hunger, anxiety, agitation, nausea, heartburn: morphine, haldol, diazepam, zofran, maalox

## 2023-09-08 NOTE — PLAN OF CARE
Pt not oriented. Minimally following instructions. Continues to need safety sitter. Pain meds po given Q1H. Haldol IM given Q3H, valium IM given Q2H. fLACC pain scale used, 5/10. Jasso catheter removed due to pt kept pulling at line. Port not accessed due to pt pulling at lines. Brief in place. Waffle overlay on bed. Pt moving frequently in bed. Plan for in-pt hospice tomorrow.       Problem: Adult Inpatient Plan of Care  Goal: Optimal Comfort and Wellbeing  Outcome: Ongoing, Not Progressing     Problem: Adult Inpatient Plan of Care  Goal: Plan of Care Review  Outcome: Ongoing, Progressing

## 2023-09-08 NOTE — HPI
77M with significant PMH of cholangiocarcinoma on hospice is brought by son due to terminal agitation unable to be controlled at home. Son is bedside gives all history. Patient becoming more weak and agitated as time passes. Patient has not eaten in 1 week and has drank very little. [facility] has been seeing him and treating his symptoms at home. Son would like patient's symptoms controlled and placed in inpatient hospice. He is confirmed to be DNR and wishes for comfort care. Patient is mostly lethargic at time of interview but intermittent tries to get out of bed and pull at hester. Admitted to hospital medicine to help control symptoms and pursue disposition with assistance of case management and palliative care team.

## 2023-09-09 VITALS
RESPIRATION RATE: 18 BRPM | BODY MASS INDEX: 24.65 KG/M2 | DIASTOLIC BLOOD PRESSURE: 77 MMHG | TEMPERATURE: 96 F | OXYGEN SATURATION: 95 % | WEIGHT: 166.44 LBS | HEART RATE: 110 BPM | SYSTOLIC BLOOD PRESSURE: 187 MMHG | HEIGHT: 69 IN

## 2023-09-09 PROCEDURE — 96376 TX/PRO/DX INJ SAME DRUG ADON: CPT

## 2023-09-09 PROCEDURE — 63600175 PHARM REV CODE 636 W HCPCS: Performed by: FAMILY MEDICINE

## 2023-09-09 PROCEDURE — G0378 HOSPITAL OBSERVATION PER HR: HCPCS

## 2023-09-09 RX ADMIN — LORAZEPAM 2 MG: 2 INJECTION INTRAMUSCULAR; INTRAVENOUS at 02:09

## 2023-09-09 RX ADMIN — MORPHINE SULFATE 4 MG: 4 INJECTION, SOLUTION INTRAMUSCULAR; INTRAVENOUS at 10:09

## 2023-09-09 RX ADMIN — LORAZEPAM 2 MG: 2 INJECTION INTRAMUSCULAR; INTRAVENOUS at 10:09

## 2023-09-09 RX ADMIN — MORPHINE SULFATE 4 MG: 4 INJECTION, SOLUTION INTRAMUSCULAR; INTRAVENOUS at 03:09

## 2023-09-09 NOTE — DISCHARGE SUMMARY
[facility] Medicine  Discharge Summary      Patient Name: Evan Garcia  MRN: [id number]  SEBASTIEN: [id number]  Patient Class: OP- Observation  Admission Date: 9/8/2023  Hospital Length of Stay: 0 days  Discharge Date and Time: 9/9/2023 10:48 AM  Attending Physician: No att. providers found   Discharging Provider: Patt Johnson MD  Primary Care Provider: Zion Yao MD    Primary Care Team: Networked reference to record PCT     HPI:   77M with significant PMH of cholangiocarcinoma on hospice is brought by son due to terminal agitation unable to be controlled at home. Son is bedside gives all history. Patient becoming more weak and agitated as time passes. Patient has not eaten in 1 week and has drank very little. [facility] has been seeing him and treating his symptoms at home. Son would like patient's symptoms controlled and placed in inpatient hospice. He is confirmed to be DNR and wishes for comfort care. Patient is mostly lethargic at time of interview but intermittent tries to get out of bed and pull at hester. Admitted to hospital medicine to help control symptoms and pursue disposition with assistance of case management and palliative care team.      * No surgery found *      Hospital Course:   77M on home hospice due to end-of-life care admitted due to terminal agitation and pain at home uncontrolled. Treated with comfort care measures only. Consulted with case management and palliative care. He was accepted and transferred to [facility] in [place], [place]. Patient seen and examined on day of discharge.    Physical exam on day of discharge:  Constitutional:       General: He is not in acute distress.     Appearance: He is ill-appearing.   HENT:      Head: Normocephalic and atraumatic.   Cardiovascular:      Rate and Rhythm: Regular rhythm. Tachycardia present.   Pulmonary:      Effort: Pulmonary effort is normal. No respiratory distress.    Abdominal:      General: Abdomen is protuberant.      Palpations: Abdomen is soft.      Comments: Well-healed scars   Genitourinary:     Comments: hester  Musculoskeletal:      Right lower leg: No edema.      Left lower leg: No edema.   Skin:     General: Skin is warm and dry.      Findings: Lesion (scattered seborrheic keratoses) present.   Neurological:      Mental Status: He is alert.      Comments: Lethargic and disoriented   Psychiatric:         Behavior: Behavior is agitated (intermittent).          Goals of Care Treatment Preferences:  Code Status: DNR          What is most important right now is to focus on comfort and QOL .  Accordingly, we have decided that the best plan to meet the patient's goals includes enrolling in hospice care.      Consults:   Consults (From admission, onward)        Status Ordering Provider     Inpatient consult to Palliative Care  Once        Provider:  Robinson Mcguire MD    Completed ONEYDA GRIMES     Inpatient consult to Social Work/Case Management  Once        Provider:  (Not yet assigned)    Acknowledged ONEYDA GRIMES          No new Assessment & Plan notes have been filed under this hospital service since the last note was generated.  Service: Hospital Medicine    Final Active Diagnoses:    Diagnosis Date Noted POA    PRINCIPAL PROBLEM:  End of life care [Z51.5] 09/08/2023 Not Applicable    Comfort measures only status [Z51.5] 09/08/2023 Not Applicable    Intrahepatic cholangiocarcinoma [C22.1] 03/22/2022 Yes      Problems Resolved During this Admission:       Discharged Condition: poor    Disposition: Hospice/Medical Facility    Follow Up:   Follow-up Information     Hospice facility. Go today.                     Patient Instructions:      Diet Adult Regular     Activity as tolerated       Significant Diagnostic Studies: N/A    Pending Diagnostic Studies:     None         Medications:  Reconciled Home Medications:      Medication List      STOP taking these  medications    ALPRAZolam 0.5 MG tablet  Commonly known as: XANAX     amLODIPine 10 MG tablet  Commonly known as: NORVASC     aspirin 81 MG EC tablet  Commonly known as: ECOTRIN     clopidogreL 75 mg tablet  Commonly known as: PLAVIX     HYDROcodone-acetaminophen  mg per tablet  Commonly known as: NORCO     HYDROmorphone 4 MG tablet  Commonly known as: DILAUDID     isosorbide mononitrate 60 MG 24 hr tablet  Commonly known as: IMDUR     LIDOcaine-prilocaine cream  Commonly known as: EMLA     LORazepam 2 mg/mL Conc  Commonly known as: ATIVAN     megestroL 400 mg/10 mL (40 mg/mL) Susp  Commonly known as: MEGACE     metoprolol succinate 25 MG 24 hr tablet  Commonly known as: TOPROL-XL     morphine 100 mg/5 mL (20 mg/mL) concentrated solution     naproxen 500 MG tablet  Commonly known as: NAPROSYN     ondansetron 8 MG Tbdl  Commonly known as: ZOFRAN-ODT     prochlorperazine 10 MG tablet  Commonly known as: COMPAZINE            Indwelling Lines/Drains at time of discharge:   Lines/Drains/Airways     Central Venous Catheter Line  Duration                PowerPort A Cath Single Lumen 08/04/23 0730 Atrial Right 36 days                Time spent on the discharge of patient: 15 minutes         Patt Johnson MD  Department of Hospital Medicine  [facility]

## 2023-09-09 NOTE — NURSING
[facility] arrived to  pt to transfer to [facility].  Discharge instructions as well as code status provided to Mariama.  Pt belongings provided to son who is at bedside.  Case management as well as [place] request IV stay in place.  Pt transferred to [facility].  Pt left unit in stable condition via EMS.

## 2023-09-09 NOTE — PLAN OF CARE
Patient to transfer to [facility] facility via ambulance transportation.   Discharge orders and chart reviewed with no further post-acute discharge needs identified at this time.  At this time, patient is cleared for discharge from Case Management standpoint.        09/09/23 0855   Final Note   Assessment Type Final Discharge Note   Anticipated Discharge Disposition [facility]ospiceMedic   Post-Acute Status   Post-Acute Authorization Hospice   Hospice Status Set-up Complete/Auth obtained   Discharge Delays None known at this time

## 2023-09-12 ENCOUNTER — DOCUMENT SCAN (OUTPATIENT)
Dept: HOME HEALTH SERVICES | Facility: HOSPITAL | Age: 78
End: 2023-09-12
Payer: MEDICARE

## 2023-09-13 ENCOUNTER — DOCUMENT SCAN (OUTPATIENT)
Dept: HOME HEALTH SERVICES | Facility: HOSPITAL | Age: 78
End: 2023-09-13
Payer: MEDICARE

## 2025-01-15 NOTE — PROGRESS NOTES
Audiologist referral entered. Made pt aware via mcm   [facility] New Patient Evaluation    Referred by: Nelsy Dennis PA-C  Reason for referral: coccyx pain    CC:   Chief Complaint   Patient presents with    Tailbone Pain     fall 6 months ago      Last 3 PDI Scores 9/10/2020   Pain Disability Index (PDI) 10       HPI:   Evan Garcia is a 74 y.o. male who complains of coccyx pain    Onset: 1 year ago  Inciting Event: fell twice and landed on his tailbone both times (1 year ago and 6 months ago)  Progression: since onset, pain is gradually worsening  Typical Range: 2-10/10  Timing: intermittent  Quality: aching  Radiation: no  Associated numbness or weakness: no numbness, no weakness   Exacerbated by: sitting  Allievated by: standing  Is Pain Level Acceptable?: No    Previous Therapies:  PT/OT:   HEP:   Interventions:   Surgery:  Medications:   - NSAIDS: avoids, on plavix  - MSK Relaxants:   - TCAs:   - SNRIs:   - Topicals:   - Anticonvulsants:  - Opioids:     History:    Current Outpatient Medications:     acetaminophen (TYLENOL) 325 MG tablet, Take 1 tablet (325 mg total) by mouth every 6 (six) hours as needed for Pain., Disp: , Rfl:     ALPRAZolam (XANAX) 0.5 MG tablet, Take 1 tablet (0.5 mg total) by mouth 2 (two) times daily as needed (tinitus)., Disp: 45 tablet, Rfl: 5    amLODIPine (NORVASC) 10 MG tablet, Take 1 tablet (10 mg total) by mouth once daily., Disp: 90 tablet, Rfl: 3    aspirin (ECOTRIN) 81 MG EC tablet, Take 81 mg by mouth once daily., Disp: , Rfl:     atenoloL (TENORMIN) 50 MG tablet, Take 1 tablet (50 mg total) by mouth once daily., Disp: 90 tablet, Rfl: 3    chlorthalidone (HYGROTEN) 25 MG Tab, One-half tablet po daily, Disp: 30 tablet, Rfl: 3    clopidogrel (PLAVIX) 75 mg tablet, Take 1 tablet (75 mg total) by mouth once daily., Disp: 90 tablet, Rfl: 3    doxycycline (VIBRA-TABS) 100 MG tablet, Take 1 tablet (100 mg total) by mouth 2 (two) times daily., Disp: 20 tablet, Rfl: 0    erythromycin (ROMYCIN) ophthalmic  ointment, PLEASE SEE ATTACHED FOR DETAILED DIRECTIONS, Disp: , Rfl:     finasteride (PROSCAR) 5 mg tablet, Take 1 tablet (5 mg total) by mouth once daily., Disp: 90 tablet, Rfl: 3    FLUAD QUAD 2020-21,65Y UP,,PF, 60 mcg (15 mcg x 4)/0.5 mL Syrg, , Disp: , Rfl:     HYDROcodone-acetaminophen (NORCO) 5-325 mg per tablet, Take 1 tablet by mouth every 6 (six) hours as needed. FOR PAIN, Disp: , Rfl:     isosorbide mononitrate (IMDUR) 30 MG 24 hr tablet, Take 1 tablet (30 mg total) by mouth once daily., Disp: 30 tablet, Rfl: 11    meloxicam (MOBIC) 15 MG tablet, Take 1 tablet (15 mg total) by mouth once daily., Disp: 10 tablet, Rfl: 0    Past Medical History:   Diagnosis Date    Ankylosing spondylitis     thoracic;     BPH (benign prostatic hypertrophy)     Cholelithiases     DDD (degenerative disc disease), lumbar     treated with FILOMENA with success    Fatty liver     HTN (hypertension)     Hypertriglyceridemia     Left kidney mass     followed by urology, Dr. De Guzman    Tinnitus of both ears     he consulted with ENT; He uses Xanax PRn    Wears glasses        Past Surgical History:   Procedure Laterality Date    CORONARY ANGIOGRAPHY  1/6/2020    Procedure: ANGIOGRAM, CORONARY ARTERY;  Surgeon: Uday Tripp MD;  Location: [facility];  Service: Cardiology;;    HERNIA REPAIR      KNEE SURGERY      right; 3 separate surgeries to repair patellar fracture    stapendectomy Bilateral 1985    bilateraly    WISDOM TOOTH EXTRACTION         Family History   Problem Relation Age of Onset    Aneurysm Father     Cancer Mother         stomach    Diabetes Mother     Cancer Brother         salivary       Social History     Socioeconomic History    Marital status:      Spouse name: Not on file    Number of children: 3    Years of education: Not on file    Highest education level: Not on file   Occupational History    Occupation: retired refinery   Social Needs    Financial resource strain: Not on file  "   Food insecurity     Worry: Not on file     Inability: Not on file    Transportation needs     Medical: Not on file     Non-medical: Not on file   Tobacco Use    Smoking status: Former Smoker     Types: Cigars, Cigarettes     Start date: 4/24/2014    Smokeless tobacco: Never Used   Substance and Sexual Activity    Alcohol use: Yes     Comment: occasional    Drug use: No    Sexual activity: Not on file   Lifestyle    Physical activity     Days per week: Not on file     Minutes per session: Not on file    Stress: Not on file   Relationships    Social connections     Talks on phone: Not on file     Gets together: Not on file     Attends Evangelical service: Not on file     Active member of club or organization: Not on file     Attends meetings of clubs or organizations: Not on file     Relationship status: Not on file   Other Topics Concern    Not on file   Social History Narrative    Hobbies: fish, hunt        Exercise: [employer] 3 times a week with treadmill           Review of patient's allergies indicates:   Allergen Reactions    Sulfa (sulfonamide antibiotics) Hives and Other (See Comments)     Decreased white blood count per pt       Review of Systems:  General ROS: negative for - fever  Psychological ROS: negative for - hostility  Hematological and Lymphatic ROS: positive for - bruising  Endocrine ROS: negative for - unexpected weight changes  Respiratory ROS: no cough, shortness of breath, or wheezing  Cardiovascular ROS: no chest pain or dyspnea on exertion  Gastrointestinal ROS: no abdominal pain, change in bowel habits, or black or bloody stools  Musculoskeletal ROS: negative for - muscular weakness  Neurological ROS: negative for - numbness/tingling  Dermatological ROS: negative for rash    Physical Exam:  Vitals:    09/10/20 1028   BP: (!) 177/83   Pulse: 66   Weight: 88.3 kg (194 lb 12.4 oz)   Height: 5' 9" (1.753 m)   PainSc:   2     Body mass index is 28.76 kg/m².     Gen: NAD  Gait: gait " intact  Psych:  Mood appropriate for given condition  HEENT: eyes anicteric   GI: Abd soft  CV: RRR  Lungs: breathing unlabored   ROM: limited AROM of the L spine in all planes, full ROM at ankles, knees and hips  Lumbar flexion 90 degrees, extension 50 degrees, side bending 30 degrees.    Sensation: intact to light touch in all dermatomes tested from L2-S1 bilaterally  Reflexes: 2+ b/l patella and achilles  Palpation: Tender over the coccyx  Tone: normal in the b/l knees and hips   Skin: intact  Extremities: No edema in b/l ankles or hands       Right Left   L2/3 Iliacus Hip flexion  5  5   L3/4 Qudratus Femoris Knee Extension  5  5   L4/5 Tib Anterior Ankle Dorsiflexion   5  5   L5/S1 Extensor Hallicus Longus Great toe extension  5  5                 S1/S2 Gastroc/Soleus Plantar Flexion  5  5     Imaging:  Xray lumbar spine 9/2/20  FINDINGS:  Partial osseous fusion of the left SI joint may be present as can be seen with sacroiliitis, degenerative change, or history of trauma.  Sclerosis is noted involving the right SI joint as can be seen with degenerative change or erosive change.  No acute fractures or dislocations are convincingly noted.  Superior acetabular spurring appears to be present bilaterally.  Hip joint space loss is noted bilaterally.  The coccyx appears slightly anterior angulated which is favored to be physiologic but could relate to history of fracture.  An anterolisthesis of 15 mm appears to be present of the L5 on S1 vertebral body likely associated with pars defects.  If necessary dedicated imaging could be performed.  Facet arthropathy is noted involving the lower lumbar levels    Labs:  BMP  Lab Results   Component Value Date     (H) 08/21/2020    K 3.9 08/21/2020     08/21/2020    CO2 28 08/21/2020    BUN 21 08/21/2020    CREATININE 1.2 08/21/2020    CALCIUM 9.8 08/21/2020    ANIONGAP 10 08/21/2020    ESTGFRAFRICA >60.0 08/21/2020    EGFRNONAA 59.6 (A) 08/21/2020     Lab Results    Component Value Date    ALT 46 (H) 05/23/2020    AST 34 05/23/2020    ALKPHOS 61 05/23/2020    BILITOT 1.2 (H) 05/23/2020       Assessment:   Problem List Items Addressed This Visit     None      Visit Diagnoses     Coccydynia    -  Primary    Sacral pain        Screening examination for infectious disease        Relevant Orders    COVID-19 Routine Screening        74 y.o. year old male with PMH CAD s/p stent placement on 1/6/20, HTN presents to the office with coccyx pain.  He said the pain started about a year ago when he fell and landing on his tailbone and then it worsened again about 6 months ago when he fell a second time landing on his tailbone.  Today he has pain over his coccyx, can get to 10/10, aching.  His pain is worse with sitting and relieved with standing.  He has been sitting on a donut and it hasn't helped too much.  On exam he has full strength of his lower extremities.  He has tenderness over his coccyx.  Sacral xray shows coccyx appears slightly anterior angulated which is favored to be physiologic but could relate to history of fracture.  His pain is limiting his mobility and interfering with his ADL's.  We will schedule for ganglion impar block. Follow up 2 weeks post procedure.  We can consider diagnostic cervical medial branch blocks in the future for his axial neck pain.     Treatment Plan:   Procedures: ganglion impar block. Procedure explained using an anatomical model.  Risks, benefits, alternatives explained to patient who verbalized understanding, including increased risk of infection, bleeding, need for additional procedures or surgery, and nerve damage.  Questions regarding the procedure, risks, expected outcome, and possible side effects were solicited and answered to the patient's satisfaction.  Evan wishes to proceed with the injection.  Verbal and written consent were obtained in clinic today.  PT/OT/HEP: none at this time  Medications: he had a coronary stent placed on 1/6/20.   We will get clearance for him to hold his ASA and plavix for 5 days prior to injection  Labs: Reviewed and medications are appropriately dosed for current hepatorenal function.  Imaging: No additional recommended at this time.    : Not applicable    Garth Newman M.D.  Interventional Pain Medicine / Anesthesiology